# Patient Record
Sex: MALE | Race: WHITE | Employment: OTHER | URBAN - METROPOLITAN AREA
[De-identification: names, ages, dates, MRNs, and addresses within clinical notes are randomized per-mention and may not be internally consistent; named-entity substitution may affect disease eponyms.]

---

## 2018-12-09 ENCOUNTER — APPOINTMENT (OUTPATIENT)
Dept: GENERAL RADIOLOGY | Age: 83
DRG: 872 | End: 2018-12-09
Payer: COMMERCIAL

## 2018-12-09 ENCOUNTER — HOSPITAL ENCOUNTER (INPATIENT)
Age: 83
LOS: 2 days | Discharge: HOME HEALTH | DRG: 872 | End: 2018-12-11
Attending: EMERGENCY MEDICINE | Admitting: INTERNAL MEDICINE
Payer: COMMERCIAL

## 2018-12-09 DIAGNOSIS — N39.0 URINARY TRACT INFECTION WITHOUT HEMATURIA, SITE UNSPECIFIED: ICD-10-CM

## 2018-12-09 DIAGNOSIS — K92.2 GASTROINTESTINAL HEMORRHAGE, UNSPECIFIED GASTROINTESTINAL HEMORRHAGE TYPE: Primary | ICD-10-CM

## 2018-12-09 PROBLEM — Z79.01 ANTICOAGULATED ON COUMADIN: Status: ACTIVE | Noted: 2018-12-09

## 2018-12-09 PROBLEM — I48.20 ATRIAL FIBRILLATION, CHRONIC (HCC): Status: ACTIVE | Noted: 2018-12-09

## 2018-12-09 PROBLEM — I10 ESSENTIAL HYPERTENSION: Status: ACTIVE | Noted: 2018-12-09

## 2018-12-09 LAB
-: ABNORMAL
ABSOLUTE EOS #: 0 K/UL (ref 0–0.4)
ABSOLUTE IMMATURE GRANULOCYTE: ABNORMAL K/UL (ref 0–0.3)
ABSOLUTE LYMPH #: 0.2 K/UL (ref 1–4.8)
ABSOLUTE MONO #: 0.26 K/UL (ref 0.2–0.8)
AMORPHOUS: ABNORMAL
ANION GAP SERPL CALCULATED.3IONS-SCNC: 14 MMOL/L (ref 9–17)
BACTERIA: ABNORMAL
BASOPHILS # BLD: 0 %
BASOPHILS ABSOLUTE: 0 K/UL (ref 0–0.2)
BILIRUBIN URINE: NEGATIVE
BUN BLDV-MCNC: 39 MG/DL (ref 8–23)
BUN/CREAT BLD: 19 (ref 9–20)
CALCIUM SERPL-MCNC: 8.5 MG/DL (ref 8.6–10.4)
CASTS UA: ABNORMAL /LPF
CHLORIDE BLD-SCNC: 106 MMOL/L (ref 98–107)
CO2: 22 MMOL/L (ref 20–31)
COLOR: YELLOW
COMMENT UA: ABNORMAL
CREAT SERPL-MCNC: 2.01 MG/DL (ref 0.7–1.2)
CRYSTALS, UA: ABNORMAL /HPF
DATE, STOOL #1: ABNORMAL
DATE, STOOL #2: ABNORMAL
DATE, STOOL #3: ABNORMAL
DIFFERENTIAL TYPE: ABNORMAL
DIRECT EXAM: NORMAL
EKG ATRIAL RATE: 66 BPM
EKG Q-T INTERVAL: 364 MS
EKG QRS DURATION: 100 MS
EKG QTC CALCULATION (BAZETT): 445 MS
EKG R AXIS: -5 DEGREES
EKG T AXIS: 148 DEGREES
EKG VENTRICULAR RATE: 90 BPM
EOSINOPHILS RELATIVE PERCENT: 0 % (ref 1–4)
EPITHELIAL CELLS UA: ABNORMAL /HPF (ref 0–5)
GFR AFRICAN AMERICAN: 38 ML/MIN
GFR NON-AFRICAN AMERICAN: 32 ML/MIN
GFR SERPL CREATININE-BSD FRML MDRD: ABNORMAL ML/MIN/{1.73_M2}
GFR SERPL CREATININE-BSD FRML MDRD: ABNORMAL ML/MIN/{1.73_M2}
GLUCOSE BLD-MCNC: 169 MG/DL (ref 70–99)
GLUCOSE URINE: NEGATIVE
HCT VFR BLD CALC: 26.5 % (ref 41–53)
HEMOCCULT SP1 STL QL: POSITIVE
HEMOCCULT SP2 STL QL: ABNORMAL
HEMOCCULT SP3 STL QL: ABNORMAL
HEMOGLOBIN: 9.1 G/DL (ref 13.5–17.5)
IMMATURE GRANULOCYTES: ABNORMAL %
KETONES, URINE: NEGATIVE
LACTIC ACID: 1.9 MMOL/L (ref 0.5–2.2)
LEUKOCYTE ESTERASE, URINE: ABNORMAL
LYMPHOCYTES # BLD: 3 % (ref 24–44)
Lab: NORMAL
MCH RBC QN AUTO: 31.6 PG (ref 26–34)
MCHC RBC AUTO-ENTMCNC: 34.5 G/DL (ref 31–37)
MCV RBC AUTO: 91.6 FL (ref 80–100)
MONOCYTES # BLD: 4 % (ref 1–7)
MUCUS: ABNORMAL
MYOGLOBIN: 1377 NG/ML (ref 28–72)
MYOGLOBIN: 1545 NG/ML (ref 28–72)
NITRITE, URINE: NEGATIVE
NRBC AUTOMATED: ABNORMAL PER 100 WBC
OTHER OBSERVATIONS UA: ABNORMAL
PDW BLD-RTO: 14.7 % (ref 11.5–14.5)
PH UA: 5.5 (ref 5–8)
PLATELET # BLD: 104 K/UL (ref 130–400)
PLATELET ESTIMATE: ABNORMAL
PMV BLD AUTO: 9.4 FL (ref 6–12)
POTASSIUM SERPL-SCNC: 3.1 MMOL/L (ref 3.7–5.3)
PROCALCITONIN: 4.03 NG/ML
PROTEIN UA: ABNORMAL
RBC # BLD: 2.9 M/UL (ref 4.5–5.9)
RBC # BLD: ABNORMAL 10*6/UL
RBC UA: ABNORMAL /HPF (ref 0–2)
RENAL EPITHELIAL, UA: ABNORMAL /HPF
SEG NEUTROPHILS: 93 % (ref 36–66)
SEGMENTED NEUTROPHILS ABSOLUTE COUNT: 6.14 K/UL (ref 1.8–7.7)
SODIUM BLD-SCNC: 142 MMOL/L (ref 135–144)
SPECIFIC GRAVITY UA: 1.01 (ref 1–1.03)
SPECIMEN DESCRIPTION: NORMAL
STATUS: NORMAL
TIME, STOOL #1: 1820
TIME, STOOL #2: ABNORMAL
TIME, STOOL #3: ABNORMAL
TOTAL CK: 238 U/L (ref 39–308)
TRICHOMONAS: ABNORMAL
TROPONIN INTERP: ABNORMAL
TROPONIN INTERP: ABNORMAL
TROPONIN T: 0.05 NG/ML
TROPONIN T: 0.06 NG/ML
TURBIDITY: ABNORMAL
URINE HGB: ABNORMAL
UROBILINOGEN, URINE: NORMAL
WBC # BLD: 6.6 K/UL (ref 3.5–11)
WBC # BLD: ABNORMAL 10*3/UL
WBC UA: ABNORMAL /HPF (ref 0–5)
YEAST: ABNORMAL

## 2018-12-09 PROCEDURE — 6370000000 HC RX 637 (ALT 250 FOR IP): Performed by: NURSE PRACTITIONER

## 2018-12-09 PROCEDURE — 2580000003 HC RX 258: Performed by: NURSE PRACTITIONER

## 2018-12-09 PROCEDURE — 6360000002 HC RX W HCPCS: Performed by: NURSE PRACTITIONER

## 2018-12-09 PROCEDURE — 2580000003 HC RX 258: Performed by: INTERNAL MEDICINE

## 2018-12-09 PROCEDURE — G0328 FECAL BLOOD SCRN IMMUNOASSAY: HCPCS

## 2018-12-09 PROCEDURE — 2700000000 HC OXYGEN THERAPY PER DAY

## 2018-12-09 PROCEDURE — 36415 COLL VENOUS BLD VENIPUNCTURE: CPT

## 2018-12-09 PROCEDURE — 84484 ASSAY OF TROPONIN QUANT: CPT

## 2018-12-09 PROCEDURE — 87086 URINE CULTURE/COLONY COUNT: CPT

## 2018-12-09 PROCEDURE — 87186 SC STD MICRODIL/AGAR DIL: CPT

## 2018-12-09 PROCEDURE — 99285 EMERGENCY DEPT VISIT HI MDM: CPT

## 2018-12-09 PROCEDURE — 6360000002 HC RX W HCPCS: Performed by: INTERNAL MEDICINE

## 2018-12-09 PROCEDURE — 96365 THER/PROPH/DIAG IV INF INIT: CPT

## 2018-12-09 PROCEDURE — 84145 PROCALCITONIN (PCT): CPT

## 2018-12-09 PROCEDURE — 87040 BLOOD CULTURE FOR BACTERIA: CPT

## 2018-12-09 PROCEDURE — C9113 INJ PANTOPRAZOLE SODIUM, VIA: HCPCS | Performed by: INTERNAL MEDICINE

## 2018-12-09 PROCEDURE — 93005 ELECTROCARDIOGRAM TRACING: CPT

## 2018-12-09 PROCEDURE — 87149 DNA/RNA DIRECT PROBE: CPT

## 2018-12-09 PROCEDURE — 2060000000 HC ICU INTERMEDIATE R&B

## 2018-12-09 PROCEDURE — 81001 URINALYSIS AUTO W/SCOPE: CPT

## 2018-12-09 PROCEDURE — 71045 X-RAY EXAM CHEST 1 VIEW: CPT

## 2018-12-09 PROCEDURE — 83874 ASSAY OF MYOGLOBIN: CPT

## 2018-12-09 PROCEDURE — 80048 BASIC METABOLIC PNL TOTAL CA: CPT

## 2018-12-09 PROCEDURE — 99222 1ST HOSP IP/OBS MODERATE 55: CPT | Performed by: INTERNAL MEDICINE

## 2018-12-09 PROCEDURE — 85025 COMPLETE CBC W/AUTO DIFF WBC: CPT

## 2018-12-09 PROCEDURE — 87205 SMEAR GRAM STAIN: CPT

## 2018-12-09 PROCEDURE — 6370000000 HC RX 637 (ALT 250 FOR IP): Performed by: INTERNAL MEDICINE

## 2018-12-09 PROCEDURE — 83605 ASSAY OF LACTIC ACID: CPT

## 2018-12-09 PROCEDURE — 82550 ASSAY OF CK (CPK): CPT

## 2018-12-09 PROCEDURE — 87804 INFLUENZA ASSAY W/OPTIC: CPT

## 2018-12-09 PROCEDURE — 87088 URINE BACTERIA CULTURE: CPT

## 2018-12-09 RX ORDER — HYDROCODONE BITARTRATE AND ACETAMINOPHEN 5; 325 MG/1; MG/1
2 TABLET ORAL EVERY 4 HOURS PRN
Status: DISCONTINUED | OUTPATIENT
Start: 2018-12-09 | End: 2018-12-11 | Stop reason: HOSPADM

## 2018-12-09 RX ORDER — POTASSIUM CHLORIDE 20 MEQ/1
40 TABLET, EXTENDED RELEASE ORAL ONCE
Status: COMPLETED | OUTPATIENT
Start: 2018-12-09 | End: 2018-12-09

## 2018-12-09 RX ORDER — SODIUM CHLORIDE 9 MG/ML
INJECTION, SOLUTION INTRAVENOUS CONTINUOUS
Status: DISCONTINUED | OUTPATIENT
Start: 2018-12-09 | End: 2018-12-10

## 2018-12-09 RX ORDER — MORPHINE SULFATE 2 MG/ML
2 INJECTION, SOLUTION INTRAMUSCULAR; INTRAVENOUS
Status: DISCONTINUED | OUTPATIENT
Start: 2018-12-09 | End: 2018-12-11 | Stop reason: HOSPADM

## 2018-12-09 RX ORDER — MORPHINE SULFATE 4 MG/ML
4 INJECTION, SOLUTION INTRAMUSCULAR; INTRAVENOUS
Status: DISCONTINUED | OUTPATIENT
Start: 2018-12-09 | End: 2018-12-11 | Stop reason: HOSPADM

## 2018-12-09 RX ORDER — SODIUM CHLORIDE 0.9 % (FLUSH) 0.9 %
10 SYRINGE (ML) INJECTION EVERY 12 HOURS SCHEDULED
Status: DISCONTINUED | OUTPATIENT
Start: 2018-12-09 | End: 2018-12-11 | Stop reason: HOSPADM

## 2018-12-09 RX ORDER — PRAVASTATIN SODIUM 40 MG
40 TABLET ORAL DAILY
COMMUNITY

## 2018-12-09 RX ORDER — ONDANSETRON 2 MG/ML
4 INJECTION INTRAMUSCULAR; INTRAVENOUS EVERY 6 HOURS PRN
Status: DISCONTINUED | OUTPATIENT
Start: 2018-12-09 | End: 2018-12-11 | Stop reason: HOSPADM

## 2018-12-09 RX ORDER — POTASSIUM CHLORIDE 20 MEQ/1
40 TABLET, EXTENDED RELEASE ORAL PRN
Status: DISCONTINUED | OUTPATIENT
Start: 2018-12-09 | End: 2018-12-09

## 2018-12-09 RX ORDER — 0.9 % SODIUM CHLORIDE 0.9 %
1000 INTRAVENOUS SOLUTION INTRAVENOUS ONCE
Status: COMPLETED | OUTPATIENT
Start: 2018-12-09 | End: 2018-12-09

## 2018-12-09 RX ORDER — POTASSIUM CHLORIDE 7.45 MG/ML
10 INJECTION INTRAVENOUS PRN
Status: DISCONTINUED | OUTPATIENT
Start: 2018-12-09 | End: 2018-12-09

## 2018-12-09 RX ORDER — FUROSEMIDE 40 MG/1
40 TABLET ORAL DAILY
COMMUNITY

## 2018-12-09 RX ORDER — HYDROCODONE BITARTRATE AND ACETAMINOPHEN 5; 325 MG/1; MG/1
1 TABLET ORAL EVERY 4 HOURS PRN
Status: DISCONTINUED | OUTPATIENT
Start: 2018-12-09 | End: 2018-12-11 | Stop reason: HOSPADM

## 2018-12-09 RX ORDER — ACETAMINOPHEN 500 MG
1000 TABLET ORAL ONCE
Status: COMPLETED | OUTPATIENT
Start: 2018-12-09 | End: 2018-12-09

## 2018-12-09 RX ORDER — ACETAMINOPHEN 325 MG/1
650 TABLET ORAL EVERY 4 HOURS PRN
Status: DISCONTINUED | OUTPATIENT
Start: 2018-12-09 | End: 2018-12-11 | Stop reason: HOSPADM

## 2018-12-09 RX ORDER — ONDANSETRON 2 MG/ML
4 INJECTION INTRAMUSCULAR; INTRAVENOUS EVERY 6 HOURS PRN
Status: DISCONTINUED | OUTPATIENT
Start: 2018-12-09 | End: 2018-12-09 | Stop reason: SDUPTHER

## 2018-12-09 RX ORDER — CILOSTAZOL 100 MG/1
100 TABLET ORAL DAILY
COMMUNITY

## 2018-12-09 RX ORDER — CARVEDILOL 6.25 MG/1
6.25 TABLET ORAL 2 TIMES DAILY
Status: DISCONTINUED | OUTPATIENT
Start: 2018-12-09 | End: 2018-12-11 | Stop reason: HOSPADM

## 2018-12-09 RX ORDER — SODIUM CHLORIDE 0.9 % (FLUSH) 0.9 %
10 SYRINGE (ML) INJECTION PRN
Status: DISCONTINUED | OUTPATIENT
Start: 2018-12-09 | End: 2018-12-11 | Stop reason: HOSPADM

## 2018-12-09 RX ORDER — ONDANSETRON 4 MG/1
4 TABLET, ORALLY DISINTEGRATING ORAL EVERY 6 HOURS PRN
Status: DISCONTINUED | OUTPATIENT
Start: 2018-12-09 | End: 2018-12-11 | Stop reason: HOSPADM

## 2018-12-09 RX ORDER — POTASSIUM CHLORIDE 20MEQ/15ML
40 LIQUID (ML) ORAL PRN
Status: DISCONTINUED | OUTPATIENT
Start: 2018-12-09 | End: 2018-12-09

## 2018-12-09 RX ADMIN — SODIUM CHLORIDE: 9 INJECTION, SOLUTION INTRAVENOUS at 22:30

## 2018-12-09 RX ADMIN — POTASSIUM CHLORIDE 40 MEQ: 20 TABLET, EXTENDED RELEASE ORAL at 22:30

## 2018-12-09 RX ADMIN — SODIUM CHLORIDE 8 MG/HR: 9 INJECTION, SOLUTION INTRAVENOUS at 22:30

## 2018-12-09 RX ADMIN — SODIUM CHLORIDE 1000 ML: 9 INJECTION, SOLUTION INTRAVENOUS at 15:58

## 2018-12-09 RX ADMIN — CEFTRIAXONE SODIUM 1 G: 1 INJECTION, POWDER, FOR SOLUTION INTRAMUSCULAR; INTRAVENOUS at 18:28

## 2018-12-09 RX ADMIN — Medication 10 ML: at 22:30

## 2018-12-09 RX ADMIN — CARVEDILOL 6.25 MG: 6.25 TABLET, FILM COATED ORAL at 22:30

## 2018-12-09 RX ADMIN — ACETAMINOPHEN 1000 MG: 500 TABLET ORAL at 16:41

## 2018-12-09 ASSESSMENT — ENCOUNTER SYMPTOMS
SORE THROAT: 0
SHORTNESS OF BREATH: 0
CONSTIPATION: 0
COUGH: 0
COLOR CHANGE: 0
WHEEZING: 0
NAUSEA: 0
SINUS PRESSURE: 0
VOMITING: 0
DIARRHEA: 0
ABDOMINAL PAIN: 0
RHINORRHEA: 0

## 2018-12-09 ASSESSMENT — PAIN SCALES - GENERAL
PAINLEVEL_OUTOF10: 10
PAINLEVEL_OUTOF10: 5

## 2018-12-09 NOTE — ED NOTES
Into do rectal exam with [facility] MICKY.         Anupam Rosa, RN  12/09/18 1482

## 2018-12-10 PROBLEM — B96.20 E COLI BACTEREMIA: Status: ACTIVE | Noted: 2018-12-10

## 2018-12-10 PROBLEM — R78.81 E COLI BACTEREMIA: Status: ACTIVE | Noted: 2018-12-10

## 2018-12-10 LAB
ANION GAP SERPL CALCULATED.3IONS-SCNC: 11 MMOL/L (ref 9–17)
BUN BLDV-MCNC: 42 MG/DL (ref 8–23)
BUN/CREAT BLD: 18 (ref 9–20)
CALCIUM SERPL-MCNC: 8 MG/DL (ref 8.6–10.4)
CHLORIDE BLD-SCNC: 107 MMOL/L (ref 98–107)
CO2: 23 MMOL/L (ref 20–31)
CREAT SERPL-MCNC: 2.33 MG/DL (ref 0.7–1.2)
FERRITIN: 482 UG/L (ref 30–400)
FOLATE: 18.5 NG/ML
GFR AFRICAN AMERICAN: 32 ML/MIN
GFR NON-AFRICAN AMERICAN: 27 ML/MIN
GFR SERPL CREATININE-BSD FRML MDRD: ABNORMAL ML/MIN/{1.73_M2}
GFR SERPL CREATININE-BSD FRML MDRD: ABNORMAL ML/MIN/{1.73_M2}
GLUCOSE BLD-MCNC: 128 MG/DL (ref 75–110)
GLUCOSE BLD-MCNC: 130 MG/DL (ref 75–110)
GLUCOSE BLD-MCNC: 190 MG/DL (ref 70–99)
GLUCOSE BLD-MCNC: 256 MG/DL (ref 75–110)
HCT VFR BLD CALC: 27.7 % (ref 41–53)
HEMOGLOBIN: 10.4 G/DL (ref 13.5–17.5)
HEMOGLOBIN: 9.5 G/DL (ref 13.5–17.5)
HEMOGLOBIN: 9.6 G/DL (ref 13.5–17.5)
HEMOGLOBIN: 9.8 G/DL (ref 13.5–17.5)
INR BLD: 5.6
IRON SATURATION: 8 % (ref 20–55)
IRON: 18 UG/DL (ref 59–158)
MCH RBC QN AUTO: 32.1 PG (ref 26–34)
MCHC RBC AUTO-ENTMCNC: 34.6 G/DL (ref 31–37)
MCV RBC AUTO: 92.6 FL (ref 80–100)
MYOGLOBIN: 1028 NG/ML (ref 28–72)
MYOGLOBIN: 715 NG/ML (ref 28–72)
NRBC AUTOMATED: ABNORMAL PER 100 WBC
PDW BLD-RTO: 14.5 % (ref 11.5–14.5)
PLATELET # BLD: 55 K/UL (ref 130–400)
PMV BLD AUTO: 8.6 FL (ref 6–12)
POTASSIUM SERPL-SCNC: 3.9 MMOL/L (ref 3.7–5.3)
PROTHROMBIN TIME: 53.1 SEC (ref 9.7–11.6)
RBC # BLD: 2.99 M/UL (ref 4.5–5.9)
SODIUM BLD-SCNC: 141 MMOL/L (ref 135–144)
TOTAL IRON BINDING CAPACITY: 215 UG/DL (ref 250–450)
TROPONIN INTERP: ABNORMAL
TROPONIN INTERP: ABNORMAL
TROPONIN T: 0.03 NG/ML
TROPONIN T: <0.03 NG/ML
UNSATURATED IRON BINDING CAPACITY: 197 UG/DL (ref 112–347)
WBC # BLD: 6.4 K/UL (ref 3.5–11)

## 2018-12-10 PROCEDURE — 99232 SBSQ HOSP IP/OBS MODERATE 35: CPT | Performed by: INTERNAL MEDICINE

## 2018-12-10 PROCEDURE — 6370000000 HC RX 637 (ALT 250 FOR IP): Performed by: NURSE PRACTITIONER

## 2018-12-10 PROCEDURE — 83540 ASSAY OF IRON: CPT

## 2018-12-10 PROCEDURE — 2580000003 HC RX 258: Performed by: NURSE PRACTITIONER

## 2018-12-10 PROCEDURE — 84425 ASSAY OF VITAMIN B-1: CPT

## 2018-12-10 PROCEDURE — 99222 1ST HOSP IP/OBS MODERATE 55: CPT | Performed by: INTERNAL MEDICINE

## 2018-12-10 PROCEDURE — 6360000002 HC RX W HCPCS: Performed by: INTERNAL MEDICINE

## 2018-12-10 PROCEDURE — 2060000000 HC ICU INTERMEDIATE R&B

## 2018-12-10 PROCEDURE — 85027 COMPLETE CBC AUTOMATED: CPT

## 2018-12-10 PROCEDURE — 84484 ASSAY OF TROPONIN QUANT: CPT

## 2018-12-10 PROCEDURE — 82947 ASSAY GLUCOSE BLOOD QUANT: CPT

## 2018-12-10 PROCEDURE — 82746 ASSAY OF FOLIC ACID SERUM: CPT

## 2018-12-10 PROCEDURE — 97162 PT EVAL MOD COMPLEX 30 MIN: CPT

## 2018-12-10 PROCEDURE — 80048 BASIC METABOLIC PNL TOTAL CA: CPT

## 2018-12-10 PROCEDURE — 82728 ASSAY OF FERRITIN: CPT

## 2018-12-10 PROCEDURE — 83550 IRON BINDING TEST: CPT

## 2018-12-10 PROCEDURE — 36415 COLL VENOUS BLD VENIPUNCTURE: CPT

## 2018-12-10 PROCEDURE — 83874 ASSAY OF MYOGLOBIN: CPT

## 2018-12-10 PROCEDURE — 85610 PROTHROMBIN TIME: CPT

## 2018-12-10 PROCEDURE — 2580000003 HC RX 258: Performed by: INTERNAL MEDICINE

## 2018-12-10 PROCEDURE — G8979 MOBILITY GOAL STATUS: HCPCS

## 2018-12-10 PROCEDURE — 6370000000 HC RX 637 (ALT 250 FOR IP): Performed by: INTERNAL MEDICINE

## 2018-12-10 PROCEDURE — C9113 INJ PANTOPRAZOLE SODIUM, VIA: HCPCS | Performed by: INTERNAL MEDICINE

## 2018-12-10 PROCEDURE — 97116 GAIT TRAINING THERAPY: CPT

## 2018-12-10 PROCEDURE — G8978 MOBILITY CURRENT STATUS: HCPCS

## 2018-12-10 PROCEDURE — 97530 THERAPEUTIC ACTIVITIES: CPT

## 2018-12-10 PROCEDURE — 85018 HEMOGLOBIN: CPT

## 2018-12-10 RX ORDER — NICOTINE POLACRILEX 4 MG
15 LOZENGE BUCCAL PRN
Status: DISCONTINUED | OUTPATIENT
Start: 2018-12-10 | End: 2018-12-11 | Stop reason: HOSPADM

## 2018-12-10 RX ORDER — PHYTONADIONE 5 MG/1
5 TABLET ORAL ONCE
Status: COMPLETED | OUTPATIENT
Start: 2018-12-10 | End: 2018-12-10

## 2018-12-10 RX ORDER — PANTOPRAZOLE SODIUM 40 MG/1
40 TABLET, DELAYED RELEASE ORAL
Status: DISCONTINUED | OUTPATIENT
Start: 2018-12-10 | End: 2018-12-11 | Stop reason: HOSPADM

## 2018-12-10 RX ORDER — DEXTROSE MONOHYDRATE 50 MG/ML
100 INJECTION, SOLUTION INTRAVENOUS PRN
Status: DISCONTINUED | OUTPATIENT
Start: 2018-12-10 | End: 2018-12-11 | Stop reason: HOSPADM

## 2018-12-10 RX ORDER — DEXTROSE MONOHYDRATE 25 G/50ML
12.5 INJECTION, SOLUTION INTRAVENOUS PRN
Status: DISCONTINUED | OUTPATIENT
Start: 2018-12-10 | End: 2018-12-11 | Stop reason: HOSPADM

## 2018-12-10 RX ADMIN — SODIUM CHLORIDE 8 MG/HR: 9 INJECTION, SOLUTION INTRAVENOUS at 07:44

## 2018-12-10 RX ADMIN — CARVEDILOL 6.25 MG: 6.25 TABLET, FILM COATED ORAL at 09:58

## 2018-12-10 RX ADMIN — INSULIN LISPRO 3 UNITS: 100 INJECTION, SOLUTION INTRAVENOUS; SUBCUTANEOUS at 22:07

## 2018-12-10 RX ADMIN — SODIUM CHLORIDE: 9 INJECTION, SOLUTION INTRAVENOUS at 07:44

## 2018-12-10 RX ADMIN — PHYTONADIONE 5 MG: 5 TABLET ORAL at 04:29

## 2018-12-10 RX ADMIN — CARVEDILOL 6.25 MG: 6.25 TABLET, FILM COATED ORAL at 20:41

## 2018-12-10 RX ADMIN — Medication 10 ML: at 20:41

## 2018-12-10 RX ADMIN — CEFTRIAXONE SODIUM 1 G: 1 INJECTION, POWDER, FOR SOLUTION INTRAMUSCULAR; INTRAVENOUS at 17:46

## 2018-12-10 RX ADMIN — PANTOPRAZOLE SODIUM 40 MG: 40 TABLET, DELAYED RELEASE ORAL at 19:14

## 2018-12-10 ASSESSMENT — PAIN SCALES - GENERAL: PAINLEVEL_OUTOF10: 0

## 2018-12-10 NOTE — ED PROVIDER NOTES
more for level 5)     ED Triage Vitals [12/09/18 1539]   BP Temp Temp Source Pulse Resp SpO2 Height Weight   (!) 113/39 98.6 °F (37 °C) Oral 84 24 (!) 89 % 5' 11\" (1.803 m) 199 lb (90.3 kg)       Physical Exam   Constitutional: He is oriented to person, place, and time. He appears well-developed and well-nourished. HENT:   Head: Normocephalic and atraumatic. Mouth/Throat: Oropharynx is clear and moist.   Eyes: Pupils are equal, round, and reactive to light. Conjunctivae are normal.   Neck: Normal range of motion. Neck supple. Cardiovascular: Normal rate and regular rhythm. Pulmonary/Chest: Effort normal and breath sounds normal. No stridor. No respiratory distress. Abdominal: Soft. Bowel sounds are normal.   Musculoskeletal: Normal range of motion. Lymphadenopathy:     He has no cervical adenopathy. Neurological: He is alert and oriented to person, place, and time. Skin: Skin is warm and dry. No rash noted. Psychiatric: He has a normal mood and affect. Vitals reviewed. DIAGNOSTIC RESULTS         RADIOLOGY:   Non-plain film images such as CT, Ultrasound and MRI are read by the radiologist. Alcario Risk radiographic images are visualized and preliminarily interpreted by the emergency physician with the below findings:    Xr Chest Portable    Result Date: 12/9/2018  EXAMINATION: SINGLE XRAY VIEW OF THE CHEST 12/9/2018 3:44 pm COMPARISON: None. HISTORY: ORDERING SYSTEM PROVIDED HISTORY: Chest Pain TECHNOLOGIST PROVIDED HISTORY: Chest Pain Ordering Physician Provided Reason for Exam: chest pain  cough Acuity: Unknown Type of Exam: Unknown FINDINGS: There is moderate cardiomegaly. No infiltrate or consolidation or effusion is identified. Vascular calcifications are present and there are degenerative changes of the spine. Cardiomegaly. Interpretation per the Radiologist below, if available at the time of this note:    XR CHEST PORTABLE   Final Result   Cardiomegaly. LABS:  Labs Reviewed   CBC WITH AUTO DIFFERENTIAL - Abnormal; Notable for the following:        Result Value    RBC 2.90 (*)     Hemoglobin 9.1 (*)     Hematocrit 26.5 (*)     RDW 14.7 (*)     Platelets 738 (*)     Seg Neutrophils 93 (*)     Lymphocytes 3 (*)     Eosinophils % 0 (*)     Absolute Lymph # 0.20 (*)     All other components within normal limits   BASIC METABOLIC PANEL - Abnormal; Notable for the following:     Glucose 169 (*)     BUN 39 (*)     CREATININE 2.01 (*)     Calcium 8.5 (*)     Potassium 3.1 (*)     GFR Non- 32 (*)     GFR  38 (*)     All other components within normal limits   TROP/MYOGLOBIN - Abnormal; Notable for the following:     Troponin T 0.06 (*)     Myoglobin 1,377 (*)     All other components within normal limits   URINE RT REFLEX TO CULTURE - Abnormal; Notable for the following:     Turbidity UA CLOUDY (*)     Urine Hgb 3+ (*)     Protein, UA TRACE (*)     Leukocyte Esterase, Urine SMALL (*)     All other components within normal limits   PROCALCITONIN - Abnormal; Notable for the following:     Procalcitonin 4.03 (*)     All other components within normal limits   MICROSCOPIC URINALYSIS - Abnormal; Notable for the following:     Bacteria, UA MANY (*)     Mucus, UA 1+ (*)     All other components within normal limits   OCCULT BLOOD SCREEN - Abnormal; Notable for the following:     Occult Blood, Stool #1 POSITIVE (*)     All other components within normal limits   RAPID INFLUENZA A/B ANTIGENS   CULTURE BLOOD #1   CULTURE BLOOD #1   URINE CULTURE CLEAN CATCH   LACTIC ACID   CK       All other labs were within normal range or not returned as of this dictation.     EMERGENCY DEPARTMENT COURSE and DIFFERENTIAL DIAGNOSIS/MDM:   Vitals:    Vitals:    12/09/18 1804 12/09/18 1815 12/09/18 1830 12/09/18 1836   BP:  (!) 100/42 (!) 101/38    Pulse:  80 86 78   Resp:  25 25 26   Temp: 102.7 °F (39.3 °C)      TempSrc: Oral      SpO2:    95%   Weight: Height:           Medical Decision Making: Cultures ×2 were drawn. His lactic acid is normal.  Found to have a urinary tract infection. He'll be admitted for IV antibiotics. CONSULTS:  IP CONSULT TO INTERNAL MEDICINE  IP CONSULT TO GI  IP CONSULT TO SOCIAL WORK        FINAL IMPRESSION      1. Gastrointestinal hemorrhage, unspecified gastrointestinal hemorrhage type    2.  Urinary tract infection without hematuria, site unspecified          DISPOSITION/PLAN   DISPOSITION Admitted 12/09/2018 06:45:36 PM      PATIENT REFERRED TO:   Toshia Dixon [phone]            DISCHARGE MEDICATIONS:     New Prescriptions    No medications on file           (Please note that portions of this note were completed with a voice recognition program.  Efforts were made to edit the dictations but occasionally words are mis-transcribed.)    [address] [facility] NP, APRN - [id number]  Certified Nurse Practitioner            DANA Thomas CNP  12/09/18 [facility], APRN - CNP  12/09/18 2020

## 2018-12-10 NOTE — CONSULTS
morphine **OR** morphine, ondansetron **OR** ondansetron    SOCIAL HISTORY:     Tobacco:   reports that he has never smoked. He has never used smokeless tobacco.  Alcohol:   reports that he drinks about 4.2 oz of alcohol per week . Illicit drugs:  reports that he does not use drugs. FAMILY HISTORY:     History reviewed. No pertinent family history. REVIEW OF SYSTEMS:    Constitutional: No fever, no chills, no lethargy, no weakness. HEENT:  No headache, otalgia, itchy eyes, nasal discharge or sore throat. Cardiac:  No chest pain, dyspnea, orthopnea or PND. Chest:   No cough, phlegm or wheezing. Abdomen:  See gi hpi  Neuro:  No focal weakness, abnormal movements or seizure like activity. Skin:   No rashes, no itching. :   No hematuria, no pyuria, no dysuria, no flank pain. Extremities:  No swelling or joint pains. ROS was otherwise negative except as mentioned in the [address] PHYSICAL EXAM:    /74   Pulse 82   Temp 97.3 °F (36.3 °C) (Oral)   Resp 16   Ht 5' 11\" (1.803 m)   Wt 199 lb (90.3 kg)   SpO2 98%   BMI 27.75 kg/m²     GENERAL:   Well developed, Well nourished, No apparent distress  HEAD:   Normocephalic, Atraumatic  EENT:   EOMI, Sclera not icteric, Oropharynx moist  NECK:   Supple, Trachea midline  LUNGS:  CTA Bilaterally  HEART:  RRR, No murmur  ABDOMEN:   Soft, Nontender, Nondistended, BS WNL  EXT:   No clubbing. No cyanosis. No edema. SKIN:   No rashes. No jaundice. No stigmata of liver disease.     MUSC/SKEL:   Adequate muscle bulk for patient's age, No significant synovitis, No deformities  NEURO:  A&O x Three, CN II- XII grossly intact    LABS AND IMAGING:    CBC  Recent Labs      12/09/18   1605  12/10/18   0027  12/10/18   0311   WBC  6.6   --   6.4   HGB  9.1*  9.5*  9.6*   HCT  26.5*   --   27.7*   MCV  91.6   --   92.6   PLT  104*   --   55*       BMP  Recent Labs      12/09/18   1605  12/10/18   0311   NA  142  141   K  3.1*  3.9   CL  106  107   CO2  22  23   BUN Adelaida Turner MD

## 2018-12-10 NOTE — H&P
[facility]    HISTORY AND PHYSICAL EXAMINATION            Date:   12/9/2018  Patient name:  Horacio Chapman  Date of admission:  12/9/2018  3:39 PM  MRN:   [id number]  Account:  [de-identified]  YOB: 1930  PCP:    Joaquín Ahumada MD  Room:   [place] [address]  Code Status:    No Order    Chief Complaint:     Chief Complaint   Patient presents with    Fatigue     X 3 days       History Obtained From:     patient, electronic medical record    History of Present Illness: The patient is a 80 y.o. male with PMH significant for a fib on coumadin, HTN, CHF, CKD who presented with fatigue. Patient states symptoms were going on for the past 3 days. State he was also having intermittent diarrhea and nausea/vomiting but states symptoms resolved. Denies any fever/chills, chest/abdominal pain. States he was feeling weak and dehydrated. In ER labs demonstrating Hgb of 9.1, FOBT positive. Creatinine elevated at 2.01 similar to previous consistent with history CKD. UA demonstrating UTI. Currently on nasal canula with no home oxygen use at home per patient.        Past Medical History:     Past Medical History:   Diagnosis Date    Achilles tendon pain 1980's    Repair, Had DVT post surgery    Anemia     Atrial fibrillation ([address]     Cardiomyopathy ([address]     Cardiomyopathy ([address]     Cataract     [facility]est[facility]     Chronic kidney disease     Stage 3    COPD (chronic obstructive pulmonary disease) ([facility])     Dental disease     Diabetes mellitus ([address]     Type 2     Diabetic neuropathy ([facility])     Bilateral Lower legs distal to knees    DVT (deep venous thrombosis) ([facility]) 1980's    With PE     Edema     Bilateral Legs    Entropion 06/2016    Right Lower Lid    Gout     Hearing loss     Hyperlipidemia     Hypertension     Ribs, multiple fractures     Per [address] records        Past Surgical History:     Past Surgical History: palpitations. GASTROINTESTINAL:  negative for nausea, vomiting, change in bowel habits, abdominal pain   INTEGUMENT:  negative for rash, skin lesions, easy bruising   HEMATOLOGIC/LYMPHATIC:  negative for swelling/edema  ALLERGIC/IMMUNOLOGIC:  negative for urticaria , itching  ENDOCRINE:  negative increase in drinking, increase in urination, hot or cold intolerance  MUSCULOSKELETAL:  negative joint pains, muscle aches, swelling of joints  NEUROLOGICAL:  negative for headaches, dizziness, lightheadedness, numbness, pain, tingling extremities  BEHAVIOR/PSYCH:  negative for depression, anxiety    Physical Exam:   BP (!) 101/38   Pulse 78   Temp 102.7 °F (39.3 °C) (Oral)   Resp 26   Ht 5' 11\" (1.803 m)   Wt 199 lb (90.3 kg)   SpO2 95%   BMI 27.75 kg/m²   Temp (24hrs), Av.7 °F (38.2 °C), Min:98.6 °F (37 °C), Max:102.7 °F (39.3 °C)    No results for input(s): POCGLU in the last 72 hours. No intake or output data in the 24 hours ending 18    General Appearance:  alert, well appearing, and in no acute distress, on NC  Mental status: oriented to person, place, and time with normal affect  Head:  normocephalic, atraumatic.   Eye: no icterus, redness, pupils equal and reactive, extraocular eye movements intact, conjunctiva clear  Ear: normal external ear, no discharge, hearing intact  Nose:  no drainage noted  Mouth: mucous membranes moist  Neck: supple, no carotid bruits, thyroid not palpable  Lungs: Bilateral equal air entry, clear to auscultation, no wheezing  Cardiovascular: normal rate, regular rhythm, no murmur  Abdomen: Soft, nontender, nondistended, normal bowel sounds  Neurologic: There are no new focal motor or sensory deficits, normal muscle tone and bulk, no abnormal sensation, normal speech, cranial nerves II through XII grossly intact  Skin: No gross lesions, rashes, bruising or bleeding on exposed skin area  Extremities:  peripheral pulses palpable, no pedal edema or calf pain with REPORTED NEG    Date, Stool #2 NOT REPORTED     Time, Stool #2 NOT REPORTED     Occult Blood, Stool #3 NOT REPORTED NEG    Date, Stool #3 NOT REPORTED     Time, Stool #3 NOT REPORTED        Imaging/Diagnostics:    Xr Chest Portable    Result Date: 12/9/2018  EXAMINATION: SINGLE XRAY VIEW OF THE CHEST 12/9/2018 3:44 pm COMPARISON: None. HISTORY: ORDERING SYSTEM PROVIDED HISTORY: Chest Pain TECHNOLOGIST PROVIDED HISTORY: Chest Pain Ordering Physician Provided Reason for Exam: chest pain  cough Acuity: Unknown Type of Exam: Unknown FINDINGS: There is moderate cardiomegaly. No infiltrate or consolidation or effusion is identified. Vascular calcifications are present and there are degenerative changes of the spine. Cardiomegaly. Assessment :      Primary Problem  GI bleed    Active Hospital Problems    Diagnosis Date Noted    GI bleed [K92.2] 12/09/2018    UTI (urinary tract infection) [N39.0] 12/09/2018    Atrial fibrillation, chronic (HCC) [I48.2] 12/09/2018    Essential hypertension [I10] 12/09/2018    Anticoagulated on Coumadin [Z51.81, Z79.01] 12/09/2018       Plan:     Patient status Admit as inpatient in the  Progressive Unit/Step down    - GI consult  - Monitor H/H  - PPI  - IV abx for UTI  - Hold coumadin for a fib  - Resume home medications  - PT/OT  - SW for dc planning    Consultations:   IP CONSULT TO INTERNAL MEDICINE  IP CONSULT TO GI  IP CONSULT TO SOCIAL WORK     Patient is admitted as inpatient status because of co-morbidities listed above, severity of signs and symptoms as outlined, requirement for current medical therapies and most importantly because of direct risk to patient if care not provided in a hospital setting.     Connie Vizcarra MD  12/9/2018  7:14 PM    Copy sent to Dr. Dedrick Yoo MD

## 2018-12-10 NOTE — PROGRESS NOTES
Restriction  Other position/activity restrictions: NPO except ice chips, telemetry, BR with BRP, O2, NC/2L/min  Vision/Hearing  Vision: Impaired  Vision Exceptions: Wears glasses at all times  Hearing: Within functional limits     Subjective  General  Chart Reviewed: Yes  Patient assessed for rehabilitation services?: Yes  Additional Pertinent Hx: cardiomyopathy, atrial fibrillation, CHF, CKD3, DVT with PE  Response To Previous Treatment: Not applicable  Follows Commands: Within Functional Limits  General Comment  Comments: RNPatricia PT  Subjective  Subjective: Pt agreeable to PT  Pain Screening  Patient Currently in Pain: No          Orientation  Orientation  Overall Orientation Status: Within Functional Limits  Social/Functional History  Social/Functional History  Lives With: Spouse  Type of Home: House (condo)  Home Layout: One level  Home Access: Stairs to enter with rails  Entrance Stairs - Number of Steps: 2  Entrance Stairs - Rails: Right  Bathroom Shower/Tub: Walk-in shower  Bathroom Toilet: Handicap height  Bathroom Equipment: Grab bars in shower, Built-in shower seat, Grab bars around toilet  Home Equipment: 4 wheeled walker, Cane  ADL Assistance: Independent  Homemaking Assistance: Needs assistance (shares cooking & cleaning lady for heavy clean)  Homemaking Responsibilities: Yes  Ambulation Assistance: Independent (using cane)  Transfer Assistance: Independent  Active : Yes  Mode of Transportation: Car  Cognition   Cognition  Overall Cognitive Status: Exceptions  Arousal/Alertness: Appropriate responses to stimuli  Attention Span: Appears intact  Memory: Appears intact  Safety Judgement: Decreased awareness of need for assistance;Decreased awareness of need for safety  Problem Solving: Able to problem solve independently;Good awareness of errors made  Insights: Fully aware of deficits  Initiation: Does not require cues  Sequencing: Requires cues for some    Objective          AROM RLE (degrees)  RLE AROM: WFL  AROM LLE (degrees)  LLE AROM : WFL  AROM RUE (degrees)  RUE AROM : WFL  AROM LUE (degrees)  LUE AROM : WFL  Strength RLE  Comment: 4/5 hip  Strength LLE  Comment: 4/5 hip  Strength RUE  Strength RUE: WFL  Strength LUE  Strength LUE: WFL  Tone RLE  RLE Tone: Normotonic  Tone LLE  LLE Tone: Normotonic  Coordination  Movements Are Fluid And Coordinated: Yes  Motor Control  Gross Motor?: WFL  Sensation  Overall Sensation Status: WFL  Bed mobility  Rolling to Left: Supervision  Supine to Sit: Contact guard assistance  Sit to Supine: Contact guard assistance  Comment: cues/assist for technique & hand placement to use UB to assist   Pt sat at EOB for 5 minutes to rest after bed mobility & prepare lines for standing & ambulation. Transfers  Sit to Stand: Minimal Assistance  Stand to sit: Contact guard assistance  Bed to Chair: Contact guard assistance  Comment: Ed for correct hand placement for safe sit/stand  Ambulation  Ambulation?: Yes  Ambulation 1  Surface: level tile  Device: Rolling Walker  Assistance: Contact guard assistance  Quality of Gait: step to pattern, cues for safety & awareness for lines  Distance: 20ft     Balance  Sitting - Static: Good  Sitting - Dynamic: Good  Standing - Static: Good;-  Standing - Dynamic: Fair;+    Pt amb to BR for toileting, Minimal Assistance to sit to toilet. Pt stood with Contact Guard Assistance, amb 2ft to sink for hand hygiene x 2 minutes then ambulated 20 ft with R/walker & sat to EOB, rested 3 minutes then completed sit to supine with [address]  Pt educated on prevention of sedentary complications     All lines intact, call light within reach, and patient positioned comfortably at end of treatment. All patient needs addressed prior to ending therapy session. Assessment   Body structures, Functions, Activity limitations: Decreased functional mobility ; Decreased strength;Decreased safe awareness;Decreased endurance;Decreased balance  Assessment: Pt requires continued skilled PT & is appropriate to D/C Home with HH PT to increase independence with functional mobility, balance, safety awareness & activity tolerance. Prognosis: Good  Decision Making: Medium Complexity  Exam: ROM, MMT, functional mobility, activity tolerance, Balance, & [employer] AM-[facility] 6 Clicks Basic Mobility   Clinical Presentation: evolving  Patient Education: PT POC, functional mobility, safety awareness, prevention of sedentary complications  REQUIRES PT FOLLOW UP: Yes  Activity Tolerance  Activity Tolerance: Patient limited by fatigue;Patient limited by endurance         Plan   Plan  Times per week: 1-2x/D,5-6D/week  Current Treatment Recommendations: Strengthening, Balance Training, Functional Mobility Training, Transfer Training, Gait Training, Home Exercise Program, Safety Education & Training, Patient/Caregiver Education & Training  Safety Devices  Type of devices: Bed alarm in place, Call light within reach, Chair alarm in place, Gait belt, Patient at risk for falls, Left in chair, Nurse notified    G-Code  PT G-Codes  Functional Assessment Tool Used: [place] AM-PAC  Score: 18  Functional Limitation: Mobility: Walking and moving around  Mobility: Walking and Moving Around Current Status (): At least 40 percent but less than 60 percent impaired, limited or restricted  Mobility: Walking and Moving Around Goal Status (): At least 1 percent but less than 20 percent impaired, limited or restricted  OutComes Score                                           AM-PAC Score  AM-PAC Inpatient Mobility Raw Score : 18  AM-PAC Inpatient T-Scale Score : 43.63  Mobility Inpatient CMS 0-100% Score: 46.58  Mobility Inpatient CMS G-Code Modifier : CK          Goals  Short term goals  Time Frame for Short term goals: 12 visits  Short term goal 1:  Inc bed-mobility & transfers to independent to enable pt to safely get in/OOB & return to [facility] & decrease risk for falls; Short term goal 2: Inc gait to amb 350ft or > indep w/ RW to enable pt to return to previous level of independence; Short term goal 3: Inc strength to [address] standing balance to good with device to facilitate pt independence for performance of ADL's & functional mobility, & reduce fall risk; Short term goal 4: Pt able to tolerate 30-40 min of activity to include 15-20 reps of ex & functional mobility including 5 minutes of standing to facilitate activity tolerance to [facility]; Short term goal 5: Pt to improve [place] AM-PAC score to 21/24 to demonstrate independence & safety with functional mobility for pt to D/C home safely;   Patient Goals   Patient goals : get my strength back       Therapy Time   Individual Concurrent Group Co-treatment   Time In  1245         Time Out  1328         Minutes  8901  [facility],

## 2018-12-11 VITALS
OXYGEN SATURATION: 95 % | DIASTOLIC BLOOD PRESSURE: 70 MMHG | TEMPERATURE: 98.6 F | BODY MASS INDEX: 27.86 KG/M2 | SYSTOLIC BLOOD PRESSURE: 130 MMHG | HEIGHT: 71 IN | RESPIRATION RATE: 20 BRPM | HEART RATE: 90 BPM | WEIGHT: 199 LBS

## 2018-12-11 PROBLEM — A41.51 E. COLI SEPSIS (HCC): Status: ACTIVE | Noted: 2018-12-10

## 2018-12-11 PROBLEM — B96.20 E. COLI UTI: Status: ACTIVE | Noted: 2018-12-09

## 2018-12-11 LAB
ANION GAP SERPL CALCULATED.3IONS-SCNC: 18 MMOL/L (ref 9–17)
BUN BLDV-MCNC: 40 MG/DL (ref 8–23)
BUN/CREAT BLD: 20 (ref 9–20)
CALCIUM SERPL-MCNC: 8.3 MG/DL (ref 8.6–10.4)
CHLORIDE BLD-SCNC: 107 MMOL/L (ref 98–107)
CO2: 20 MMOL/L (ref 20–31)
CREAT SERPL-MCNC: 1.96 MG/DL (ref 0.7–1.2)
CULTURE: ABNORMAL
GFR AFRICAN AMERICAN: 39 ML/MIN
GFR NON-AFRICAN AMERICAN: 32 ML/MIN
GFR SERPL CREATININE-BSD FRML MDRD: ABNORMAL ML/MIN/{1.73_M2}
GFR SERPL CREATININE-BSD FRML MDRD: ABNORMAL ML/MIN/{1.73_M2}
GLUCOSE BLD-MCNC: 156 MG/DL (ref 75–110)
GLUCOSE BLD-MCNC: 158 MG/DL (ref 70–99)
GLUCOSE BLD-MCNC: 160 MG/DL (ref 75–110)
GLUCOSE BLD-MCNC: 186 MG/DL (ref 75–110)
HCT VFR BLD CALC: 29.9 % (ref 41–53)
HEMOGLOBIN: 10 G/DL (ref 13.5–17.5)
HEMOGLOBIN: 10.3 G/DL (ref 13.5–17.5)
HEMOGLOBIN: 10.6 G/DL (ref 13.5–17.5)
INR BLD: 3.9
Lab: ABNORMAL
MCH RBC QN AUTO: 32.4 PG (ref 26–34)
MCHC RBC AUTO-ENTMCNC: 35.3 G/DL (ref 31–37)
MCV RBC AUTO: 91.6 FL (ref 80–100)
NRBC AUTOMATED: ABNORMAL PER 100 WBC
ORGANISM: ABNORMAL
ORGANISM: ABNORMAL
PDW BLD-RTO: 14.9 % (ref 11.5–14.5)
PLATELET # BLD: 58 K/UL (ref 130–400)
PMV BLD AUTO: 9 FL (ref 6–12)
POTASSIUM SERPL-SCNC: 3.6 MMOL/L (ref 3.7–5.3)
PROTHROMBIN TIME: 37.9 SEC (ref 9.7–11.6)
RBC # BLD: 3.27 M/UL (ref 4.5–5.9)
SODIUM BLD-SCNC: 145 MMOL/L (ref 135–144)
SPECIMEN DESCRIPTION: ABNORMAL
STATUS: ABNORMAL
WBC # BLD: 5.4 K/UL (ref 3.5–11)

## 2018-12-11 PROCEDURE — 2580000003 HC RX 258: Performed by: INTERNAL MEDICINE

## 2018-12-11 PROCEDURE — 6370000000 HC RX 637 (ALT 250 FOR IP): Performed by: NURSE PRACTITIONER

## 2018-12-11 PROCEDURE — 82947 ASSAY GLUCOSE BLOOD QUANT: CPT

## 2018-12-11 PROCEDURE — 6370000000 HC RX 637 (ALT 250 FOR IP): Performed by: INTERNAL MEDICINE

## 2018-12-11 PROCEDURE — 85027 COMPLETE CBC AUTOMATED: CPT

## 2018-12-11 PROCEDURE — 99232 SBSQ HOSP IP/OBS MODERATE 35: CPT | Performed by: INTERNAL MEDICINE

## 2018-12-11 PROCEDURE — 80048 BASIC METABOLIC PNL TOTAL CA: CPT

## 2018-12-11 PROCEDURE — 85610 PROTHROMBIN TIME: CPT

## 2018-12-11 PROCEDURE — 36415 COLL VENOUS BLD VENIPUNCTURE: CPT

## 2018-12-11 PROCEDURE — 97110 THERAPEUTIC EXERCISES: CPT

## 2018-12-11 PROCEDURE — APPSS15 APP SPLIT SHARED TIME 0-15 MINUTES: Performed by: NURSE PRACTITIONER

## 2018-12-11 PROCEDURE — 85018 HEMOGLOBIN: CPT

## 2018-12-11 RX ORDER — PANTOPRAZOLE SODIUM 40 MG/1
40 TABLET, DELAYED RELEASE ORAL
Qty: 30 TABLET | Refills: 3 | Status: SHIPPED | OUTPATIENT
Start: 2018-12-11 | End: 2018-12-11

## 2018-12-11 RX ORDER — CEPHALEXIN 250 MG/1
250 CAPSULE ORAL EVERY 8 HOURS SCHEDULED
Qty: 24 CAPSULE | Refills: 0 | Status: SHIPPED | OUTPATIENT
Start: 2018-12-11 | End: 2018-12-19

## 2018-12-11 RX ORDER — PANTOPRAZOLE SODIUM 40 MG/1
80 TABLET, DELAYED RELEASE ORAL DAILY
Qty: 60 TABLET | Refills: 3 | Status: SHIPPED | OUTPATIENT
Start: 2018-12-11

## 2018-12-11 RX ORDER — CEPHALEXIN 250 MG/1
250 CAPSULE ORAL EVERY 8 HOURS SCHEDULED
Status: DISCONTINUED | OUTPATIENT
Start: 2018-12-11 | End: 2018-12-11 | Stop reason: HOSPADM

## 2018-12-11 RX ADMIN — Medication 10 ML: at 09:28

## 2018-12-11 RX ADMIN — INSULIN LISPRO 2 UNITS: 100 INJECTION, SOLUTION INTRAVENOUS; SUBCUTANEOUS at 09:28

## 2018-12-11 RX ADMIN — CARVEDILOL 6.25 MG: 6.25 TABLET, FILM COATED ORAL at 09:27

## 2018-12-11 RX ADMIN — PANTOPRAZOLE SODIUM 40 MG: 40 TABLET, DELAYED RELEASE ORAL at 06:32

## 2018-12-11 RX ADMIN — PANTOPRAZOLE SODIUM 40 MG: 40 TABLET, DELAYED RELEASE ORAL at 17:13

## 2018-12-11 ASSESSMENT — PAIN SCALES - GENERAL: PAINLEVEL_OUTOF10: 0

## 2018-12-11 NOTE — DISCHARGE INSTR - COC
0  Last Weight:   Wt Readings from Last 1 Encounters:   12/09/18 199 lb (90.3 kg)     Mental Status:  oriented and alert    IV Access:  - None    Nursing Mobility/ADLs:  Walking   Assisted with walker or cane   Transfer  Independent  Bathing  Independent  [address] [place] [address]    Wound Care Documentation and Therapy:        Elimination:  Continence:   · Bowel: Yes  · Bladder: Yes  Urinary Catheter: None   Colostomy/Ileostomy/Ileal Conduit: No       Date of Last BM: 12/11/2018  No intake or output data in the 24 hours ending 12/11/18 0856  No intake/output data recorded. Safety Concerns: At Risk for Falls    Impairments/Disabilities:      None    Nutrition Therapy:  Current Nutrition Therapy:   - Oral Diet:  Carb Control 4 carbs/meal (1800kcals/day)    Routes of Feeding: Oral  Liquids: No Restrictions  Daily Fluid Restriction: no  Last Modified Barium Swallow with Video (Video Swallowing Test): not done    Treatments at the Time of Hospital Discharge:   Respiratory Treatments: na   Oxygen Therapy:  is not on home oxygen therapy. Ventilator:    - No ventilator support    Rehab Therapies: Physical Therapy  Weight Bearing Status/Restrictions: No weight bearing restirctions  Other Medical Equipment (for information only, NOT a DME order):  cane and walker  Other Treatments:   1. Skilled RN assessment   2.  Medication reconciliation     Patient's personal belongings (please select all that are sent with patient):  Pb    RN SIGNATURE:  Tammi Delacruz RN     CASE MANAGEMENT/SOCIAL WORK SECTION    Inpatient Status Date: 12/9    Readmission Risk Assessment Score:  Readmission Risk              Risk of Unplanned Readmission:        14           Discharging to Facility/ Agency   · Name: [facility]   · Phone: [phone]  · Fax: [phone]    Dialysis Facility (if applicable)   · Name:  · Address:  · Dialysis

## 2018-12-11 NOTE — PROGRESS NOTES
Physical Therapy  Facility/Department: [facility] PROGRESSIVE CARE  Daily Treatment Note  NAME: Layla Verdugo  : 1930  MRN: [id number]    Date of Service: 2018    Discharge Recommendations:  Home with Home health PT, Home with assist PRN        Patient Diagnosis(es): The primary encounter diagnosis was Gastrointestinal hemorrhage, unspecified gastrointestinal hemorrhage type. A diagnosis of Urinary tract infection without hematuria, site unspecified was also pertinent to this visit. has a past medical history of Achilles tendon pain; Anemia; Atrial fibrillation ([address] Cardiomyopathy ([address] Cardiomyopathy ([address] Cataract; CHF (congestive heart failure) ([address] Chronic kidney disease; COPD (chronic obstructive pulmonary disease) ([address] Dental disease; Diabetes mellitus ([address] Diabetic neuropathy ([address] DVT (deep venous thrombosis) ([address] Edema; Entropion; Gout; Hearing loss; Hyperlipidemia; Hypertension; and Ribs, multiple fractures. has a past surgical history that includes hernia repair (); lipoma resection (); Colonoscopy (); Achilles tendon surgery (Left, ); Tonsillectomy (); and Eye surgery (Right, 2016). Restrictions  Restrictions/Precautions  Restrictions/Precautions: General Precautions, Fall Risk, Bedrest with Bathroom Privileges  Required Braces or Orthoses?: No  Position Activity Restriction  Other position/activity restrictions: NPO except ice chips, telemetry, BR with BRP, O2, NC/2L/min  Subjective   General  Chart Reviewed: Yes  Additional Pertinent Hx: cardiomyopathy, atrial fibrillation, CHF, CKD3, DVT with PE  Response To Previous Treatment: Patient with no complaints from previous session.   Subjective  Subjective: Pt agreeable to PT  General Comment  Comments: RNVicki PT  Pain Screening  Patient Currently in Pain: Denies  Vital Signs  Patient Currently in Pain: Denies       Orientation  Orientation  Overall Orientation Status: Within Functional demonstrate independence & safety with functional mobility for pt to D/C home safely; Patient Goals   Patient goals : get my strength back    Plan    Plan  Times per week: 1-2x/D,5-6D/week  Current Treatment Recommendations: Strengthening, Balance Training, Functional Mobility Training, Transfer Training, Gait Training, Home Exercise Program, Safety Education & Training, Patient/Caregiver Education & Training  Safety Devices  Type of devices:  All fall risk precautions in place, Bed alarm in place, Call light within reach, Left in bed, Nurse notified (wife in room)     Therapy Time   Individual Concurrent Group Co-treatment   Time In 1152         Time Out 1215         Minutes [address]

## 2018-12-11 NOTE — PROGRESS NOTES
[facility]    Gastroenterology Daily Progress Note      Patient:   Darci Guzmán   :    1930   Facility:   [facility]  Date:     2018  Consultant:   Alyssa Beaver CNP      SUBJECTIVE  80 y.o. male admitted 2018 with GI bleed [K92.2] and seen for positive FOB. The pt was seen and examined. He continue to deny any overt bleeding. His INR today is 3.5. His hgb is 10.6. He continues to want to defer any endoscopy. OBJECTIVE  Scheduled Meds:   insulin lispro  0-12 Units Subcutaneous TID WC    insulin lispro  0-6 Units Subcutaneous Nightly    pantoprazole  40 mg Oral BID AC    sodium chloride flush  10 mL Intravenous 2 times per day    cefTRIAXone (ROCEPHIN) IV  1 g Intravenous Q24H    carvedilol  6.25 mg Oral BID       Vital Signs:  /69   Pulse 83   Temp 99.3 °F (37.4 °C) (Oral)   Resp 20   Ht 5' 11\" (1.803 m)   Wt 199 lb (90.3 kg)   SpO2 92%   BMI 27.75 kg/m²      Physical Exam:   General appearance: Alert & oriented, NAD  Lungs: CTA bilaterally    Heart: S1S2 RRR  Abdomen: Soft, Nontender, Not distended, BS WNL  Skin: No jaundice, No clubbing, No cyanosis    Lab and Imaging Review     CBC  Recent Labs      18   1605   12/10/18   0311   12/10/18   1912  18   0021  18   0623   WBC  6.6   --   6.4   --    --    --   5.4   HGB  9.1*   < >  9.6*   < >  9.8*  10.0*  10.6*   HCT  26.5*   --   27.7*   --    --    --   29.9*   MCV  91.6   --   92.6   --    --    --   91.6   PLT  104*   --   55*   --    --    --   58*    < > = values in this interval not displayed.        BMP  Recent Labs      18   1605  12/10/18   0311  18   0623   NA  142  141  145*   K  3.1*  3.9  3.6*   CL  106  107  107   CO2  22  23  20   BUN  39*  42*  40*   CREATININE  2.01*  2.33*  1.96*   GLUCOSE  169*  190*  158*   CALCIUM  8.5*  8.0*  8.3*       LFTS  No results for input(s): ALKPHOS, ALT, AST, PROT, BILITOT, BILIDIR, LABALBU in the last 72

## 2018-12-15 LAB — VITAMIN B1 WHOLE BLOOD: 44 NMOL/L (ref 70–180)

## 2022-11-22 ENCOUNTER — APPOINTMENT (OUTPATIENT)
Dept: GENERAL RADIOLOGY | Age: 87
DRG: 640 | End: 2022-11-22
Payer: COMMERCIAL

## 2022-11-22 ENCOUNTER — APPOINTMENT (OUTPATIENT)
Dept: CT IMAGING | Age: 87
DRG: 640 | End: 2022-11-22
Payer: COMMERCIAL

## 2022-11-22 ENCOUNTER — HOSPITAL ENCOUNTER (INPATIENT)
Age: 87
LOS: 7 days | Discharge: NURSING, REHAB OR LONG-TERM CARE | DRG: 640 | End: 2022-11-29
Attending: EMERGENCY MEDICINE | Admitting: FAMILY MEDICINE
Payer: COMMERCIAL

## 2022-11-22 DIAGNOSIS — N17.9 ACUTE KIDNEY INJURY (HCC): Primary | ICD-10-CM

## 2022-11-22 PROBLEM — E86.0 DEHYDRATION: Status: ACTIVE | Noted: 2022-11-22

## 2022-11-22 LAB
ABSOLUTE EOS #: 0.05 K/UL (ref 0–0.44)
ABSOLUTE IMMATURE GRANULOCYTE: 0.03 K/UL (ref 0–0.3)
ABSOLUTE LYMPH #: 0.84 K/UL (ref 1.1–3.7)
ABSOLUTE MONO #: 0.71 K/UL (ref 0.1–1.2)
ANION GAP SERPL CALCULATED.3IONS-SCNC: 19 MMOL/L (ref 9–17)
BASOPHILS # BLD: 1 % (ref 0–2)
BASOPHILS ABSOLUTE: 0.03 K/UL (ref 0–0.2)
BILIRUBIN URINE: NEGATIVE
BUN BLDV-MCNC: 94 MG/DL (ref 8–23)
BUN/CREAT BLD: 41 (ref 9–20)
CALCIUM SERPL-MCNC: 9.9 MG/DL (ref 8.6–10.4)
CHLORIDE BLD-SCNC: 88 MMOL/L (ref 98–107)
CO2: 27 MMOL/L (ref 20–31)
COLOR: YELLOW
CREAT SERPL-MCNC: 2.3 MG/DL (ref 0.7–1.2)
EKG ATRIAL RATE: 58 BPM
EKG Q-T INTERVAL: 432 MS
EKG QRS DURATION: 112 MS
EKG QTC CALCULATION (BAZETT): 486 MS
EKG R AXIS: -25 DEGREES
EKG T AXIS: -40 DEGREES
EKG VENTRICULAR RATE: 76 BPM
EOSINOPHILS RELATIVE PERCENT: 1 % (ref 1–4)
EPITHELIAL CELLS UA: NORMAL /HPF (ref 0–5)
FLU A ANTIGEN: NEGATIVE
FLU B ANTIGEN: NEGATIVE
GFR SERPL CREATININE-BSD FRML MDRD: 26 ML/MIN/1.73M2
GLUCOSE BLD-MCNC: 255 MG/DL (ref 75–110)
GLUCOSE BLD-MCNC: 293 MG/DL (ref 70–99)
GLUCOSE URINE: NEGATIVE
HCT VFR BLD CALC: 32.5 % (ref 40.7–50.3)
HEMOGLOBIN: 11.2 G/DL (ref 13–17)
IMMATURE GRANULOCYTES: 1 %
INR BLD: 2.6
KETONES, URINE: NEGATIVE
LEUKOCYTE ESTERASE, URINE: NEGATIVE
LYMPHOCYTES # BLD: 13 % (ref 24–43)
MCH RBC QN AUTO: 31 PG (ref 25.2–33.5)
MCHC RBC AUTO-ENTMCNC: 34.5 G/DL (ref 28.4–34.8)
MCV RBC AUTO: 90 FL (ref 82.6–102.9)
MONOCYTES # BLD: 11 % (ref 3–12)
NITRITE, URINE: NEGATIVE
NRBC AUTOMATED: 0 PER 100 WBC
PDW BLD-RTO: 13.9 % (ref 11.8–14.4)
PH UA: 7 (ref 5–8)
PLATELET # BLD: 111 K/UL (ref 138–453)
PMV BLD AUTO: 9.4 FL (ref 8.1–13.5)
POTASSIUM SERPL-SCNC: 3.1 MMOL/L (ref 3.7–5.3)
PROTEIN UA: NEGATIVE
PROTHROMBIN TIME: 27.4 SEC (ref 11.5–14.2)
RBC # BLD: 3.61 M/UL (ref 4.21–5.77)
RBC UA: NORMAL /HPF (ref 0–2)
SARS-COV-2, RAPID: NOT DETECTED
SEG NEUTROPHILS: 73 % (ref 36–65)
SEGMENTED NEUTROPHILS ABSOLUTE COUNT: 4.63 K/UL (ref 1.5–8.1)
SODIUM BLD-SCNC: 134 MMOL/L (ref 135–144)
SPECIFIC GRAVITY UA: 1.01 (ref 1–1.03)
SPECIMEN DESCRIPTION: NORMAL
TURBIDITY: CLEAR
URINE HGB: ABNORMAL
UROBILINOGEN, URINE: NORMAL
WBC # BLD: 6.3 K/UL (ref 3.5–11.3)
WBC UA: NORMAL /HPF (ref 0–5)

## 2022-11-22 PROCEDURE — 93010 ELECTROCARDIOGRAM REPORT: CPT | Performed by: INTERNAL MEDICINE

## 2022-11-22 PROCEDURE — 1200000000 HC SEMI PRIVATE

## 2022-11-22 PROCEDURE — 82947 ASSAY GLUCOSE BLOOD QUANT: CPT

## 2022-11-22 PROCEDURE — 84300 ASSAY OF URINE SODIUM: CPT

## 2022-11-22 PROCEDURE — 36415 COLL VENOUS BLD VENIPUNCTURE: CPT

## 2022-11-22 PROCEDURE — 87077 CULTURE AEROBIC IDENTIFY: CPT

## 2022-11-22 PROCEDURE — 86403 PARTICLE AGGLUT ANTBDY SCRN: CPT

## 2022-11-22 PROCEDURE — 70450 CT HEAD/BRAIN W/O DYE: CPT

## 2022-11-22 PROCEDURE — 2580000003 HC RX 258: Performed by: EMERGENCY MEDICINE

## 2022-11-22 PROCEDURE — 87181 SC STD AGAR DILUTION PER AGT: CPT

## 2022-11-22 PROCEDURE — 99285 EMERGENCY DEPT VISIT HI MDM: CPT

## 2022-11-22 PROCEDURE — 87804 INFLUENZA ASSAY W/OPTIC: CPT

## 2022-11-22 PROCEDURE — 96361 HYDRATE IV INFUSION ADD-ON: CPT

## 2022-11-22 PROCEDURE — 87186 SC STD MICRODIL/AGAR DIL: CPT

## 2022-11-22 PROCEDURE — 87205 SMEAR GRAM STAIN: CPT

## 2022-11-22 PROCEDURE — 71045 X-RAY EXAM CHEST 1 VIEW: CPT

## 2022-11-22 PROCEDURE — 93005 ELECTROCARDIOGRAM TRACING: CPT | Performed by: EMERGENCY MEDICINE

## 2022-11-22 PROCEDURE — 96360 HYDRATION IV INFUSION INIT: CPT

## 2022-11-22 PROCEDURE — 6370000000 HC RX 637 (ALT 250 FOR IP): Performed by: FAMILY MEDICINE

## 2022-11-22 PROCEDURE — 87040 BLOOD CULTURE FOR BACTERIA: CPT

## 2022-11-22 PROCEDURE — 85025 COMPLETE CBC W/AUTO DIFF WBC: CPT

## 2022-11-22 PROCEDURE — 6360000002 HC RX W HCPCS: Performed by: FAMILY MEDICINE

## 2022-11-22 PROCEDURE — 85610 PROTHROMBIN TIME: CPT

## 2022-11-22 PROCEDURE — 2580000003 HC RX 258: Performed by: FAMILY MEDICINE

## 2022-11-22 PROCEDURE — 87154 CUL TYP ID BLD PTHGN 6+ TRGT: CPT

## 2022-11-22 PROCEDURE — 80048 BASIC METABOLIC PNL TOTAL CA: CPT

## 2022-11-22 PROCEDURE — 87635 SARS-COV-2 COVID-19 AMP PRB: CPT

## 2022-11-22 PROCEDURE — 81001 URINALYSIS AUTO W/SCOPE: CPT

## 2022-11-22 RX ORDER — WARFARIN SODIUM 2 MG/1
2 TABLET ORAL
COMMUNITY

## 2022-11-22 RX ORDER — ALLOPURINOL 100 MG/1
100 TABLET ORAL DAILY
Status: DISCONTINUED | OUTPATIENT
Start: 2022-11-22 | End: 2022-11-29 | Stop reason: HOSPADM

## 2022-11-22 RX ORDER — SODIUM POLYSTYRENE SULFONATE 15 G/60ML
15 SUSPENSION ORAL; RECTAL
Status: ACTIVE | OUTPATIENT
Start: 2022-11-22 | End: 2022-11-23

## 2022-11-22 RX ORDER — PRAVASTATIN SODIUM 20 MG
20 TABLET ORAL NIGHTLY
Status: DISCONTINUED | OUTPATIENT
Start: 2022-11-22 | End: 2022-11-29 | Stop reason: HOSPADM

## 2022-11-22 RX ORDER — WARFARIN SODIUM 2 MG/1
4 TABLET ORAL
Status: COMPLETED | OUTPATIENT
Start: 2022-11-22 | End: 2022-11-22

## 2022-11-22 RX ORDER — ACETAMINOPHEN 650 MG/1
650 SUPPOSITORY RECTAL EVERY 6 HOURS PRN
Status: DISCONTINUED | OUTPATIENT
Start: 2022-11-22 | End: 2022-11-29 | Stop reason: HOSPADM

## 2022-11-22 RX ORDER — ACETAMINOPHEN 325 MG/1
650 TABLET ORAL EVERY 6 HOURS PRN
Status: DISCONTINUED | OUTPATIENT
Start: 2022-11-22 | End: 2022-11-29 | Stop reason: HOSPADM

## 2022-11-22 RX ORDER — CARVEDILOL 6.25 MG/1
6.25 TABLET ORAL 2 TIMES DAILY WITH MEALS
Status: DISCONTINUED | OUTPATIENT
Start: 2022-11-22 | End: 2022-11-29 | Stop reason: HOSPADM

## 2022-11-22 RX ORDER — DOXAZOSIN MESYLATE 4 MG/1
4 TABLET ORAL NIGHTLY
Status: DISCONTINUED | OUTPATIENT
Start: 2022-11-22 | End: 2022-11-29 | Stop reason: HOSPADM

## 2022-11-22 RX ORDER — SODIUM CHLORIDE 9 MG/ML
INJECTION, SOLUTION INTRAVENOUS PRN
Status: DISCONTINUED | OUTPATIENT
Start: 2022-11-22 | End: 2022-11-29 | Stop reason: HOSPADM

## 2022-11-22 RX ORDER — SODIUM CHLORIDE 0.9 % (FLUSH) 0.9 %
5-40 SYRINGE (ML) INJECTION EVERY 12 HOURS SCHEDULED
Status: DISCONTINUED | OUTPATIENT
Start: 2022-11-22 | End: 2022-11-29 | Stop reason: HOSPADM

## 2022-11-22 RX ORDER — SODIUM CHLORIDE 0.9 % (FLUSH) 0.9 %
5-40 SYRINGE (ML) INJECTION PRN
Status: DISCONTINUED | OUTPATIENT
Start: 2022-11-22 | End: 2022-11-29 | Stop reason: HOSPADM

## 2022-11-22 RX ORDER — POTASSIUM CHLORIDE 20 MEQ/1
40 TABLET, EXTENDED RELEASE ORAL ONCE
Status: COMPLETED | OUTPATIENT
Start: 2022-11-22 | End: 2022-11-22

## 2022-11-22 RX ORDER — CILOSTAZOL 100 MG/1
100 TABLET ORAL DAILY
Status: DISCONTINUED | OUTPATIENT
Start: 2022-11-22 | End: 2022-11-29 | Stop reason: HOSPADM

## 2022-11-22 RX ORDER — SODIUM CHLORIDE 9 MG/ML
INJECTION, SOLUTION INTRAVENOUS CONTINUOUS
Status: DISCONTINUED | OUTPATIENT
Start: 2022-11-22 | End: 2022-11-22

## 2022-11-22 RX ORDER — SODIUM CHLORIDE 9 MG/ML
INJECTION, SOLUTION INTRAVENOUS CONTINUOUS
Status: DISCONTINUED | OUTPATIENT
Start: 2022-11-22 | End: 2022-11-25

## 2022-11-22 RX ORDER — INSULIN LISPRO 100 [IU]/ML
0-8 INJECTION, SOLUTION INTRAVENOUS; SUBCUTANEOUS
Status: DISCONTINUED | OUTPATIENT
Start: 2022-11-23 | End: 2022-11-29 | Stop reason: HOSPADM

## 2022-11-22 RX ORDER — ONDANSETRON 2 MG/ML
4 INJECTION INTRAMUSCULAR; INTRAVENOUS EVERY 6 HOURS PRN
Status: DISCONTINUED | OUTPATIENT
Start: 2022-11-22 | End: 2022-11-29 | Stop reason: HOSPADM

## 2022-11-22 RX ORDER — DOXAZOSIN MESYLATE 4 MG/1
4 TABLET ORAL DAILY
Status: DISCONTINUED | OUTPATIENT
Start: 2022-11-22 | End: 2022-11-22

## 2022-11-22 RX ORDER — DEXTROSE MONOHYDRATE 100 MG/ML
INJECTION, SOLUTION INTRAVENOUS CONTINUOUS PRN
Status: DISCONTINUED | OUTPATIENT
Start: 2022-11-22 | End: 2022-11-29 | Stop reason: HOSPADM

## 2022-11-22 RX ORDER — PANTOPRAZOLE SODIUM 20 MG/1
20 TABLET, DELAYED RELEASE ORAL DAILY
Status: DISCONTINUED | OUTPATIENT
Start: 2022-11-22 | End: 2022-11-22 | Stop reason: ALTCHOICE

## 2022-11-22 RX ORDER — ISOSORBIDE MONONITRATE 30 MG/1
30 TABLET, EXTENDED RELEASE ORAL DAILY
Status: DISCONTINUED | OUTPATIENT
Start: 2022-11-22 | End: 2022-11-29 | Stop reason: HOSPADM

## 2022-11-22 RX ORDER — INSULIN LISPRO 100 [IU]/ML
0-4 INJECTION, SOLUTION INTRAVENOUS; SUBCUTANEOUS NIGHTLY
Status: DISCONTINUED | OUTPATIENT
Start: 2022-11-22 | End: 2022-11-29 | Stop reason: HOSPADM

## 2022-11-22 RX ORDER — ONDANSETRON 4 MG/1
4 TABLET, ORALLY DISINTEGRATING ORAL EVERY 8 HOURS PRN
Status: DISCONTINUED | OUTPATIENT
Start: 2022-11-22 | End: 2022-11-29 | Stop reason: HOSPADM

## 2022-11-22 RX ADMIN — SODIUM CHLORIDE: 9 INJECTION, SOLUTION INTRAVENOUS at 15:12

## 2022-11-22 RX ADMIN — POTASSIUM CHLORIDE 40 MEQ: 1500 TABLET, EXTENDED RELEASE ORAL at 23:09

## 2022-11-22 RX ADMIN — ONDANSETRON 4 MG: 2 INJECTION INTRAMUSCULAR; INTRAVENOUS at 17:54

## 2022-11-22 RX ADMIN — DOXAZOSIN 4 MG: 4 TABLET ORAL at 23:09

## 2022-11-22 RX ADMIN — PRAVASTATIN SODIUM 20 MG: 20 TABLET ORAL at 23:10

## 2022-11-22 RX ADMIN — SODIUM CHLORIDE: 9 INJECTION, SOLUTION INTRAVENOUS at 20:31

## 2022-11-22 RX ADMIN — WARFARIN SODIUM 4 MG: 2 TABLET ORAL at 23:09

## 2022-11-22 RX ADMIN — CARVEDILOL 6.25 MG: 6.25 TABLET, FILM COATED ORAL at 23:10

## 2022-11-22 ASSESSMENT — ENCOUNTER SYMPTOMS
SHORTNESS OF BREATH: 0
ABDOMINAL PAIN: 0
CONSTIPATION: 0
COUGH: 0
EYE REDNESS: 0
FACIAL SWELLING: 0
COLOR CHANGE: 0
VOMITING: 0
DIARRHEA: 0
EYE DISCHARGE: 0

## 2022-11-22 ASSESSMENT — PAIN - FUNCTIONAL ASSESSMENT: PAIN_FUNCTIONAL_ASSESSMENT: 0-10

## 2022-11-22 ASSESSMENT — PAIN SCALES - GENERAL: PAINLEVEL_OUTOF10: 0

## 2022-11-22 NOTE — H&P
History & Physical  [facility]    Adult Hospitalist      Name: General Magallon  MRN: [id number]     Acct: [de-identified]  Room: [facility]/24    Admit Date: 11/22/2022  2:40 PM  PCP: Canelo Vega MD    Primary Problem  Principal Problem:    Dehydration  Resolved Problems:    * No resolved hospital problems. *        Assesment:     Acute kidney injury  Acute dehydration/weakness  Coronary artery disease, native vessel  Atrial fibrillation with controlled rate  History of deep vein thrombosis with pulmonary embolism  Cardiomyopathy  Chronic kidney disease stage III  Diabetes mellitus type 2  Diabetic neuropathy  Essential hypertension  Peripheral artery disease  Gastroesophageal reflux disease without esophagitis  Mixed hyperlipidemia  Gout        Plan:     Admit to progressive  Monitor vitals closely  Keep SPO2 above 90%  I's and O's  IV fluids  Pain control  Antiemetics as needed  Urinary sodium  Recheck renal function  Resume essential home medication  Avoid nephrotoxic agents  CBC, BMP  Consult nephrology  Accu-Cheks before meals and at bedtime  Insulin sliding scale medium  Hypoglycemia protocol  DVT and GI prophylaxis. Chief Complaint:     Chief Complaint   Patient presents with    Fatigue         History of Present Illness:      General Magallon is a 80 y.o.  male who presents with Fatigue    Patient admitted through the emergency room where he presented with progressive weakness for last 2 days. And says he had been nauseous and vomited once today. Patient says he was not able to eat or drink much weakness continued to worsen after which she was brought to the emergency room patient says he does not understand why he was nauseous. He also says he had poor appetite for several days before that but he does not know why. Is having any fever or chills. Denies any respiratory infection, diarrhea or dysuria.   Patient states he has not had a bowel movement for 3 days    Patient says his wife passed away in February of this year. He denies any depression at this time    Patient denies any chest pain, dyspnea or orthopnea. Denies headache, photophobia or diplopia. Denies neck pain or back pain. Denies dysuria    I have personally reviewed the past medical history, past surgical history, medications, social history, and family history, and summarized in the note. Review of Systems:     All 10 point system is reviewed and negative otherwise mentioned in HPI. Past Medical History:     Past Medical History:   Diagnosis Date    Achilles tendon pain 1980's    Repair, Had DVT post surgery    Anemia     Atrial fibrillation (HCC)     Cardiomyopathy ([address]     Cardiomyopathy ([address]     Cataract     CHF (congestive heart failure) ([facility])     Chronic kidney disease     Stage 3    COPD (chronic obstructive pulmonary disease) ([address]     Dental disease     Diabetes mellitus ([address]     Type 2     Diabetic neuropathy (HCC)     Bilateral Lower legs distal to knees    DVT (deep venous thrombosis) ([address] 1980's    With PE     Edema     Bilateral Legs    Entropion 06/2016    Right Lower Lid    Gout     Hearing loss     Hyperlipidemia     Hypertension     Ribs, multiple fractures     Per [facility]        Past Surgical History:     Past Surgical History:   Procedure Laterality Date    ACHILLES TENDON SURGERY Left 1980's    Had DVT post surgery    COLONOSCOPY  1990's    EYE SURGERY Right 07/12/2016    Repair Entropion eye, Rt lower lid. Surgeon Don Watson at [facility]    [place]     2 Lt inguinal    LIPOMA RESECTION  1980's    TONSILLECTOMY  1940's        Medications Prior to Admission:       Prior to Admission medications    Medication Sig Start Date End Date Taking?  Authorizing Provider   pantoprazole (PROTONIX) 40 MG tablet Take 2 tablets by mouth daily 12/11/18   Moises Matthews,    ALLOPURINOL PO Take 100 mg by mouth daily     Historical Provider, MD   Warfarin Sodium (COUMADIN PO) Take 4 mg by mouth every evening    Historical Provider, MD   ISOSORBIDE PO Take 60 mg by mouth daily Imdur 60 mg 24 hr tablet    Historical Provider, MD   GABAPENTIN PO Take 300 mg by mouth 2 times daily     Historical Provider, MD   DOXAZOSIN MESYLATE PO Take by mouth daily    Historical Provider, MD   CARVEDILOL PO Take 6.25 mg by mouth 2 times daily     Historical Provider, MD   furosemide (LASIX) 40 MG tablet Take 40 mg by mouth daily    Historical Provider, MD   cilostazol (PLETAL) 100 MG tablet Take 100 mg by mouth daily    Historical Provider, MD   pravastatin (PRAVACHOL) 40 MG tablet Take 40 mg by mouth daily    Historical Provider, MD        Allergies:       Amoxicillin    Social History:     Tobacco:    reports that he has never smoked. He has never used smokeless tobacco.  Alcohol:      reports current alcohol use of about 7.0 standard drinks per week. Drug Use:  reports no history of drug use. Family History:     History reviewed. No pertinent family history.       Physical Exam:     Vitals:  BP (!) 144/60   Pulse 81   Temp 98.9 °F (37.2 °C) (Oral)   Resp 18   Ht 5' 11\" (1.803 m)   Wt 200 lb (90.7 kg)   SpO2 97%   BMI 27.89 kg/m²   Temp (24hrs), Av.9 °F (37.2 °C), Min:98.9 °F (37.2 °C), Max:98.9 °F (37.2 °C)      General appearance - alert, well appearing, and in no acute distress  Mental status - oriented to person, place, and time with normal affect  Head - normocephalic and atraumatic  Eyes - pupils equal and reactive, extraocular eye movements intact, conjunctiva clear  Ears - hearing appears to be intact  Nose - no drainage noted  Mouth - mucous membranes dry  Neck - supple, no carotid bruits, thyroid not palpable  Chest - clear to auscultation, normal effort  Heart - normal rate, regular rhythm, no murmur  Abdomen - soft, nontender, nondistended, bowel sounds present all four quadrants, no masses, hepatomegaly or splenomegaly  Neurological - normal speech, no focal findings or movement disorder noted, cranial nerves II through XII grossly intact  Extremities - peripheral pulses palpable, no pedal edema or calf pain with palpation. There is significant pigmentation. Bilateral lower extremity sensory neuropathy  Skin -bilateral lower extremity pigmentation. Data:     Labs:    Hematology:  Recent Labs     11/22/22  1454   WBC 6.3   RBC 3.61*   HGB 11.2*   HCT 32.5*   MCV 90.0   MCH 31.0   MCHC 34.5   RDW 13.9   *   MPV 9.4     Chemistry:  Recent Labs     11/22/22  1454   *   K 3.1*   CL 88*   CO2 27   GLUCOSE 293*   BUN 94*   CREATININE 2.30*   ANIONGAP 19*   LABGLOM 26*   CALCIUM 9.9     No results for input(s): PROT, LABALBU, LABA1C, Q1HYVCI, H3AYCFJ, FT4, TSH, AST, ALT, LDH, GGT, ALKPHOS, LABGGT, BILITOT, BILIDIR, AMMONIA, AMYLASE, LIPASE, LACTATE, CHOL, HDL, LDLCHOLESTEROL, CHOLHDLRATIO, TRIG, VLDL, BGQ06WC, PHENYTOIN, PHENYF, URICACID, POCGLU in the last 72 hours. Lab Results   Component Value Date    INR 3.9 12/11/2018    INR 5.6 (HH) 12/10/2018    PROTIME 37.9 (H) 12/11/2018    PROTIME 53.1 (H) 12/10/2018       Lab Results   Component Value Date/Time    SPECIAL 10 ML  RIGHT ARM 12/09/2018 04:05 PM    SPECIAL 17 ML LEFT ARM 12/09/2018 04:05 PM     Lab Results   Component Value Date/Time    CULTURE (A) 12/09/2018 04:05 PM     POSITIVE BLOOD CULTURE, RN NOTIFIED: Lennox VILCHIS ON 12/10/18 AT 0755    CULTURE DIRECT GRAM STAIN FROM BOTTLE: GRAM NEGATIVE RODS 12/09/2018 04:05 PM    CULTURE ID by PNAFISH: ESCHERICHIA COLI (A) 12/09/2018 04:05 PM    CULTURE ESCHERICHIA COLI (A) 12/09/2018 04:05 PM    CULTURE (A) 12/09/2018 04:05 PM     POSITIVE BLOOD CULTURE, RN NOTIFIED: Lennox VILCHIS ON 12/10/18 AT 0755    CULTURE DIRECT GRAM STAIN FROM BOTTLE: GRAM NEGATIVE RODS 12/09/2018 04:05 PM    CULTURE (A) 12/09/2018 04:05 PM     ESCHERICHIA COLI For susceptibility, refer to previous culture.        No results found for: POCPH, PHART, PH, POCPCO2, HTW5DAL, PCO2, POCPO2, PO2ART, PO2, POCHCO3, JWX7PTQ, HCO3, NBEA, PBEA, BEART, BE, THGBART, THB, MEI8TOC, DDLJ3UGE, K8MLAXIC, O2SAT, FIO2    Radiology:    CT HEAD WO CONTRAST    Result Date: 11/22/2022  No evidence for acute intracranial hemorrhage, territorial infarction or intracranial mass lesion. Mild chronic microangiopathic ischemic disease. Mild generalized volume loss. Complete opacification of right maxillary sinus, mild mucosal thickening of the left maxillary sinus. Moderate mucosal thickening of the ethmoidal air cells. Polypoid mucosal thickening within the right side of the nasopharynx. XR CHEST PORTABLE    Result Date: 11/22/2022  No acute cardiopulmonary process suspected         All radiological studies reviewed                Code Status:  Prior    Electronically signed by Diya Roach MD on 11/22/2022 at 5:43 PM     Copy sent to Dr. Loc Campos MD    This note was created with the assistance of a speech-recognition program.  Although the intention is to generate a document that actually reflects the content of the visit, no guarantees can be provided that every mistake has been identified and corrected by editing. Note was updated later by me after  physical examination and  completion of the assessment.

## 2022-11-22 NOTE — ED NOTES
Pt presents to ed c/o fatigue. He states it started Sunday and he felt a little better yesterday but fell today. He denies pain, shortness of breath, or fever. MM pink and dry. He is in a-fib with a history of a-fib. He is alert and oriented.       Jm Dietz RN  11/22/22 [id number]

## 2022-11-22 NOTE — ED PROVIDER NOTES
[facility] ED  EMERGENCY DEPARTMENT ENCOUNTER      Pt Name: Judy Davis  MRN: [id number]  [place] 6/21/1930  Date of evaluation: 11/22/2022  Provider: Mitch Denson MD    [address] [facility]       Chief Complaint   Patient presents with    Fatigue         HISTORY OF PRESENT ILLNESS  (Location/Symptom, Timing/Onset, Context/Setting, Quality, Duration, Modifying Factors, Severity.)   Judy Davis is a 80 y.o. male who presents to the emergency department for chief complaint of not feeling well. For the past 2 days he has been weak. He has not had a fever and does not complain of a cough or chest pain or shortness of breath. No abdominal pain. He vomited once yesterday. No diarrhea or dysuria. He has no pain. He has been vaccinated for COVID but not influenza. Nursing Notes were reviewed.     ALLERGIES     Amoxicillin    CURRENT MEDICATIONS       Previous Medications    ALLOPURINOL PO    Take 100 mg by mouth daily     CARVEDILOL PO    Take 6.25 mg by mouth 2 times daily     CILOSTAZOL (PLETAL) 100 MG TABLET    Take 100 mg by mouth daily    DOXAZOSIN MESYLATE PO    Take by mouth daily    FUROSEMIDE (LASIX) 40 MG TABLET    Take 40 mg by mouth daily    GABAPENTIN PO    Take 300 mg by mouth 2 times daily     ISOSORBIDE PO    Take 60 mg by mouth daily Imdur 60 mg 24 hr tablet    PANTOPRAZOLE (PROTONIX) 40 MG TABLET    Take 2 tablets by mouth daily    PRAVASTATIN (PRAVACHOL) 40 MG TABLET    Take 40 mg by mouth daily    WARFARIN SODIUM (COUMADIN PO)    Take 4 mg by mouth every evening       PAST MEDICAL HISTORY         Diagnosis Date    Achilles tendon pain 1980's    Repair, Had DVT post surgery    Anemia     Atrial fibrillation (HCC)     Cardiomyopathy (HCC)     Cardiomyopathy (HCC)     Cataract     CHF (congestive heart failure) (HCC)     Chronic kidney disease     Stage 3    COPD (chronic obstructive pulmonary disease) (HCC)     Dental disease     Diabetes mellitus (HCC)     Type 2     Diabetic neuropathy ([facility] [address]     Bilateral Lower legs distal to knees    DVT (deep venous thrombosis) ([facility] [address] 1980's    With PE     Edema     Bilateral Legs    Entropion 06/2016    Right Lower Lid    Gout     Hearing loss     Hyperlipidemia     Hypertension     Ribs, multiple fractures     Per [facility] records       SURGICAL HISTORY           Procedure Laterality Date    ACHILLES TENDON SURGERY Left 1980's    Had DVT post surgery    COLONOSCOPY  1990's    EYE SURGERY Right 07/12/2016    Repair Entropion eye, Rt lower lid. Surgeon Krishan Jamison at [facility]    [place]     2 Lt inguinal    LIPOMA RESECTION  1980's    TONSILLECTOMY  1940's         FAMILY HISTORY     History reviewed. No pertinent family history. No family status information on file. SOCIAL HISTORY      reports that he has never smoked. He has never used smokeless tobacco. He reports current alcohol use of about 7.0 standard drinks per week. He reports that he does not use drugs. REVIEW OF SYSTEMS    (2-9 systems for level 4, 10 or more for level 5)     Review of Systems   Constitutional:  Positive for fatigue. Negative for chills and fever. HENT:  Negative for congestion, ear discharge and facial swelling. Eyes:  Negative for discharge and redness. Respiratory:  Negative for cough and shortness of breath. Cardiovascular:  Negative for chest pain. Gastrointestinal:  Negative for abdominal pain, constipation, diarrhea and vomiting. Genitourinary:  Negative for dysuria and hematuria. Musculoskeletal:  Negative for arthralgias. Skin:  Negative for color change and rash. Neurological:  Negative for syncope, numbness and headaches. Hematological:  Negative for adenopathy. Psychiatric/Behavioral:  Negative for confusion. The patient is not nervous/anxious. Except as noted above the remainder of the review of systems was reviewed and negative.      PHYSICAL EXAM    (up to 7 for level 4, 8 or more for level 5) Vitals:    11/22/22 1502 11/22/22 1503 11/22/22 1504 11/22/22 1540   BP:    (!) 144/60   Pulse: 86  81    Resp:  18     Temp:   98.9 °F (37.2 °C)    TempSrc:   Oral    SpO2: 98% 97%     Weight: 200 lb (90.7 kg)      Height: 5' 11\" (1.803 m)          Physical Exam  Constitutional:       General: He is not in acute distress. Appearance: He is well-developed. He is not diaphoretic. HENT:      Head: Normocephalic and atraumatic. Eyes:      General: No scleral icterus. Right eye: No discharge. Left eye: No discharge. Cardiovascular:      Rate and Rhythm: Normal rate. Rhythm irregular. Pulmonary:      Effort: Pulmonary effort is normal. No respiratory distress. Breath sounds: Normal breath sounds. No stridor. No wheezing or rales. Abdominal:      General: There is no distension. Palpations: Abdomen is soft. Tenderness: There is no abdominal tenderness. Musculoskeletal:         General: Normal range of motion. Cervical back: Neck supple. Lymphadenopathy:      Cervical: No cervical adenopathy. Skin:     General: Skin is warm and dry. Findings: No erythema or rash. Neurological:      Mental Status: He is alert and oriented to person, place, and time.    Psychiatric:         Behavior: Behavior normal.           DIAGNOSTIC RESULTS     EKG: All EKG's are interpreted by the Emergency Department Physician who either signs or Co-signs this chart in the absence of a cardiologist.    EKG on my interpretation shows atrial fibrillation    RADIOLOGY:   Non-plain film images such as CT, Ultrasound and MRI are read by the radiologist. Toyin Castellanos radiographic images are visualized and preliminarily interpreted by the emergency physician with the below findings:    Interpretation per the Radiologist below, if available at the time of this note:    CT HEAD WO CONTRAST    Result Date: 11/22/2022  EXAMINATION: CT OF THE HEAD WITHOUT CONTRAST  11/22/2022 3:28 pm TECHNIQUE: CT of the head was performed without the administration of intravenous contrast. Automated exposure control, iterative reconstruction, and/or weight based adjustment of the mA/kV was utilized to reduce the radiation dose to as low as reasonably achievable. COMPARISON: None. HISTORY: ORDERING SYSTEM PROVIDED HISTORY: fall TECHNOLOGIST PROVIDED HISTORY: fall Decision Support Exception - unselect if not a suspected or confirmed emergency medical condition->Emergency Medical Condition (MA) Reason for Exam: Pt states felt weak today, so laid down on floor and unable to get up. No reported head injury. Pt on anticoagulants. FINDINGS: There is no acute infarction, intracranial hemorrhage or intracranial mass lesion. No mass effect, midline shift or extra-axial collection is noted. There are mild nonspecific foci of periventricular and subcortical cerebral white matter hypodensity, most likely representing chronic microangiopathic disease in this age group. The brain parenchyma is otherwise normal. The cerebellar tonsils are in normal position. The ventricles, sulci, and cisterns are mildly prominent suggestive of mild generalized volume loss. The globes and orbits are within normal limits. Complete opacification of right maxillary sinus, mild mucosal thickening of the left maxillary sinus. Moderate mucosal thickening of the ethmoidal air cells. Polypoid mucosal thickening within the right side of the nasopharynx. The visualized extracranial structures including paranasal sinuses and mastoid air cells are otherwise unremarkable. No fracture is identified. No evidence for acute intracranial hemorrhage, territorial infarction or intracranial mass lesion. Mild chronic microangiopathic ischemic disease. Mild generalized volume loss. Complete opacification of right maxillary sinus, mild mucosal thickening of the left maxillary sinus. Moderate mucosal thickening of the ethmoidal air cells.  Polypoid mucosal thickening within the right side of the nasopharynx. XR CHEST PORTABLE    Result Date: 11/22/2022  EXAMINATION: ONE XRAY VIEW OF THE CHEST 11/22/2022 3:21 pm COMPARISON: 12/09/2018 HISTORY: ORDERING SYSTEM PROVIDED HISTORY: weak TECHNOLOGIST PROVIDED HISTORY: weak Reason for Exam: fatigue Additional signs and symptoms: fatigue and weak FINDINGS: Stable cardiomegaly with calcific plaque of the thoracic aorta. No signs of pulmonary vascular congestion. Aside from mild chronic appearing changes lungs appear grossly clear. Minimal subsegmental atelectasis/scarring left lung base. No new focal lung consolidation. No large pleural effusions. Bones are osteopenic with degenerative changes. Old left rib fractures. No acute cardiopulmonary process suspected       LABS:  Labs Reviewed   CBC WITH AUTO DIFFERENTIAL - Abnormal; Notable for the following components:       Result Value    RBC 3.61 (*)     Hemoglobin 11.2 (*)     Hematocrit 32.5 (*)     Platelets 917 (*)     Seg Neutrophils 73 (*)     Lymphocytes 13 (*)     Immature Granulocytes 1 (*)     Absolute Lymph # 0.84 (*)     All other components within normal limits   BASIC METABOLIC PANEL - Abnormal; Notable for the following components:    Glucose 293 (*)     BUN 94 (*)     Creatinine 2.30 (*)     Est, Glom Filt Rate 26 (*)     Bun/Cre Ratio 41 (*)     Sodium 134 (*)     Potassium 3.1 (*)     Chloride 88 (*)     Anion Gap 19 (*)     All other components within normal limits   URINALYSIS - Abnormal; Notable for the following components:    Urine Hgb TRACE (*)     All other components within normal limits   RAPID INFLUENZA A/B ANTIGENS   COVID-19, RAPID   MICROSCOPIC URINALYSIS       All other labs were within normal range or not returned as of this dictation.     EMERGENCY DEPARTMENT COURSE and DIFFERENTIAL DIAGNOSIS/MDM:   Vitals:    Vitals:    11/22/22 1502 11/22/22 1503 11/22/22 1504 11/22/22 1540   BP:    (!) 144/60   Pulse: 86  81    Resp:  18     Temp:   98.9 °F (37.2 °C) TempSrc:   Oral    SpO2: 98% 97%     Weight: 200 lb (90.7 kg)      Height: 5' 11\" (1.803 m)          Orders Placed This Encounter   Medications    0.9 % sodium chloride infusion       Medical Decision Making: His work-up shows that he is dehydrated and he is being admitted. Findings are discussed with the patient and his son. CONSULTS:  IP CONSULT TO HOSPITALIST    PROCEDURES:  None    FINAL IMPRESSION      1. Acute kidney injury [facility])          DISPOSITION/PLAN   DISPOSITION Decision To Admit 11/22/2022 05:04:14 PM      PATIENT REFERRED TO:   No follow-up provider specified. DISCHARGE MEDICATIONS:     New Prescriptions    No medications on file       The care is provided during an unprecedented national emergency due to the novel coronavirus, COVID-19.     (Please note that portions of this note were completed with a voice recognition program.  Efforts were made to edit the dictations but occasionally words are mis-transcribed.)    Saba Moncada MD  Attending Emergency Physician            Saba Moncada MD  11/22/22 5875

## 2022-11-22 NOTE — ED NOTES
ED to inpatient nurses report     Chief Complaint   Patient presents with    Fatigue      Present to ED from home with fatigue since Sunday. He fell today. LOC: alert and orientated to name, place, date  Vital signs   Vitals:    11/22/22 1502 11/22/22 1503 11/22/22 1504 11/22/22 1540   BP:    (!) 144/60   Pulse: 86  81    Resp:  18     Temp:   98.9 °F (37.2 °C)    TempSrc:   Oral    SpO2: 98% 97%     Weight: 200 lb (90.7 kg)      Height: 5' 11\" (1.803 m)         Oxygen Baseline room air    Current needs required none     LDAs:   Peripheral IV 11/22/22 Right Antecubital (Active)   Line Status Blood return noted 11/22/22 1502     Mobility: Independent  Fall Risk:  increased due to his weakness and THOMAS  Pending ED orders: none  Present condition: good  Code Status: full  Consults: IP CONSULT TO HOSPITALIST  IP CONSULT TO PHARMACY  [x]  Hospitalist  Completed  [x] yes [] no Who:   []  Medicine  Completed  [] yes [] No Who:   []  Cardiology  Completed  [] yes [] No Who:   []  GI   Completed  [] yes [] No Who:   []  Neurology  Completed  [] yes [] No Who:   []  Nephrology Completed  [] yes [] No Who:    []  Vascular  Completed  [] yes [] No Who:   []  Ortho  Completed  [] yes [] No Who:     []  Surgery  Completed  [] yes [] No Who:    []  Urology  Completed  [] yes [] No Who:    []  CT Surgery Completed  [] yes [] No Who:   []  Podiatry  Completed  [] yes [] No Who:    []  Other    Completed  [] yes [] No Who:  Interventions: labs, covid and flu swab, CT head, cxr, ekg  Important Events: Pt has been weak and not feeling good since Sunday and he has not been eating and drinking good. He is getting admitted for Thomas.           Electronically signed by Isaiah Hendricks RN on 11/22/2022 at 5:48 PM       Jm Dietz RN  11/22/22 5116

## 2022-11-23 PROBLEM — E43 SEVERE MALNUTRITION (HCC): Status: ACTIVE | Noted: 2022-11-23

## 2022-11-23 LAB
ALBUMIN SERPL-MCNC: 3.7 G/DL (ref 3.5–5.2)
ALP BLD-CCNC: 90 U/L (ref 40–129)
ALT SERPL-CCNC: <5 U/L (ref 5–41)
ANION GAP SERPL CALCULATED.3IONS-SCNC: 12 MMOL/L (ref 9–17)
AST SERPL-CCNC: 11 U/L
BILIRUB SERPL-MCNC: 0.9 MG/DL (ref 0.3–1.2)
BUN BLDV-MCNC: 86 MG/DL (ref 8–23)
BUN/CREAT BLD: 40 (ref 9–20)
CALCIUM SERPL-MCNC: 9.4 MG/DL (ref 8.6–10.4)
CHLORIDE BLD-SCNC: 98 MMOL/L (ref 98–107)
CO2: 30 MMOL/L (ref 20–31)
CREAT SERPL-MCNC: 2.14 MG/DL (ref 0.7–1.2)
GFR SERPL CREATININE-BSD FRML MDRD: 28 ML/MIN/1.73M2
GLUCOSE BLD-MCNC: 181 MG/DL (ref 75–110)
GLUCOSE BLD-MCNC: 201 MG/DL (ref 70–99)
GLUCOSE BLD-MCNC: 249 MG/DL (ref 75–110)
GLUCOSE BLD-MCNC: 260 MG/DL (ref 75–110)
GLUCOSE BLD-MCNC: 294 MG/DL (ref 75–110)
INR BLD: 2.6
MAGNESIUM: 2.4 MG/DL (ref 1.6–2.6)
POTASSIUM SERPL-SCNC: 3 MMOL/L (ref 3.7–5.3)
PROTHROMBIN TIME: 27.7 SEC (ref 11.5–14.2)
SODIUM BLD-SCNC: 140 MMOL/L (ref 135–144)
SODIUM,UR: 87 MMOL/L
TOTAL PROTEIN: 6.5 G/DL (ref 6.4–8.3)

## 2022-11-23 PROCEDURE — 1200000000 HC SEMI PRIVATE

## 2022-11-23 PROCEDURE — 6360000002 HC RX W HCPCS: Performed by: FAMILY MEDICINE

## 2022-11-23 PROCEDURE — 97535 SELF CARE MNGMENT TRAINING: CPT

## 2022-11-23 PROCEDURE — 97112 NEUROMUSCULAR REEDUCATION: CPT

## 2022-11-23 PROCEDURE — 2580000003 HC RX 258: Performed by: FAMILY MEDICINE

## 2022-11-23 PROCEDURE — 97162 PT EVAL MOD COMPLEX 30 MIN: CPT

## 2022-11-23 PROCEDURE — 97530 THERAPEUTIC ACTIVITIES: CPT

## 2022-11-23 PROCEDURE — 83735 ASSAY OF MAGNESIUM: CPT

## 2022-11-23 PROCEDURE — 97166 OT EVAL MOD COMPLEX 45 MIN: CPT

## 2022-11-23 PROCEDURE — 6370000000 HC RX 637 (ALT 250 FOR IP): Performed by: FAMILY MEDICINE

## 2022-11-23 PROCEDURE — 82947 ASSAY GLUCOSE BLOOD QUANT: CPT

## 2022-11-23 PROCEDURE — 85610 PROTHROMBIN TIME: CPT

## 2022-11-23 PROCEDURE — 36415 COLL VENOUS BLD VENIPUNCTURE: CPT

## 2022-11-23 PROCEDURE — 97116 GAIT TRAINING THERAPY: CPT

## 2022-11-23 PROCEDURE — 80053 COMPREHEN METABOLIC PANEL: CPT

## 2022-11-23 RX ORDER — ISOSORBIDE MONONITRATE 60 MG/1
30 TABLET, EXTENDED RELEASE ORAL DAILY
COMMUNITY

## 2022-11-23 RX ORDER — POTASSIUM CHLORIDE 20 MEQ/1
40 TABLET, EXTENDED RELEASE ORAL ONCE
Status: COMPLETED | OUTPATIENT
Start: 2022-11-23 | End: 2022-11-23

## 2022-11-23 RX ORDER — MIRTAZAPINE 7.5 MG/1
7.5 TABLET, FILM COATED ORAL NIGHTLY
Status: DISCONTINUED | OUTPATIENT
Start: 2022-11-23 | End: 2022-11-29 | Stop reason: HOSPADM

## 2022-11-23 RX ORDER — WARFARIN SODIUM 2 MG/1
4 TABLET ORAL
Status: COMPLETED | OUTPATIENT
Start: 2022-11-23 | End: 2022-11-23

## 2022-11-23 RX ORDER — POTASSIUM CHLORIDE 20 MEQ/1
40 TABLET, EXTENDED RELEASE ORAL PRN
Status: DISCONTINUED | OUTPATIENT
Start: 2022-11-23 | End: 2022-11-29 | Stop reason: HOSPADM

## 2022-11-23 RX ORDER — POTASSIUM CHLORIDE 7.45 MG/ML
10 INJECTION INTRAVENOUS PRN
Status: DISCONTINUED | OUTPATIENT
Start: 2022-11-23 | End: 2022-11-29 | Stop reason: HOSPADM

## 2022-11-23 RX ORDER — MAGNESIUM SULFATE IN WATER 40 MG/ML
2000 INJECTION, SOLUTION INTRAVENOUS PRN
Status: DISCONTINUED | OUTPATIENT
Start: 2022-11-23 | End: 2022-11-29 | Stop reason: HOSPADM

## 2022-11-23 RX ORDER — SPIRONOLACTONE 25 MG/1
12.5 TABLET ORAL DAILY
Status: ON HOLD | COMMUNITY
End: 2022-11-29 | Stop reason: HOSPADM

## 2022-11-23 RX ADMIN — CARVEDILOL 6.25 MG: 6.25 TABLET, FILM COATED ORAL at 18:41

## 2022-11-23 RX ADMIN — INSULIN LISPRO 4 UNITS: 100 INJECTION, SOLUTION INTRAVENOUS; SUBCUTANEOUS at 12:48

## 2022-11-23 RX ADMIN — ALLOPURINOL 100 MG: 100 TABLET ORAL at 09:29

## 2022-11-23 RX ADMIN — ISOSORBIDE MONONITRATE 30 MG: 30 TABLET, EXTENDED RELEASE ORAL at 09:29

## 2022-11-23 RX ADMIN — MIRTAZAPINE 7.5 MG: 7.5 TABLET ORAL at 21:06

## 2022-11-23 RX ADMIN — CEFTRIAXONE SODIUM 1000 MG: 1 INJECTION, POWDER, FOR SOLUTION INTRAMUSCULAR; INTRAVENOUS at 12:15

## 2022-11-23 RX ADMIN — POTASSIUM CHLORIDE 40 MEQ: 1500 TABLET, EXTENDED RELEASE ORAL at 09:28

## 2022-11-23 RX ADMIN — INSULIN LISPRO 2 UNITS: 100 INJECTION, SOLUTION INTRAVENOUS; SUBCUTANEOUS at 18:41

## 2022-11-23 RX ADMIN — WARFARIN SODIUM 4 MG: 2 TABLET ORAL at 18:41

## 2022-11-23 RX ADMIN — CARVEDILOL 6.25 MG: 6.25 TABLET, FILM COATED ORAL at 09:32

## 2022-11-23 RX ADMIN — DOXAZOSIN 4 MG: 4 TABLET ORAL at 21:06

## 2022-11-23 RX ADMIN — CILOSTAZOL 100 MG: 100 TABLET ORAL at 09:28

## 2022-11-23 RX ADMIN — PRAVASTATIN SODIUM 20 MG: 20 TABLET ORAL at 21:06

## 2022-11-23 ASSESSMENT — PAIN SCALES - GENERAL: PAINLEVEL_OUTOF10: 0

## 2022-11-23 NOTE — CARE COORDINATION
11/23/22 1610   Service Assessment   Patient Orientation Alert and Oriented;Person;Place;Situation;Self   Cognition Alert   History Provided By Patient   Primary Caregiver Other (Comment)  (guardian trudy 12 hrs a week.)   [address] is: Legal Next of Yair Waggoner   PCP Verified by CM Yes   Last Visit to PCP Within last 6 months   Prior Functional Level Assistance with the following:;Housework; Shopping;Cooking   Current Functional Level Assistance with the following:;Cooking;Housework; Shopping   Can patient return to prior living arrangement Unknown at present   Ability to make needs known: Good   Family able to assist with home care needs: Other (comment)  (lives at [facility])   Would you like for me to discuss the discharge plan with any other family members/significant others, and if so, who? No   Community Resources Assisted Living   Social/Functional History   Type of Home Senior housing apartment   Home Layout One level   Home Access Elevator   Bathroom Shower/Tub Tub/Shower unit   [address] [facility] [address], rolling;Cane   Receives Help From Family;Personal care attendant   ADL C/ [address] Needs assistance   Meal Prep Minimal   Laundry Moderate   Vacuuming Moderate   Cleaning Moderate   Shopping Maximal   Ambulation Assistance Independent   Transfer Assistance Independent   Active  No   Occupation Retired   Discharge Planning   Type of [address] Prior To Admission Other (Comment)  (guardigabriel yates for shopping and laundry etc... 12 hrs a week)   [address]   DME Ordered?  No   Type of Home Care Services None   Patient expects to be discharged to: Assisted living   Follow Up Appointment: Best Day/Time  Monday AM   One/Two Story Residence One story   Lift Chair Available No   History of falls? [address] Provided? No   Mode of Transport at Discharge Other (see comment)  (family of transportation)   Confirm Follow Up Transport Family   Condition of Participation: Discharge Planning   The Plan for Transition of Care is related to the following treatment goals: Patient refusing IP rehab and HC. Lives at [facility] independent living. Has 2 sons. The Patient and/or Patient Representative was provided with a Choice of Provider? Patient   The Patient and/Or Patient Representative agree with the Discharge Plan? Yes   Freedom of Choice list was provided with basic dialogue that supports the patient's individualized plan of care/goals, treatment preferences, and shares the quality data associated with the providers? Yes   Pharmacy is [facility] on [address]. Refusing inpatient rehab and [facility] Has guardian angels 12 hrs a week. Uses a walker and cane. Cont' to follow.

## 2022-11-23 NOTE — PROGRESS NOTES
Transitions of Care Pharmacy Service   Medication Review    The patient's list of current home medications has been reviewed. Source(s) of information: spoke to patient and [facility] active medication list     Based on information provided by the above source(s), I have updated the patient's home med list as described below. I changed or updated the following medications on the patient's home medication list:  Discontinued pantoprazole (PROTONIX) 40 MG tablet     Added spironolactone (ALDACTONE) 25 MG tablet  darbepoetin calos-polysorbate (ARANESP) 40 MCG/0.4ML SOSY injection  Cholecalciferol (VITAMIN D3) 1.25 MG (88331 UT) TABS     Adjusted   isosorbide mononitrate (IMDUR) 60 MG extended release tablet  allopurinol (ZYLOPRIM) 100 MG tablet  gabapentin (NEURONTIN) 300 MG capsule  pravastatin (PRAVACHOL) 40 MG tablet  carvedilol (COREG) 6.25 MG tablet  doxazosin (CARDURA) 4 MG tablet  warfarin (COUMADIN) 2 MG tablet     Other Notes None            Please feel free to call me with any questions about this encounter. Thank you. This note will be reviewed and co-signed by the Trans[facility] Pharmacist. The pharmacist will review inpatient orders and contact the physician about any discrepancies. Alva Bowman, pharmacy technician  Transitions [facility] Pharmacy Service  Phone:  [phone]  Fax: [phone]      Electronically signed by Alva Bowman on 11/23/2022 at 11:48 AM         Prior to Admission medications    Medication Sig   isosorbide mononitrate (IMDUR) 60 MG extended release tablet Take 30 mg by mouth daily   spironolactone (ALDACTONE) 25 MG tablet Take 12.5 mg by mouth daily   darbepoetin calos-polysorbate (ARANESP) 40 MCG/0.4ML SOSY injection Inject 40 mcg as directed every 30 days   warfarin (COUMADIN) 2 MG tablet Take 2 mg by mouth Twice a Week Monday AND FRIDAY   Cholecalciferol (VITAMIN D3) 1.25 MG (03921 UT) TABS Take 1 tablet by mouth every 30 days Last Friday of the month. allopurinol (ZYLOPRIM) 100 MG tablet Take 100 mg by mouth daily    warfarin (COUMADIN) 2 MG tablet Take 4 mg by mouth Five times weekly Tuesday, Wednesday, Thursday,Saturday and Sunday.    gabapentin (NEURONTIN) 300 MG capsule Take 300 mg by mouth 2 times daily    doxazosin (CARDURA) 4 MG tablet Take 4 mg by mouth at bedtime   carvedilol (COREG) 6.25 MG tablet Take 6.25 mg by mouth 2 times daily    furosemide (LASIX) 40 MG tablet Take 40 mg by mouth 2 times daily (before meals)   cilostazol (PLETAL) 100 MG tablet Take 100 mg by mouth daily   pravastatin (PRAVACHOL) 40 MG tablet Take 20 mg by mouth at bedtime

## 2022-11-23 NOTE — PROGRESS NOTES
Warfarin Dosing - Pharmacy Consult Note  Consulting Provider: Dr Zuly Oquendo  Indication:  History of  VTE/PE and Atrial Fibrillation  Warfarin Dose prior to admission: 2 mg Mon/Fri and 4 mg all other days of the week   Concurrent anticoagulants/antiplatelets: None  Significant Drug Interactions:  Allopurinol (home med)  Recent Labs     11/22/22  1454 11/22/22  2130 11/23/22  0645   INR  --  2.6 2.6   HGB 11.2*  --   --    *  --   --    LABALBU  --   --  3.7     Recent warfarin administrations                     warfarin (COUMADIN) tablet 4 mg (mg) 4 mg Given 11/22/22 2309                   Date   INR    Dose  11/22     2.6        4 mg   11/23     2.6      Assessment/Plan  (Goal INR: 2 - 3)  Give warfarin 4 mg today. Re-check INR in am.    Active problem list reviewed. INR orders are placed. Chart reviewed for pertinent labs, drug/diet interactions, and past doses. Documentation of patient's clinical condition was reviewed. Pharmacy Dosing:  Pharmacy will continue to follow.

## 2022-11-23 NOTE — CONSULTS
Warfarin Dosing - Pharmacy Consult Note  Consulting Provider: Dr Kira Albarran  Indication:  History of  VTE/PE and Atrial Fibrillation  Warfarin Dose prior to admission: 2 mg Mon/Fri and 4 mg all other days of the week   Concurrent anticoagulants/antiplatelets: None  Significant Drug Interactions:  Allopurinol (home med)  Recent Labs     11/22/22  1454 11/22/22  2130   INR  --  2.6   HGB 11.2*  --    *  --      Recent warfarin administrations        No warfarin orders with administrations found. Orders not given:            warfarin placeholder: dosing by pharmacy                   Date   INR    Dose  11/22     2.6             Assessment/Plan  (Goal INR: 2 - 3)  Current INR is within goal range. Patient did not receive dose yet today. Will give home dose of 4 mg tonight and re-check INR in am.    Active problem list reviewed. INR orders are placed. Chart reviewed for pertinent labs, drug/diet interactions, and past doses. Documentation of patient's clinical condition was reviewed. Pharmacy Dosing:  Pharmacy will continue to follow.

## 2022-11-23 NOTE — PROGRESS NOTES
Occupational Therapy  Facility/Department: [facility] MED SURG  Occupational Therapy Initial Assessment    Name: Lolita Solitario  : 1930  MRN: [id number]  Date of Service: 2022    ROSA Sanchez reports patient is medically stable for therapy treatment this date. Chart reviewed prior to treatment and patient is agreeable for therapy. All lines intact and patient positioned comfortably at end of treatment. All patient needs addressed prior to ending therapy session. Pt currently functioning below baseline. Recommend daily inpatient skilled therapy at time of discharge to maximize long term outcomes and prevent re-admission. Please refer to AM-PAC score for current level of function. Discharge Recommendations:  Patient would benefit from continued therapy after discharge  OT Equipment Recommendations  Equipment Needed: Yes  Mobility Devices: ADL Assistive Devices  ADL Assistive Devices: Toileting - 3-in-1 Commode;Reacher;Long-handled Shoe Horn;Long-handled Sponge       Patient Diagnosis(es): The encounter diagnosis was Acute kidney injury ([address] Past Medical History:  has a past medical history of Achilles tendon pain, Anemia, Atrial fibrillation ([address] Cardiomyopathy ([address] Cardiomyopathy ([address] Cataract, CHF (congestive heart failure) ([address] Chronic kidney disease, COPD (chronic obstructive pulmonary disease) ([address] Dental disease, Diabetes mellitus ([address] Diabetic neuropathy ([address] DVT (deep venous thrombosis) ([address] Edema, Entropion, Gout, Hearing loss, Hyperlipidemia, Hypertension, and Ribs, multiple fractures. Past Surgical History:  has a past surgical history that includes hernia repair (); lipoma resection (); Colonoscopy (); Achilles tendon surgery (Left, 's); Tonsillectomy (s); and Eye surgery (Right, 2016).       PER H&P:    Lolita Solitario is a 80 y.o.  male who presents with Fatigue     Patient admitted through the emergency room where he presented with progressive weakness for last 2 days. And says he had been nauseous and vomited once today. Patient says he was not able to eat or drink much weakness continued to worsen after which she was brought to the emergency room patient says he does not understand why he was nauseous. He also says he had poor appetite for several days before that but he does not know why. Is having any fever or chills. Denies any respiratory infection, diarrhea or dysuria. Patient states he has not had a bowel movement for 3 days      Assessment   Performance deficits / Impairments: Decreased functional mobility ; Decreased safe awareness;Decreased balance;Decreased coordination;Decreased ADL status; Decreased cognition;Decreased posture;Decreased endurance;Decreased high-level IADLs;Decreased strength  Assessment: Skilled OT services are indicated for maximizing functional I and safety as able as well as improve overall balance/act megan aand strength to reduce fall risk.   Prognosis: Fair  Decision Making: Medium Complexity  REQUIRES OT FOLLOW-UP: Yes  Activity Tolerance  Activity Tolerance: Patient limited by fatigue;Treatment limited secondary to decreased cognition  Activity Tolerance Comments: poor plus and pt fatigues easily; stand megan 1-1.5 mins with RW        Plan   Occupational Therapy Plan  Times Per Week: 4-5x/week 1x/dayas megan  Current Treatment Recommendations: Strengthening, Balance training, Functional mobility training, Endurance training, Equipment evaluation, education, & procurement, Neuromuscular re-education, Safety education & training, Patient/Caregiver education & training, Self-Care / ADL, Cognitive/Perceptual training, Coordination training     Restrictions  Restrictions/Precautions  Restrictions/Precautions: Fall Risk  Position Activity Restriction  Other position/activity restrictions: easy to chew diet, ALEXANDER, alarms, up with assist, RUE IV    Subjective   General  Chart Reviewed: Yes  Patient assessed for rehabilitation services?: Yes  Family / Caregiver Present: No  Subjective  Subjective: Pt denies pain     Social/Functional History  Social/Functional History  Lives With: Alone  Type of Home: Apartment  Home Layout: Multi-level (pt states he is on the 6th floor at [facility])  Home Access: Elevator, Level entry  Bathroom Shower/Tub: Walk-in shower  Bathroom Toilet: Handicap height (grab bar by the toilet)  Bathroom Equipment: Grab bars in [facility]: [address], [address], [place], [place], [place], [employer] Help From: Family (pt states he has 8 children and 5 are local and supportive)  ADL Assistance: Independent  Homemaking Assistance: Needs assistance (pt states he eats meals in dining [employer]; has asist with all laundry and cleaning)  Homemaking Responsibilities: No  Ambulation Assistance: Independent (with cane)  Transfer Assistance: Independent  Active : Yes (pt states he still drives and son will drive him at times)  Occupation: Retired  Type of Occupation:   Leisure & Hobbies: reading  Additional Comments: Pt states he was dehydrated and legs gave out and he went down in his apt; denies any other falls       Objective           Observation/Palpation  Posture: Fair (with RW)  Observation: fragile skin BUE's/BLE's discolored, RUE IV  Edema: slight in BLE's    Safety Devices  Type of Devices: Call light within reach; Chair alarm in place; Patient at risk for falls; Left in chair;Nurse notified (pt requested BLE's on floor vs stool when seated in chair; pt was edu to weight shift in chair/change positions frequently to avoid skin issues with fair understanding)    Bed Mobility Training  Bed Mobility Training: Yes  Overall Level of Assistance: Moderate assistance;Assist X1;Additional time  Interventions: Verbal cues; Safety awareness training; Tactile cues (MOD cues/tactile assist for initiating movements/proper log roling tech, pursed lip breathing, hand placement on bed rail, pacing, use of BUE's as able to assist with full scoot to EOB and place BLE's on floor and awareness/assist with lines to increase)  Rolling: Moderate assistance;Assist X1  Supine to Sit: Moderate assistance;Assist X1  Sit to Supine:  (N/T and pt agreed to sit up in chair after edu on the benfits of being up OOB as able)  Scooting: Minimum assistance;Assist X1 with increased time     Balance  Sitting:  (CG to SBA)  Standing:  (MOD to MIN with RW)  Transfer Training  Transfer Training: Yes  Overall Level of Assistance: Moderate assistance;Assist X1;Additional time (with RW)  Interventions: Verbal cues;Demonstration;Weight shifting training; Safety awareness training; Tactile cues (MAX-MOD cues/tactile assist and demo for B hand placement pushing from surface seated on/reaching back, pacing, scannig, widening ANGELIC/weight shifting and upright posture, squaring self/AD up to surface prior to sitting/controlled stand to sits and awareness/assist with lines to increase safey/reduce falls)  Sit to Stand: Moderate assistance;Assist X1;Additional time  Stand to Sit: Moderate assistance;Assist X1;Additional time  Bed to Chair: Moderate assistance;Assist X1;Additional time  Toilet Transfer:  (N/T and pt with no needs during session)      Functional mob   Overall Level of Assistance: Minimum assistance; Moderate assistance;Assist X1 (with RW; bed and forward steps and then backing up to chair)  Interventions: Verbal cues; Safety awareness training;Weight shifting training; Tactile cues;Demonstration (MOD cues/tactile assist for upright posture/weight shifting, pacing, looking up and scanning room, RW safety/mgt and staying inside AD/keeping close to body and awareness/assist with lines to increase safety/reduce falls)     AROM: Within functional limits  Strength: Generally decreased, functional (BUE strength grossly 4/5)  Coordination: Generally decreased, functional (slow and uncoordinated B hand opposition)  Tone: Normal  Sensation: Intact (denies any paresthesias)    ADL  Feeding: Setup (of meal tray items)  Grooming: Setup;Minimal assistance (in seated position)  UE Bathing: Setup;Stand by assistance  LE Bathing: Setup;Minimal assistance  UE Dressing: Setup; Moderate assistance (with hosp gown tying/adjustment and proper wear to increase modesty)  LE Dressing: Setup; Moderate assistance (Pt was able to cross BLE's seated EOB to jose B  socks with increased time however needed min assist for adjstment/proper wear; assist with brief)  Toileting: Maximum assistance (with brief)              Vision  Vision: Impaired  Vision Exceptions: Wears glasses at all times (pt denies changes or previous eye sx)  Hearing  Hearing: Exceptions to [facility]  Hearing Exceptions: Hard of hearing/hearing concerns;Bilateral hearing aid  Cognition  Overall Cognitive Status: Exceptions  Arousal/Alertness: Delayed responses to stimuli  Following Commands: Follows multistep commands with increased time; Follows multistep commands consistently  Attention Span: Appears intact  Memory: Decreased short term memory  Safety Judgement: Decreased awareness of need for assistance;Decreased awareness of need for safety  Problem Solving: Assistance required to generate solutions;Assistance required to identify errors made;Assistance required to implement solutions;Assistance required to correct errors made;Decreased awareness of errors  Insights: Decreased awareness of deficits  Initiation: Requires cues for some  Sequencing: Requires cues for some  Orientation  Overall Orientation Status: Within Functional Limits (pt with flat affect)  Orientation Level: Oriented X4  Perception  Overall Perceptual Status: WFL               Education Given To: Patient  Education Provided: Role of Therapy;Plan of Care;Transfer Training;Energy Conservation; Fall Prevention Strategies  Education Provided Comments: pressure relief, safety in function, call light use, pursed lip breathing, recommendations for continued therapy/benefits of being up OOB as able, postural control and weight shifting, proper bed mob tech  Education Method: Demonstration;Verbal  Barriers to Learning: Cognition; Hearing  Education Outcome: Continued education needed                                                                          AM-PAC Score: 16                  Goals  Short Term Goals  Time Frame for Short Term Goals: by discharge, pt to demo  Short Term Goal 1: bed mob tasks with use of rail as needed to CG. Short Term Goal 2: increase in BUE strength by a 1/2 grade to assist with ADL tasks. Short Term Goal 3: UB ADL to set up and LB ADL to CG with use of AD/AE as needed. Short Term Goal 4: ADL transfers and functional mob with AD to CG. Short Term Goal 5: toileting tasks with use of grab bar/AD to CG. Long Term Goals  Long Term Goal 1: Pt to stand with SBA and AD megan > 4 mins as able to reduce falls with functional tasks. Long Term Goal 2: Caregivers/pt to be I with pressure relief, BUE HEP, EC/WS and fall prevention tech as well as DME/AE recommendations with use of handouts. Patient Goals   Patient goals : Pt states he wants to be able to walk better!        Therapy Time   Individual Concurrent Group Co-treatment   Time In 1961         Time Out 0840         Minutes 51=61 mins total          Timed Code Treatment Minutes: [address] OT

## 2022-11-23 NOTE — CONSULTS
Nephrology Consult Note    Reason for Consult: Acute kidney injury and hyperkalemia  Requesting Physician: Dr. Sy Dickens    Chief Complaint: Weakness  History Obtained From:  patient    History of Present Illness: This is a 80 y.o. male who presents with lower extremity weakness and falling down. The patient denies chest pain or shortness of breath, no loss of consciousness, the patient has been having diarrhea twice a day for the past 2 days, he states he was trying to keep up with fluids. Currently the patient is on room air with no shortness of breath, his blood pressure is a stable since admission, he is afebrile. The patient is known to have chronic kidney disease stage III under the care of Dr. Maral More with a baseline creatinine of 1.9 mg/dL, his creatinine on presentation was 2.3 better today with IV fluids, his potassium is still 3 with normal magnesium. The patient is known to have A. fib, anemia, history of GI bleeds, history of DVT PE    Past Medical History:        Diagnosis Date    Achilles tendon pain 1980's    Repair, Had DVT post surgery    Anemia     Atrial fibrillation (HCC)     Cardiomyopathy ([address]     Cardiomyopathy ([address]     Cataract     CHF (congestive heart failure) ([address]     Chronic kidney disease     Stage 3    COPD (chronic obstructive pulmonary disease) ([address]     Dental disease     Diabetes mellitus ([address]     Type 2     Diabetic neuropathy ([address]     Bilateral Lower legs distal to knees    DVT (deep venous thrombosis) ([address] 1980's    With PE     Edema     Bilateral Legs    Entropion 06/2016    Right Lower Lid    Gout     Hearing loss     Hyperlipidemia     Hypertension     Ribs, multiple fractures     Per [facility] records       Past Surgical History:        Procedure Laterality Date    ACHILLES TENDON SURGERY Left 1980's    Had DVT post surgery    COLONOSCOPY  1990's    EYE SURGERY Right 07/12/2016    Repair Entropion eye, Rt lower lid.  Surgeon Salvador Lopez at [facility]    HERNIA REPAIR  1980     2 Lt inguinal    LIPOMA RESECTION  1980's    TONSILLECTOMY  1940's       Current Medications:    warfarin (COUMADIN) tablet 4 mg, Once  potassium chloride (KLOR-CON M) extended release tablet 40 mEq, Once  allopurinol (ZYLOPRIM) tablet 100 mg, Daily  carvedilol (COREG) tablet 6.25 mg, BID WC  cilostazol (PLETAL) tablet 100 mg, Daily  pravastatin (PRAVACHOL) tablet 20 mg, Nightly  isosorbide mononitrate (IMDUR) extended release tablet 30 mg, Daily  0.9 % sodium chloride infusion, Continuous  sodium chloride flush 0.9 % injection 5-40 mL, 2 times per day  sodium chloride flush 0.9 % injection 5-40 mL, PRN  0.9 % sodium chloride infusion, PRN  ondansetron (ZOFRAN-ODT) disintegrating tablet 4 mg, Q8H PRN   Or  ondansetron (ZOFRAN) injection 4 mg, Q6H PRN  acetaminophen (TYLENOL) tablet 650 mg, Q6H PRN   Or  acetaminophen (TYLENOL) suppository 650 mg, Q6H PRN  sodium polystyrene (KAYEXALATE) 15 GM/60ML suspension 15 g, Once PRN  doxazosin (CARDURA) tablet 4 mg, Nightly  glucose chewable tablet 16 g, PRN  dextrose bolus 10% 125 mL, PRN   Or  dextrose bolus 10% 250 mL, PRN  glucagon (rDNA) injection 1 mg, PRN  dextrose 10 % infusion, Continuous PRN  insulin lispro (HUMALOG) injection vial 0-8 Units, TID WC  insulin lispro (HUMALOG) injection vial 0-4 Units, Nightly  warfarin placeholder: dosing by pharmacy, RX Placeholder        Allergies:  Bumetanide, Cephalexin, Omeprazole, and Amoxicillin    Social History:   Social History     Socioeconomic History    Marital status:      Spouse name: Not on file    Number of children: Not on file    Years of education: Not on file    Highest education level: Not on file   Occupational History    Not on file   Tobacco Use    Smoking status: Never    Smokeless tobacco: Never   Vaping Use    Vaping Use: Never used   Substance and Sexual Activity    Alcohol use:  Yes     Alcohol/week: 7.0 standard drinks     Types: 1 Cans of beer, 6 Shots of liquor per week    Drug use: No    Sexual activity: Never   Other Topics Concern    Not on file   Social History Narrative    Not on file     Social Determinants of Health     Financial Resource Strain: Not on file   Food Insecurity: Not on file   Transportation Needs: Not on file   Physical Activity: Not on file   Stress: Not on file   Social Connections: Not on file   Intimate Partner Violence: Not on file   Housing Stability: Not on file       Family History:   History reviewed. No pertinent family history. Review of Systems:    Pertinent positives stated above in HPI. All other systems were reviewed and were negative.   No fever, no chills  No shortness of breath, no cough no hemoptysis  No chest pain, no palpitation, no syncope, no lower extremity edema  No abdominal pain, no nausea or vomiting, positive for diarrhea, no melena or hematochezia  No focal motor deficit, no headache, no blurred vision, no tremors, no seizures  No bleeding, no skin rash, no petechia  No joint pain or swelling  Objective:  CURRENT TEMPERATURE:  Temp: 97.8 °F (36.6 °C)  MAXIMUM TEMPERATURE OVER 24HRS:  Temp (24hrs), Av.9 °F (36.6 °C), Min:97.4 °F (36.3 °C), Max:98.9 °F (37.2 °C)    CURRENT RESPIRATORY RATE:  Resp: 16  CURRENT PULSE:  Heart Rate: 63  CURRENT BLOOD PRESSURE:  BP: (!) 129/47  24HR BLOOD PRESSURE RANGE:  Systolic (60AQO), KYR:984 , Min:103 , IWP:971   ; Diastolic (51BMX), FJI:43, Min:47, Max:65    24HR INTAKE/OUTPUT:    Intake/Output Summary (Last 24 hours) at 2022 0905  Last data filed at 2022 0448  Gross per 24 hour   Intake 300 ml   Output 550 ml   Net -250 ml       Physical Exam:  Awake, alert, in no acute distress  Skin: warm and dry, no rash or erythema  Eyes: conjunctivae normal and sclera anicteric  ENT: no thrush no pharyngeal congestion  Neck: {GENERAL NECK [id number]:[id number][de-identified] carotids without bruits bilaterally JVD: None Lymphadenopathty:none   Pulmonary: clear to auscultation bilaterally- is noted. There are mild nonspecific foci of periventricular and subcortical cerebral white matter hypodensity, most likely representing chronic microangiopathic disease in this age group. The brain parenchyma is otherwise normal. The cerebellar tonsils are in normal position. The ventricles, sulci, and cisterns are mildly prominent suggestive of mild generalized volume loss. The globes and orbits are within normal limits. Complete opacification of right maxillary sinus, mild mucosal thickening of the left maxillary sinus. Moderate mucosal thickening of the ethmoidal air cells. Polypoid mucosal thickening within the right side of the nasopharynx. The visualized extracranial structures including paranasal sinuses and mastoid air cells are otherwise unremarkable. No fracture is identified. No evidence for acute intracranial hemorrhage, territorial infarction or intracranial mass lesion. Mild chronic microangiopathic ischemic disease. Mild generalized volume loss. Complete opacification of right maxillary sinus, mild mucosal thickening of the left maxillary sinus. Moderate mucosal thickening of the ethmoidal air cells. Polypoid mucosal thickening within the right side of the nasopharynx. XR CHEST PORTABLE    Result Date: 11/22/2022  EXAMINATION: ONE XRAY VIEW OF THE CHEST 11/22/2022 3:21 pm COMPARISON: 12/09/2018 HISTORY: ORDERING SYSTEM PROVIDED HISTORY: weak TECHNOLOGIST PROVIDED HISTORY: weak Reason for Exam: fatigue Additional signs and symptoms: fatigue and weak FINDINGS: Stable cardiomegaly with calcific plaque of the thoracic aorta. No signs of pulmonary vascular congestion. Aside from mild chronic appearing changes lungs appear grossly clear. Minimal subsegmental atelectasis/scarring left lung base. No new focal lung consolidation. No large pleural effusions. Bones are osteopenic with degenerative changes. Old left rib fractures.      No acute cardiopulmonary process suspected Assessment:  1. DELROY on CKD stage III with a baseline creatinine of 1.9 mg/dL, under the care of Dr. Garcia Dense secondary to prerenal azotemia, nonoliguric, improving  2. Hypokalemia, GI loss  3. Diarrhea  4. Presented for fall  5. A. fib  6. History of DVT/PE  7. Anemia  8. History of GI bleed       Plan:  1. Continue IV fluids   2. Replace potassium  3. Avoid nephrotoxins  4. Strict I's and O's    Thank you for the consultation. Please do not hesitate to call with questions.     Electronically signed by Claudia Moreno MD on 11/23/2022 at 9:05 AM

## 2022-11-23 NOTE — PROGRESS NOTES
Pt admitted to room 2012 from ER. Oriented to room and call light/tv controls. Bed in lowest position, wheels locked, 2/4 side rails up  Call light in reach, room free of clutter, adequate lighting provided. Admission database, vital signs and assessment as charted. Patient has home medication list with him.  RN updated epic per list.

## 2022-11-23 NOTE — PROGRESS NOTES
Physical Therapy  Facility/Department: [facility]  Physical Therapy Initial Assessment    Name: Samantha Turk  : 1930  MRN: [id number]  Date of Service: 2022    Discharge Recommendations:  Patient would benefit from continued therapy after discharge     Pt presented to ED on 22 with progressive weakness for last 2 days. And says he had been nauseous and vomited once today. Pt said he was not able to eat or drink much weakness continued to worsen after which she was brought to the emergency room patient says he does not understand why he was nauseous. He also says he had poor appetite for several days before that but he does not know why. Is having any fever or chills. Denies any respiratory infection, diarrhea or dysuria. Patient states he has not had a bowel movement for 3 days    Pt admitted for further medical management of dehydration    RN reports patient is medically stable for therapy treatment this date. Chart reviewed prior to treatment and patient is agreeable for therapy. Patient Diagnosis(es): The encounter diagnosis was Acute kidney injury ([address] Past Medical History:  has a past medical history of Achilles tendon pain, Anemia, Atrial fibrillation ([address] Cardiomyopathy ([address] Cardiomyopathy ([address] Cataract, CHF (congestive heart failure) ([address] Chronic kidney disease, COPD (chronic obstructive pulmonary disease) ([address] Dental disease, Diabetes mellitus ([address] Diabetic neuropathy ([address] DVT (deep venous thrombosis) ([address] Edema, Entropion, Gout, Hearing loss, Hyperlipidemia, Hypertension, and Ribs, multiple fractures. Past Surgical History:  has a past surgical history that includes hernia repair (); lipoma resection (s); Colonoscopy (s); Achilles tendon surgery (Left, 's); Tonsillectomy ('s); and Eye surgery (Right, 2016).     Assessment   Body Structures, Functions, Activity Limitations Requiring Skilled Therapeutic Intervention: Decreased functional mobility ; Decreased ADL status; Decreased strength;Decreased safe awareness;Decreased endurance;Decreased balance;Decreased posture  Assessment: Pt with deficits of bed mobility, transfers, ambulation, balance, cognition, posture, safety awareness and endurance this session. With current deficits, Pt at risk for more falls & requires continued PT to maximize independence with functional mobility, balance, safety awareness & activity tolerance to improve overall tolerance of ADL's & reduce fall risk. Pt currently functioning below baseline with AM-PAC mobility score of 12/24. Recommend daily inpatient skilled therapy at time of discharge to maximize long term outcomes and prevent re-admission. Therapy Prognosis: Good  Decision Making: Medium Complexity  Exam: ROM, MMT, functional mobility, activity tolerance, Balance, & [address] [facility] [address] [address] AM-[facility] 6 Clicks Basic Mobility  Clinical Presentation: evolving  Requires PT Follow-Up: Yes  Activity Tolerance  Activity Tolerance: Patient limited by fatigue;Patient limited by endurance     Plan   Physcial Therapy Plan  General Plan: 5-7 times per week  Current Treatment Recommendations: Strengthening, Balance training, Functional mobility training, Transfer training, ADL/Self-care training, Endurance training, Gait training, Home exercise program, Safety education & training, Patient/Caregiver education & training, Neuromuscular re-education  Safety Devices  Type of Devices: Call light within reach, Chair alarm in place, Patient at risk for falls, Left in chair, Nurse notified (pt requested BLE's on floor when seated in chair; pt was edu to weight shift in chair/change positions frequently to avoid skin issues with fair understanding)     Restrictions  Restrictions/Precautions  Restrictions/Precautions: General Precautions, Fall Risk  Position Activity Restriction  Other position/activity restrictions:  Up with assist, easy to chew diet, LAEXANDER diet, ALARMS, DARIA LITTLE     Subjective   General  Chart Reviewed: Yes  Patient assessed for rehabilitation services?: Yes  Additional Pertinent Hx: CMP, CHF, DM, atrial fib, COPD  Response To Previous Treatment: Not applicable  General Comment  Comments: RN okays PT  Subjective  Subjective: Pt agreeable to PT, denies pain         Social/Functional History  Social/Functional History  Lives With: Alone  Type of Home: Apartment  Home Layout: Multi-level (pt is on the 6th floor at [facility])  Home Access: Elevator, Level entry  Bathroom Shower/Tub: Walk-in shower  Bathroom Toilet: Handicap height (grab bar by the toilet)  Bathroom Equipment: Grab bars in shower  Home Equipment: Alert Button, Odin Ponto, Walker, rolling, Wheelchair-manual  Has the patient had two or more falls in the past year or any fall with injury in the past year?: Yes  Receives Help From: Family (pt states he has 8 children and 5 are local and supportive)  ADL Assistance: Independent  Homemaking Assistance: Needs assistance (pt states he eats meals in dining [place]right; has asist with all laundry and cleaning)  Homemaking Responsibilities: No (Pt states he was dehydrated and legs gave out and he went down in his apt; denies any other falls but after writer asked about scrape at left knee, Pt admitted he had another fall a few months ago as well)  Ambulation Assistance: Independent (with cane)  Transfer Assistance: Independent  Active : Yes (pt states he still drives and son will drive him at times)  Occupation: Retired  Type of Occupation:   Leisure & Hobbies: reading  Additional Comments: Pt states he was dehydrated and legs gave out and he went down in his apt; denies any other falls  Vision/Hearing  Vision  Vision: Impaired  Vision Exceptions: Wears glasses at all times (pt denies changes or previous eye sx)  Hearing  Hearing: Exceptions to [facility]  Hearing Exceptions: Hard of hearing/hearing concerns;Bilateral hearing aid    Cognition   Orientation  Overall Orientation Status: Within Functional Limits (pt with flat affect)  Orientation Level: Oriented X4  Cognition  Overall Cognitive Status: Exceptions  Arousal/Alertness: Delayed responses to stimuli  Following Commands: Follows multistep commands with increased time; Follows multistep commands consistently  Attention Span: Appears intact  Memory: Decreased short term memory  Safety Judgement: Decreased awareness of need for assistance;Decreased awareness of need for safety  Problem Solving: Assistance required to generate solutions;Assistance required to identify errors made;Assistance required to implement solutions;Assistance required to correct errors made;Decreased awareness of errors  Insights: Decreased awareness of deficits  Initiation: Requires cues for some  Sequencing: Requires cues for some     Objective   Heart Rate: 63  Heart Rate Source: Monitor  BP: (!) 129/47  BP Location: Right upper arm  Patient Position: Semi fowlers  MAP (Calculated): 74  Resp: 16  SpO2: 94 %  O2 Device: None (Room air)     Observation/Palpation  Posture: Fair (with RW)  Observation: RUE IV. Pt has very fragile skin with multiple scattered bruising of BUE's & fresh skin tear covered by dressing at  R forearm, BLE's are darkended with vascular changes of lower legs  Edema: slight in BLE's  Gross Assessment  Sensation: Intact (pt denies paresthesias)     AROM RLE (degrees)  RLE AROM: WFL  AROM LLE (degrees)  LLE AROM : WFL  AROM RUE (degrees)  RUE General AROM: See OT assessment  AROM LUE (degrees)  LUE General AROM: See OT assessment  Strength RLE  Comment: 4/5 hip  Strength LLE  Comment: 4/5 hip  Strength RUE  Comment: See OT assessment  Strength LUE  Comment: See OT assessment          Bed mobility  Rolling to Right: Minimal assistance  Supine to Sit: Moderate assistance  Scooting:  Moderate assistance  Bed Mobility Comments: MOD cues/tactile assist for initiating movements/proper log roling tech, pursed lip breathing, hand placement on bed rail & assist with core control to come to seated position, & proper use of UB to scoot completely out to EOB with B foot placement to establish safe sitting balance, pursed lip breathing,  pacing, encouragement, awareness/assist with line mgt,  with increased time needed all to increase safety & reduce fall risk  Transfers  Sit to Stand: Moderate Assistance  Bed to Chair: Moderate assistance  Stand Pivot Transfers: Minimal Assistance  Squat Pivot Transfers: Minimal Assistance  Lateral Transfers: Minimal Assistance  Comment: MOD VC + tactile assist on correct use of upper body for safe sit/stand, nose over toes tech, upright posture, pacing + to back all way back to surface with walker CLOSE until he feels touch behind legs, & to ensure he reaches with UB support to arms of chair  Ambulation  Surface: Level tile  Device: Rolling Walker  Assistance: Minimal assistance  Quality of Gait: step to pattern, MOD cues to keep walker close at all times & to amb inside base of walker & upright posture for safety/scanning + Max A to manage lines  Gait Deviations: Decreased step length;Decreased step height;Slow Dasha;Decreased head and trunk rotation  Distance: 20ftx2  Comments: Pt amb to BR for toileting, MOD Assistance to sit to toilet. Pt stood with MOD Assistance, stood 2 minutes for pericare & to pull up brief with MAX Assist, amb 3ft to sink for hand hygiene x 2 minutes then ambulated 20 more ft with R/walker & sat to EOB with MOD Assistance.  Pt assisted donning of clean gown & to refit brief & then stood with MOD Assist & amb 10 ft to chair, sat to chair with MOD Assist     Balance  Posture: Fair  Sitting - Static: Good  Sitting - Dynamic: Fair;+  Standing - Static: Fair (R/W)  Standing - Dynamic: Fair;- (R/W)  Single Leg Stance R Le  Single Leg Stance L Le  Comments: pt demonstrated impaired core trunk control in iunsupported sitting with posterior LOB, needed MOD cues & ModA to maintain static seated position w/o LOB. Pt demonstrated posterior LOB upon standing, needed MIN cues & Bert to bring wt forward to establish safe COG  Exercise Treatment:   Pt completed lateral WS seated & completed sit to stands x 5 to promote mobility and functional pre gait activities & completed static standing weight shifts & picking feet up off floor with UB support at R/walker to improve core strength & stability  Pressure Relief Exercises: WS seated & glut sets, 10 reps  Static Sitting Balance Exercises: seated EOB with Zia foot placement x 5 minutes with Bert  Postural Correction Exercises: upright posture  Breathing Techniques: pursed lip breathing            All lines intact, call light within reach, and patient positioned comfortably at end of treatment. All patient needs addressed prior to ending therapy session. AM-PAC Score  AM-PAC Inpatient Mobility Raw Score : 12 (11/23/22 1446)  AM-PAC Inpatient T-Scale Score : 35.33 (11/23/22 1446)  Mobility Inpatient CMS 0-100% Score: 68.66 (11/23/22 1446)  Mobility Inpatient CMS G-Code Modifier : CL (11/23/22 1446)          Goals  Short Term Goals  Time Frame for Short Term Goals: 12 visits  Short Term Goal 1: Inc bed-mobility & transfers to independent to enable pt to safely get in/OOB & chair to return to PLOF & decrease risk for falls  Short Term Goal 2: Inc gait to amb 200ft or > indep w/ RW to enable pt to return to previous level of independence & able to demonstrate indep/ safe use of RW in functional activities including approaching surfaces and turning to sit  Short Term Goal 3:  Inc strength to [facility] & seated & standing balance(with device) to good to facilitate pt independence for performance of ADL's & functional mobility, & reduce fall risk  Short Term Goal 4: Pt able to tolerate 30-40 min of activity to include 15-20 reps of ex, NMR & functional mobility with device to facilitate activity tolerance to [facility]  Short Term Goal 5: Ed pt on home ex's, safety,

## 2022-11-23 NOTE — PROGRESS NOTES
Comprehensive Nutrition Assessment    Type and Reason for Visit:  Positive Nutrition Screen (Unplanned weight loss and decreased appetite)    Nutrition Recommendations/Plan:   Continue ADULT DIET; Easy to Chew; Low Fat/Low Chol/High Fiber/ALEXANDER  Start [employer] 2x/day  Monitor p.o intakes and labs     Malnutrition Assessment:  Malnutrition Status:  Severe malnutrition (11/23/22 5178)    Context:  Acute Illness     Findings of the 6 clinical characteristics of malnutrition:  Energy Intake:  50% or less of estimated energy requirements for 5 or more days  Weight Loss:  Greater than 7.5% over 3 months     Body Fat Loss:  Unable to assess     Muscle Mass Loss:  Unable to assess    Fluid Accumulation:  No significant fluid accumulation Extremities   Strength:  Not Performed    Nutrition Assessment:    Patient admission is related to dehydration for diarrhea for two days prior to admission and acute kidney injury. Patient reports losing appetite for a couple of weeks. Patient reports usual body weight is 190 lbs. Patient has lost 12.6% in 3 months. Patient reports eating small meals. Patient ate half a pancake and a few sausages for breakfast and soup and jello for lunch. Patient did not want to try Ensure Enlive. Will order Magic Cup 2x/day. Monitor p.o intakes, diet tolerance and labs. Nutrition Related Findings:    No edema. Nausea Wound Type: None       Current Nutrition Intake & Therapies:    Average Meal Intake: 26-50%, 51-75%     ADULT DIET; Easy to Chew; Low Fat/Low Chol/High Fiber/ALEXANDER    Anthropometric Measures:  Height: 5' 11\" (180.3 cm)  Ideal Body Weight (IBW): 172 lbs (78 kg)       Current Body Weight: 166 lb (75.3 kg), 96.5 % IBW. Weight Source:  Other (Comment) (actual)  Current BMI (kg/m2): 23.2  Usual Body Weight: 190 lb (86.2 kg)  % Weight Change (Calculated): -12.6                    BMI Categories: Normal Weight (BMI 22.0 to 24.9) age over 72    Estimated Daily Nutrient Needs:  Energy Requirements Based On: Kcal/kg  Weight Used for Energy Requirements: Current  Energy (kcal/day): 1885 kcal (25 kcal/kg)  Weight Used for Protein Requirements: Ideal  Protein (g/day):  gm of protein (1.2-1.3 gm/kg)         Nutrition Diagnosis:   Severe malnutrition related to inadequate protein-energy intake as evidenced by weight loss, poor intake prior to admission, intake 26-50%, intake 0-25%, weight loss 7.5% in 3 months    Nutrition Interventions:   Food and/or Nutrient Delivery: Continue Current Diet, Start Oral Nutrition Supplement  Nutrition Education/Counseling: Education not indicated  Coordination of Nutrition Care: Continue to monitor while inpatient       Goals:     Goals: PO intake 75% or greater       Nutrition Monitoring and Evaluation:      Food/Nutrient Intake Outcomes: Food and Nutrient Intake, Supplement Intake  Physical Signs/Symptoms Outcomes: Biochemical Data, Fluid Status or Edema, Skin, Weight    Discharge Planning:    Continue current diet, Continue Oral Nutrition Supplement         Eriberto Kimberly  MFN, RDN, LDN  Lead Clinical Dietitian  RD Office Phone [phone]

## 2022-11-24 LAB
ALBUMIN SERPL-MCNC: 3.6 G/DL (ref 3.5–5.2)
ANION GAP SERPL CALCULATED.3IONS-SCNC: 11 MMOL/L (ref 9–17)
BUN BLDV-MCNC: 74 MG/DL (ref 8–23)
BUN/CREAT BLD: 35 (ref 9–20)
CALCIUM SERPL-MCNC: 9.4 MG/DL (ref 8.6–10.4)
CHLORIDE BLD-SCNC: 101 MMOL/L (ref 98–107)
CO2: 31 MMOL/L (ref 20–31)
CREAT SERPL-MCNC: 2.12 MG/DL (ref 0.7–1.2)
GFR SERPL CREATININE-BSD FRML MDRD: 29 ML/MIN/1.73M2
GLUCOSE BLD-MCNC: 173 MG/DL (ref 70–99)
GLUCOSE BLD-MCNC: 177 MG/DL (ref 75–110)
GLUCOSE BLD-MCNC: 224 MG/DL (ref 75–110)
GLUCOSE BLD-MCNC: 305 MG/DL (ref 75–110)
GLUCOSE BLD-MCNC: 331 MG/DL (ref 75–110)
INR BLD: 3.3
PHOSPHORUS: 3 MG/DL (ref 2.5–4.5)
POTASSIUM SERPL-SCNC: 3.1 MMOL/L (ref 3.7–5.3)
PROTHROMBIN TIME: 33.6 SEC (ref 11.5–14.2)
SODIUM BLD-SCNC: 143 MMOL/L (ref 135–144)

## 2022-11-24 PROCEDURE — 6360000002 HC RX W HCPCS: Performed by: FAMILY MEDICINE

## 2022-11-24 PROCEDURE — 82947 ASSAY GLUCOSE BLOOD QUANT: CPT

## 2022-11-24 PROCEDURE — 85610 PROTHROMBIN TIME: CPT

## 2022-11-24 PROCEDURE — 36415 COLL VENOUS BLD VENIPUNCTURE: CPT

## 2022-11-24 PROCEDURE — 1200000000 HC SEMI PRIVATE

## 2022-11-24 PROCEDURE — 97530 THERAPEUTIC ACTIVITIES: CPT

## 2022-11-24 PROCEDURE — 6370000000 HC RX 637 (ALT 250 FOR IP): Performed by: INTERNAL MEDICINE

## 2022-11-24 PROCEDURE — 6370000000 HC RX 637 (ALT 250 FOR IP): Performed by: FAMILY MEDICINE

## 2022-11-24 PROCEDURE — 80069 RENAL FUNCTION PANEL: CPT

## 2022-11-24 PROCEDURE — 2580000003 HC RX 258: Performed by: FAMILY MEDICINE

## 2022-11-24 RX ORDER — POTASSIUM CHLORIDE 20 MEQ/1
40 TABLET, EXTENDED RELEASE ORAL ONCE
Status: DISCONTINUED | OUTPATIENT
Start: 2022-11-24 | End: 2022-11-26 | Stop reason: SDUPTHER

## 2022-11-24 RX ADMIN — ISOSORBIDE MONONITRATE 30 MG: 30 TABLET, EXTENDED RELEASE ORAL at 09:21

## 2022-11-24 RX ADMIN — INSULIN LISPRO 2 UNITS: 100 INJECTION, SOLUTION INTRAVENOUS; SUBCUTANEOUS at 17:33

## 2022-11-24 RX ADMIN — MIRTAZAPINE 7.5 MG: 7.5 TABLET ORAL at 21:28

## 2022-11-24 RX ADMIN — CILOSTAZOL 100 MG: 100 TABLET ORAL at 09:21

## 2022-11-24 RX ADMIN — ALLOPURINOL 100 MG: 100 TABLET ORAL at 09:21

## 2022-11-24 RX ADMIN — POTASSIUM BICARBONATE 40 MEQ: 782 TABLET, EFFERVESCENT ORAL at 09:25

## 2022-11-24 RX ADMIN — CEFTRIAXONE SODIUM 1000 MG: 1 INJECTION, POWDER, FOR SOLUTION INTRAMUSCULAR; INTRAVENOUS at 12:06

## 2022-11-24 RX ADMIN — CARVEDILOL 6.25 MG: 6.25 TABLET, FILM COATED ORAL at 09:21

## 2022-11-24 RX ADMIN — DOXAZOSIN 4 MG: 4 TABLET ORAL at 21:28

## 2022-11-24 RX ADMIN — INSULIN LISPRO 4 UNITS: 100 INJECTION, SOLUTION INTRAVENOUS; SUBCUTANEOUS at 21:28

## 2022-11-24 RX ADMIN — PRAVASTATIN SODIUM 20 MG: 20 TABLET ORAL at 21:28

## 2022-11-24 RX ADMIN — INSULIN LISPRO 6 UNITS: 100 INJECTION, SOLUTION INTRAVENOUS; SUBCUTANEOUS at 12:10

## 2022-11-24 NOTE — PLAN OF CARE
with multidisciplinary team to address chronic and comorbid conditions and prevent exacerbation or deterioration     Problem: Nutrition Deficit:  Goal: Optimize nutritional status  11/23/2022 2305 by Tj Turner RN  Outcome: Progressing  Flowsheets (Taken 11/23/2022 2305)  Nutrient intake appropriate for improving, restoring, or maintaining nutritional needs: Monitor oral intake, labs, and treatment plans     Problem: Neurosensory - Adult  Goal: Achieves maximal functionality and self care  Outcome: Progressing  Flowsheets (Taken 11/23/2022 2305)  Achieves maximal functionality and self care: Encourage and assist patient to increase activity and self care with guidance from physical therapy/occupational therapy     Problem: Skin/Tissue Integrity - Adult  Goal: Skin integrity remains intact  Outcome: Progressing  Flowsheets (Taken 11/23/2022 2305)  Skin Integrity Remains Intact:   Monitor for areas of redness and/or skin breakdown   Assess vascular access sites hourly     Problem: Skin/Tissue Integrity - Adult  Goal: Incisions, wounds, or drain sites healing without S/S of infection  Outcome: Progressing  Flowsheets (Taken 11/23/2022 2305)  Incisions, Wounds, or Drain Sites Healing Without Sign and Symptoms of Infection: TWICE DAILY: Assess and document dressing/incision, wound bed, drain sites and surrounding tissue     Problem: Musculoskeletal - Adult  Goal: Return mobility to safest level of function  Outcome: Progressing  Flowsheets (Taken 11/23/2022 2305)  Return Mobility to Safest Level of Function:   Assess patient stability and activity tolerance for standing, transferring and ambulating with or without assistive devices   Assist with transfers and ambulation using safe patient handling equipment as needed   Ensure adequate protection for wounds/incisions during mobilization   Obtain physical therapy/occupational therapy consults as needed   Instruct patient/family in ordered activity level     Problem: Musculoskeletal - Adult  Goal: Return ADL status to a safe level of function  Outcome: Progressing  Flowsheets (Taken 11/23/2022 2305)  Return ADL Status to a Safe Level of Function:   Administer medication as ordered   Assess activities of daily living deficits and provide assistive devices as needed   Obtain physical therapy/occupational therapy consults as needed   Assist and instruct patient to increase activity and self care as tolerated     Problem: Metabolic/Fluid and Electrolytes - Adult  Goal: Electrolytes maintained within normal limits  Outcome: Progressing  Flowsheets (Taken 11/23/2022 2305)  Electrolytes maintained within normal limits:   Monitor labs and assess patient for signs and symptoms of electrolyte imbalances   Administer electrolyte replacement as ordered   Monitor response to electrolyte replacements, including repeat lab results as appropriate     Problem: Metabolic/Fluid and Electrolytes - Adult  Goal: Hemodynamic stability and optimal renal function maintained  Outcome: Progressing  Flowsheets (Taken 11/23/2022 2305)  Hemodynamic stability and optimal renal function maintained:   Monitor labs and assess for signs and symptoms of volume excess or deficit   Monitor intake, output and patient weight   Monitor urine specific gravity, serum osmolarity and serum sodium as indicated or ordered   Monitor response to interventions for patient's volume status, including labs, urine output, blood pressure (other measures as available)     Problem: Metabolic/Fluid and Electrolytes - Adult  Goal: Glucose maintained within prescribed range  Outcome: Progressing  Flowsheets (Taken 11/23/2022 2305)  Glucose maintained within prescribed range:   Monitor blood glucose as ordered   Assess for signs and symptoms of hyperglycemia and hypoglycemia   Administer ordered medications to maintain glucose within target range

## 2022-11-24 NOTE — PROGRESS NOTES
Occupational Therapy  Facility/Department: [facility] [place]  Rehabilitation Occupational Therapy Daily Treatment Note    Date: 22  Patient Name: Regla Bella       Room:   MRN: [id number]  Account: [de-identified]   : 1930  (80 y.o.) Gender: male        Due to recent hospitalization and medical condition, pt would benefit from additional intermittent skilled therapy at time of discharge. Please refer to the AM-PAC score for current functional status. Past Medical History:  has a past medical history of Achilles tendon pain, Anemia, Atrial fibrillation ([address] Cardiomyopathy ([address] Cardiomyopathy ([address] Cataract, CHF (congestive heart failure) ([address] Chronic kidney disease, COPD (chronic obstructive pulmonary disease) ([address] Dental disease, Diabetes mellitus ([address] Diabetic neuropathy ([address] DVT (deep venous thrombosis) ([address] Edema, Entropion, Gout, Hearing loss, Hyperlipidemia, Hypertension, and Ribs, multiple fractures. Past Surgical History:   has a past surgical history that includes hernia repair (); lipoma resection (); Colonoscopy (); Achilles tendon surgery (Left, ); Tonsillectomy (s); and Eye surgery (Right, 2016). Restrictions  Restrictions/Precautions: General Precautions; Fall Risk;Up as Tolerated  Other position/activity restrictions: Up with assist, easy to chew diet, ALEXANDER diet, ALARMS,  RUE IV  Required Braces or Orthoses?: No    Subjective  Subjective: Pt in bed upon arrival with daughter at bedside. Pt agreeable to therapy. Restrictions/Precautions: General Precautions; Fall Risk;Up as Tolerated             Objective     Cognition  Overall Cognitive Status: WFL  Orientation  Overall Orientation Status: Within Normal Limits   Perception  Overall Perceptual Status: WFL     ADL - no needs at this time             Functional Mobility  Device: Rolling walker  Activity:  (mobility in room and hallway)  Assistance Level: Stand by assist;Contact guard assist  Skilled Clinical Factors: Pt req'd SBA/CGA during mobility with NO LOB noted. Bed Mobility  Overall Assistance Level: Minimal Assistance  Additional Factors: Verbal cues; Head of bed raised; With handrails  Supine to Sit  Assistance Level: Minimal assistance  Skilled Clinical Factors: to bring upper body all the way up to sitting  Scooting  Assistance Level: Stand by assist  Skilled Clinical Factors: to scoot self to EOB  Transfers  Surface: From bed; To chair with arms  Additional Factors: Verbal cues; Hand placement cues  Device: Walker  Sit to Stand  Assistance Level: Stand by assist;Contact guard assist  Stand to Sit  Assistance Level: Stand by assist;Contact guard assist  Skilled Clinical Factors: Min cues for hand placement and overall safety. Neuromuscular Education  Neuromuscular education: Yes  NDT Treatment: Gait ;Sitting;Standing     Assessment  Assessment  Assessment: Pt tolerated session well and is porgressing towards goals. Pt req'd Min A with bed mobility and SBA/CGA with transfers/mobility with use of walker. Pt does present with weakness and decreased endurance but overall did well. Skilled OT indicated to increase safety and IND with all functional tasks to ensure a safe return to [facility]. Activity Tolerance: Patient tolerated treatment well;Patient limited by endurance  Discharge Recommendations: Patient would benefit from continued therapy after discharge  Safety Devices  Safety Devices in place: Yes  Type of devices: Left in chair;Nurse notified;Call light within reach; Chair alarm in place;Gait belt    Patient Education  Education  Education Given To: Patient  Education Provided: Mobility Training;Transfer Training; Safety  Education Provided Comments: Spoke with pt and pt's daughter regarding therapy.  Pt has visiting [facility] 3x/week for 3 hours/day and [facility] to possibly come on opposite days of visiting [facility] so pt is getting assist daily. Daughter and pt will need to discuss with . Education Method: Verbal;Teach Back  Barriers to Learning: None  Education Outcome: Verbalized understanding;Demonstrated understanding    Plan  Occupational Therapy Plan  Times Per Week: 4-5x/week 1x/dayas megan  Current Treatment Recommendations: Strengthening;Balance training;Functional mobility training; Endurance training;Equipment evaluation, education, & procurement; Neuromuscular re-education; Safety education & training;Patient/Caregiver education & training;Self-Care / ADL;Cognitive/Perceptual training;Coordination training    Goals  Patient Goals   Patient goals : Pt states he wants to be able to walk better! Short Term Goals  Time Frame for Short Term Goals: by discharge, pt to demo  Short Term Goal 1: bed mob tasks with use of rail as needed to CG. Short Term Goal 2: increase in BUE strength by a 1/2 grade to assist with ADL tasks. Short Term Goal 3: UB ADL to set up and LB ADL to CG with use of AD/AE as needed. Short Term Goal 4: ADL transfers and functional mob with AD to CG. Short Term Goal 5: toileting tasks with use of grab bar/AD to CG. Long Term Goals  Long Term Goal 1: Pt to stand with SBA and AD megan > 4 mins as able to reduce falls with functional tasks. Long Term Goal 2: Caregivers/pt to be I with pressure relief, BUE HEP, EC/WS and fall prevention tech as well as DME/AE recommendations with use of handouts.     AM-PAC Score        AM-[facility] Inpatient Daily Activity Raw Score: 18 (11/24/22 1210)  AM-PAC Inpatient ADL T-Scale Score : 38.66 (11/24/22 1210)  ADL Inpatient CMS 0-100% Score: 46.65 (11/24/22 1210)  ADL Inpatient CMS G-Code Modifier : CK (11/24/22 1210)      Therapy Time   Individual Concurrent Group Co-treatment   Time In 1145         Time Out 1208         Minutes 911 [facility]

## 2022-11-24 NOTE — PROGRESS NOTES
Reason for Follow up: DELROY. HISTORY:    Feels better. No diarrhea. Appetite is good. Review Of Systems:   Constitutional: No fever, chills, lethargy, weakness or wt loss. Cardiac:  No chest pain, dyspnea, orthopnea or PND. Pulmonary:  No cough, phlegm or wheezing. Abdomen:  No abdominal pain, nausea, vomiting or diarrhea. :   No hematuria, pyuria, dysuria or flank pain. Extremities:  No swelling or joint pains. Scheduled Meds:   cefTRIAXone (ROCEPHIN) IV  1,000 mg IntraVENous Q24H    mirtazapine  7.5 mg Oral Nightly    allopurinol  100 mg Oral Daily    carvedilol  6.25 mg Oral BID WC    cilostazol  100 mg Oral Daily    pravastatin  20 mg Oral Nightly    isosorbide mononitrate  30 mg Oral Daily    sodium chloride flush  5-40 mL IntraVENous 2 times per day    doxazosin  4 mg Oral Nightly    insulin lispro  0-8 Units SubCUTAneous TID WC    insulin lispro  0-4 Units SubCUTAneous Nightly    warfarin placeholder: dosing by pharmacy   Other RX Placeholder   Continuous Infusions:   sodium chloride 50 mL/hr at 11/22/22 2031    sodium chloride      dextrose       PRN Meds:potassium chloride **OR** potassium alternative oral replacement **OR** potassium chloride, magnesium sulfate, sodium chloride flush, sodium chloride, ondansetron **OR** ondansetron, acetaminophen **OR** acetaminophen, glucose, dextrose bolus **OR** dextrose bolus, glucagon (rDNA), dextrose    Allergies   Allergen Reactions    Bumetanide Nausea And Vomiting    Cephalexin Nausea And Vomiting    Omeprazole Nausea And Vomiting    Amoxicillin Diarrhea       Physical Exam:  Blood pressure (!) 134/51, pulse 61, temperature 98.2 °F (36.8 °C), temperature source Oral, resp. rate 16, height 5' 11\" (1.803 m), weight 166 lb (75.3 kg), SpO2 97 %. In: -   Out: 200   In: -   Out: 200 [Urine:200]    General:  Awake, alert, not in distress. Appears to be stated age. HEENT: Atraumatic, normocephalic. Anicteric sclera. Pink and moist oral mucosa.  No carotid bruit. No JVD. Chest: Bilateral air entry, clear to auscultation, no wheezing, rhonchi or rales. Cardiovascular: RRR, S1S2, no murmur, rub or gallop. Has lower extremity edema. Abdomen: Soft, non tender to palpation. Active bowel sounds x 4 quadrants. Musculoskeletal: Active ROM x 4 extremities. No cyanosis or clubbing. Integumentary: Pink, warm and dry. Free from rash or lesions. Skin turgor normal.  CNS: Oriented to person, place and time. Speech clear. Face symmetrical. No tremor.      Data:  CBC:   Lab Results   Component Value Date    WBC 6.3 11/22/2022    HGB 11.2 (L) 11/22/2022    HCT 32.5 (L) 11/22/2022    MCV 90.0 11/22/2022     (L) 11/22/2022     BMP:    Lab Results   Component Value Date     11/24/2022    K 3.1 (L) 11/24/2022     11/24/2022    CO2 31 11/24/2022    BUN 74 (H) 11/24/2022    CREATININE 2.12 (H) 11/24/2022    GLUCOSE 173 (H) 11/24/2022     CMP:   Lab Results   Component Value Date     11/24/2022    K 3.1 (L) 11/24/2022     11/24/2022    CO2 31 11/24/2022    BUN 74 (H) 11/24/2022    CREATININE 2.12 (H) 11/24/2022    GLUCOSE 173 (H) 11/24/2022    CALCIUM 9.4 11/24/2022    PROT 6.5 11/23/2022    LABALBU 3.6 11/24/2022    BILITOT 0.9 11/23/2022    ALKPHOS 90 11/23/2022    AST 11 11/23/2022    ALT <5 (L) 11/23/2022      Hepatic:   Lab Results   Component Value Date    AST 11 11/23/2022    ALT <5 (L) 11/23/2022    BILITOT 0.9 11/23/2022    ALKPHOS 90 11/23/2022     BNP: No results found for: BNP  Lipids: No results found for: CHOL, HDL  INR:   Lab Results   Component Value Date    INR 3.3 11/24/2022     PTH: No results found for: PTH  Phosphorus:    Lab Results   Component Value Date    PHOS 3.0 11/24/2022     Ionized Calcium: No results found for: IONCA  Magnesium:   Lab Results   Component Value Date    MG 2.4 11/23/2022     Albumin:   Lab Results   Component Value Date    LABALBU 3.6 11/24/2022     Last 3 CK, CKMB, Troponin: @LABRCNT(CKTOTAL:3,CKMB:3,TROPONINI:3)       URINE:)No results found for: David Brewer    Radiology:   Reviewed. Assessment:  CKD stage 3B with a baseline creatinine of 1.9 mg/dL, under the care of Dr. Yves Spatz secondary to prerenal azotemia, nonoliguric, Cr is improving  Hypokalemia, GI loss  Diarrhea  S/P fall  A. fib  History of DVT/PE  Anemia  History of GI bleed    Plan:  Continue IV fluids as ordered. Replace potassium. Avoid hypotension, nephrotoxic drugs, Lovenox, Fleets enema and IV contrast exposure. Follow up labs ordered for AM.     Please do not hesitate to call with questions. We will follow with you. Electronically signed by Diaz Mcwilliams MD  on 11/24/2022 at 12:47 PM  [facility].   Ph: [phone]

## 2022-11-24 NOTE — PROGRESS NOTES
Warfarin Dosing - Pharmacy Consult Note  Consulting Provider: Dr Janes Valadez  Indication:  History of  VTE/PE and Atrial Fibrillation  Warfarin Dose prior to admission: 2 mg Mon/Fri and 4 mg all other days of the week   Concurrent anticoagulants/antiplatelets: None  Significant Drug Interactions:  Allopurinol (home med)  Recent Labs     11/22/22  1454 11/22/22  2130 11/23/22  0645 11/24/22  0600   INR  --  2.6 2.6 3.3   HGB 11.2*  --   --   --    *  --   --   --    LABALBU  --   --  3.7 3.6     Recent warfarin administrations                     warfarin (COUMADIN) tablet 4 mg (mg) 4 mg Given 11/23/22 1841    warfarin (COUMADIN) tablet 4 mg (mg) 4 mg Given 11/22/22 2309                   Date   INR    Dose  11/22     2.6        4 mg   11/23     2.6       4 mg  11/24     3.3        Assessment/Plan  (Goal INR: 2 - 3)  INR 3.3 (goal 2-3). No warfarin today. Re-check INR in am.    Active problem list reviewed. INR orders are placed. Chart reviewed for pertinent labs, drug/diet interactions, and past doses. Documentation of patient's clinical condition was reviewed. Pharmacy Dosing:  Pharmacy will continue to follow.

## 2022-11-24 NOTE — PROGRESS NOTES
[facility].,    Adult Hospitalist      Name: Jazmin Metcalf  MRN: [id number]     Acct: [de-identified]  Room: 2012/2012-02    Admit Date: 11/22/2022  2:40 PM  PCP: Loc Campos MD    Primary Problem  Principal Problem:    Dehydration  Active Problems:    Severe malnutrition ([address]  Resolved Problems:    * No resolved hospital problems. *        Assesment:     Acute kidney injury  Acute dehydration/weakness  Coronary artery disease, native vessel  Atrial fibrillation with controlled rate  History of deep vein thrombosis with pulmonary embolism  Cardiomyopathy  Chronic kidney disease stage III  Diabetes mellitus type 2  Diabetic neuropathy  Essential hypertension  Peripheral artery disease  Gastroesophageal reflux disease without esophagitis  Mixed hyperlipidemia  Gout  Bactermeia   MDD        Plan:     Admit to progressive  Monitor vitals closely  Keep SPO2 above 90%  I's and O's  IV fluids  Pain control  Antiemetics as needed  Urinary sodium  Recheck renal function  Resume essential home medication  Avoid nephrotoxic agents  CBC, BMP  Consult nephrology  Accu-Cheks before meals and at bedtime  Insulin sliding scale medium  Hypoglycemia protocol  Rocephiarun  Mertazipine  D/w [facility]  DVT and GI prophylaxis. Chief Complaint:     Chief Complaint   Patient presents with    Fatigue         History of Present Illness:      Jazmin Metcalf is a 80 y.o.  male who presents with Fatigue    Patient admitted through the emergency room where he presented with progressive weakness for last 2 days. And says he had been nauseous and vomited once today. Patient says he was not able to eat or drink much weakness continued to worsen after which she was brought to the emergency room patient says he does not understand why he was nauseous. He also says he had poor appetite for several days before that but he does not know why. Is having any fever or chills. Denies any respiratory infection, diarrhea or dysuria.   Patient states he has not had a bowel movement for 3 days    Patient says his wife passed away in February of this year. He denies any depression at this time    Patient denies any chest pain, dyspnea or orthopnea. Denies headache, photophobia or diplopia. Denies neck pain or back pain. Denies dysuria    11/23- pt was able to sit up and ambulate w assistance. Still not having much appetite. Abx started since positive blood culture. Also noted depressin, tearful, low energy, low motivation. SSRI started. COde status discussed. Pt wishes to be [address]    I have personally reviewed the past medical history, past surgical history, medications, social history, and family history, and summarized in the note. Review of Systems:     All 10 point system is reviewed and negative otherwise mentioned in HPI. Past Medical History:     Past Medical History:   Diagnosis Date    Achilles tendon pain 1980's    Repair, Had DVT post surgery    Anemia     Atrial fibrillation (HCC)     Cardiomyopathy ([address]     Cardiomyopathy ([address]     Cataract     CHF (congestive heart failure) ([facility])     Chronic kidney disease     Stage 3    COPD (chronic obstructive pulmonary disease) ([address]     Dental disease     Diabetes mellitus ([address]     Type 2     Diabetic neuropathy (HCC)     Bilateral Lower legs distal to knees    DVT (deep venous thrombosis) ([address] 1980's    With PE     Edema     Bilateral Legs    Entropion 06/2016    Right Lower Lid    Gout     Hearing loss     Hyperlipidemia     Hypertension     Ribs, multiple fractures     Per [facility] records        Past Surgical History:     Past Surgical History:   Procedure Laterality Date    ACHILLES TENDON SURGERY Left 1980's    Had DVT post surgery    COLONOSCOPY  1990's    EYE SURGERY Right 07/12/2016    Repair Entropion eye, Rt lower lid.  Surgeon Jagjit La at [facility] Surgery [facility]hthal[facility]    HERNIA REPAIR  1980     2 Lt inguinal    LIPOMA RESECTION  1980's    TONSILLECTOMY  1940's Medications Prior to Admission:       Prior to Admission medications    Medication Sig Start Date End Date Taking? Authorizing Provider   isosorbide mononitrate (IMDUR) 60 MG extended release tablet Take 30 mg by mouth daily   Yes Historical Provider, MD   spironolactone (ALDACTONE) 25 MG tablet Take 12.5 mg by mouth daily   Yes Historical Provider, MD   darbepoetin calos-polysorbate (ARANESP) 40 MCG/0.4ML SOSY injection Inject 40 mcg as directed every 30 days   Yes Historical Provider, MD   warfarin (COUMADIN) 2 MG tablet Take 2 mg by mouth Twice a Week Monday AND FRIDAY   Yes Historical Provider, MD   Cholecalciferol (VITAMIN D3) 1.25 MG (30781 UT) TABS Take 1 tablet by mouth every 30 days Last Friday of the month. Yes Historical Provider, MD   allopurinol (ZYLOPRIM) 100 MG tablet Take 100 mg by mouth daily     Historical Provider, MD   warfarin (COUMADIN) 2 MG tablet Take 4 mg by mouth Five times weekly Tuesday, Wednesday, Thursday,Saturday and Sunday. Historical Provider, MD   gabapentin (NEURONTIN) 300 MG capsule Take 300 mg by mouth 2 times daily     Historical Provider, MD   doxazosin (CARDURA) 4 MG tablet Take 4 mg by mouth at bedtime    Historical Provider, MD   carvedilol (COREG) 6.25 MG tablet Take 6.25 mg by mouth 2 times daily     Historical Provider, MD   furosemide (LASIX) 40 MG tablet Take 40 mg by mouth 2 times daily (before meals)    Historical Provider, MD   cilostazol (PLETAL) 100 MG tablet Take 100 mg by mouth daily    Historical Provider, MD   pravastatin (PRAVACHOL) 40 MG tablet Take 20 mg by mouth at bedtime    Historical Provider, MD        Allergies:       Bumetanide, Cephalexin, Omeprazole, and Amoxicillin    Social History:     Tobacco:    reports that he has never smoked. He has never used smokeless tobacco.  Alcohol:      reports current alcohol use of about 7.0 standard drinks per week. Drug Use:  reports no history of drug use. Family History:     History reviewed.  No pertinent family history. Physical Exam:     Vitals:  /66   Pulse 53   Temp 98.2 °F (36.8 °C) (Oral)   Resp 16   Ht 5' 11\" (1.803 m)   Wt 166 lb (75.3 kg)   SpO2 91%   BMI 23.15 kg/m²   Temp (24hrs), Av.9 °F (36.6 °C), Min:97.7 °F (36.5 °C), Max:98.2 °F (36.8 °C)      General appearance - alert, well appearing, and in no acute distress  Mental status - oriented to person, place, and time with normal affect  Head - normocephalic and atraumatic  Eyes - pupils equal and reactive, extraocular eye movements intact, conjunctiva clear  Ears - hearing appears to be intact  Nose - no drainage noted  Mouth - mucous membranes dry  Neck - supple, no carotid bruits, thyroid not palpable  Chest - clear to auscultation, normal effort  Heart - normal rate, regular rhythm, no murmur  Abdomen - soft, nontender, nondistended, bowel sounds present all four quadrants, no masses, hepatomegaly or splenomegaly  Neurological - normal speech, no focal findings or movement disorder noted, cranial nerves II through XII grossly intact  Extremities - peripheral pulses palpable, no pedal edema or calf pain with palpation. There is significant pigmentation. Bilateral lower extremity sensory neuropathy  Skin -bilateral lower extremity pigmentation.         Data:     Labs:    Hematology:  Recent Labs     22  1454 22  2130 22  0645   WBC 6.3  --   --    RBC 3.61*  --   --    HGB 11.2*  --   --    HCT 32.5*  --   --    MCV 90.0  --   --    MCH 31.0  --   --    MCHC 34.5  --   --    RDW 13.9  --   --    *  --   --    MPV 9.4  --   --    INR  --  2.6 2.6       Chemistry:  Recent Labs     22  1454 22  0645   * 140   K 3.1* 3.0*   CL 88* 98   CO2 27 30   GLUCOSE 293* 201*   BUN 94* 86*   CREATININE 2.30* 2.14*   MG  --  2.4   ANIONGAP 19* 12   LABGLOM 26* 28*   CALCIUM 9.9 9.4       Recent Labs     225 22  0627 22  0645 22  1214 22  1622 22  2004 PROT  --   --  6.5  --   --   --    LABALBU  --   --  3.7  --   --   --    AST  --   --  11  --   --   --    ALT  --   --  <5*  --   --   --    ALKPHOS  --   --  90  --   --   --    BILITOT  --   --  0.9  --   --   --    POCGLU 255* 181*  --  294* 249* 260*       Lab Results   Component Value Date    INR 2.6 11/23/2022    INR 2.6 11/22/2022    INR 3.9 12/11/2018    PROTIME 27.7 (H) 11/23/2022    PROTIME 27.4 (H) 11/22/2022    PROTIME 37.9 (H) 12/11/2018       Lab Results   Component Value Date/Time    SPECIAL RT AC,10ML 11/22/2022 05:35 PM     Lab Results   Component Value Date/Time    CULTURE POSITIVE Blood Culture (A) 11/22/2022 05:35 PM    CULTURE  11/22/2022 05:35 PM     DIRECT GRAM STAIN FROM BOTTLE: GRAM POSITIVE COCCI IN CHAINS    CULTURE  11/22/2022 05:35 PM     Streptococcus species (not S. agalactiae (Group B), S. pneumoniae, or S. pyogenes (Group A))  Methodology- Polymerase Chain Reaction (PCR)      CULTURE  11/22/2022 05:35 PM     (NOTE) Direct Gram Stain from bottle and Polymerase Chain Reaction (PCR) results called to and read back by:CESAR MARRERO AT 10:30 PN 11/23/2022       No results found for: POCPH, PHART, PH, POCPCO2, ZBI3CTH, PCO2, POCPO2, PO2ART, PO2, POCHCO3, FBF2CVE, HCO3, NBEA, PBEA, BEART, BE, THGBART, THB, XOC0LFG, AUGC1PCC, P0MGDKWF, O2SAT, FIO2    Radiology:    CT HEAD WO CONTRAST    Result Date: 11/22/2022  No evidence for acute intracranial hemorrhage, territorial infarction or intracranial mass lesion. Mild chronic microangiopathic ischemic disease. Mild generalized volume loss. Complete opacification of right maxillary sinus, mild mucosal thickening of the left maxillary sinus. Moderate mucosal thickening of the ethmoidal air cells. Polypoid mucosal thickening within the right side of the nasopharynx.      XR CHEST PORTABLE    Result Date: 11/22/2022  No acute cardiopulmonary process suspected         All radiological studies reviewed                Code Status:  Full Code    Electronically signed by Saad Pabon MD on 11/24/2022 at 12:24 AM     Copy sent to Dr. Russell Donovan MD    This note was created with the assistance of a speech-recognition program.  Although the intention is to generate a document that actually reflects the content of the visit, no guarantees can be provided that every mistake has been identified and corrected by editing. Note was updated later by me after  physical examination and  completion of the assessment.

## 2022-11-24 NOTE — PLAN OF CARE
Problem: Discharge Planning  Goal: Discharge to home or other facility with appropriate resources  Outcome: Progressing     Problem: Pain  Goal: Verbalizes/displays adequate comfort level or baseline comfort level  Outcome: Progressing     Problem: Safety - Adult  Goal: Free from fall injury  Outcome: Progressing     Problem: ABCDS Injury Assessment  Goal: Absence of physical injury  Outcome: Progressing     Problem: Chronic Conditions and Co-morbidities  Goal: Patient's chronic conditions and co-morbidity symptoms are monitored and maintained or improved  Outcome: Progressing     Problem: Nutrition Deficit:  Goal: Optimize nutritional status  Outcome: Progressing     Problem: Neurosensory - Adult  Goal: Achieves maximal functionality and self care  Outcome: Progressing     Problem: Skin/Tissue Integrity - Adult  Goal: Skin integrity remains intact  Outcome: Progressing  Goal: Incisions, wounds, or drain sites healing without S/S of infection  Outcome: Progressing     Problem: Musculoskeletal - Adult  Goal: Return mobility to safest level of function  Outcome: Progressing  Goal: Return ADL status to a safe level of function  Outcome: Progressing     Problem: Metabolic/Fluid and Electrolytes - Adult  Goal: Electrolytes maintained within normal limits  Outcome: Progressing  Goal: Hemodynamic stability and optimal renal function maintained  Outcome: Progressing  Goal: Glucose maintained within prescribed range  Outcome: Progressing

## 2022-11-25 LAB
ABSOLUTE EOS #: 0.22 K/UL (ref 0–0.44)
ABSOLUTE IMMATURE GRANULOCYTE: 0.05 K/UL (ref 0–0.3)
ABSOLUTE LYMPH #: 1.03 K/UL (ref 1.1–3.7)
ABSOLUTE MONO #: 0.62 K/UL (ref 0.1–1.2)
ALBUMIN SERPL-MCNC: 3.8 G/DL (ref 3.5–5.2)
ANION GAP SERPL CALCULATED.3IONS-SCNC: 10 MMOL/L (ref 9–17)
BASOPHILS # BLD: 0 % (ref 0–2)
BASOPHILS ABSOLUTE: 0.03 K/UL (ref 0–0.2)
BUN BLDV-MCNC: 63 MG/DL (ref 8–23)
BUN/CREAT BLD: 33 (ref 9–20)
CALCIUM SERPL-MCNC: 9.5 MG/DL (ref 8.6–10.4)
CHLORIDE BLD-SCNC: 102 MMOL/L (ref 98–107)
CO2: 31 MMOL/L (ref 20–31)
CREAT SERPL-MCNC: 1.93 MG/DL (ref 0.7–1.2)
EOSINOPHILS RELATIVE PERCENT: 3 % (ref 1–4)
GFR SERPL CREATININE-BSD FRML MDRD: 32 ML/MIN/1.73M2
GLUCOSE BLD-MCNC: 161 MG/DL (ref 75–110)
GLUCOSE BLD-MCNC: 163 MG/DL (ref 75–110)
GLUCOSE BLD-MCNC: 188 MG/DL (ref 70–99)
GLUCOSE BLD-MCNC: 278 MG/DL (ref 75–110)
GLUCOSE BLD-MCNC: 282 MG/DL (ref 75–110)
HCT VFR BLD CALC: 34.1 % (ref 40.7–50.3)
HEMOGLOBIN: 10.8 G/DL (ref 13–17)
IMMATURE GRANULOCYTES: 1 %
INR BLD: 2.9
LYMPHOCYTES # BLD: 15 % (ref 24–43)
MAGNESIUM: 2.3 MG/DL (ref 1.6–2.6)
MCH RBC QN AUTO: 30.8 PG (ref 25.2–33.5)
MCHC RBC AUTO-ENTMCNC: 31.7 G/DL (ref 28.4–34.8)
MCV RBC AUTO: 97.2 FL (ref 82.6–102.9)
MONOCYTES # BLD: 9 % (ref 3–12)
NRBC AUTOMATED: 0 PER 100 WBC
PDW BLD-RTO: 14.6 % (ref 11.8–14.4)
PHOSPHORUS: 3.5 MG/DL (ref 2.5–4.5)
PLATELET # BLD: 99 K/UL (ref 138–453)
PMV BLD AUTO: 9.9 FL (ref 8.1–13.5)
POTASSIUM SERPL-SCNC: 3.5 MMOL/L (ref 3.7–5.3)
PROTHROMBIN TIME: 30.3 SEC (ref 11.5–14.2)
RBC # BLD: 3.51 M/UL (ref 4.21–5.77)
RBC # BLD: ABNORMAL 10*6/UL
SEG NEUTROPHILS: 71 % (ref 36–65)
SEGMENTED NEUTROPHILS ABSOLUTE COUNT: 4.74 K/UL (ref 1.5–8.1)
SODIUM BLD-SCNC: 143 MMOL/L (ref 135–144)
WBC # BLD: 6.7 K/UL (ref 3.5–11.3)

## 2022-11-25 PROCEDURE — 80069 RENAL FUNCTION PANEL: CPT

## 2022-11-25 PROCEDURE — 82947 ASSAY GLUCOSE BLOOD QUANT: CPT

## 2022-11-25 PROCEDURE — 6370000000 HC RX 637 (ALT 250 FOR IP): Performed by: FAMILY MEDICINE

## 2022-11-25 PROCEDURE — 6360000002 HC RX W HCPCS: Performed by: FAMILY MEDICINE

## 2022-11-25 PROCEDURE — 85025 COMPLETE CBC W/AUTO DIFF WBC: CPT

## 2022-11-25 PROCEDURE — 1200000000 HC SEMI PRIVATE

## 2022-11-25 PROCEDURE — 97116 GAIT TRAINING THERAPY: CPT

## 2022-11-25 PROCEDURE — 36415 COLL VENOUS BLD VENIPUNCTURE: CPT

## 2022-11-25 PROCEDURE — 2580000003 HC RX 258: Performed by: FAMILY MEDICINE

## 2022-11-25 PROCEDURE — 97110 THERAPEUTIC EXERCISES: CPT

## 2022-11-25 PROCEDURE — 83735 ASSAY OF MAGNESIUM: CPT

## 2022-11-25 PROCEDURE — 85610 PROTHROMBIN TIME: CPT

## 2022-11-25 PROCEDURE — 97530 THERAPEUTIC ACTIVITIES: CPT | Performed by: NURSE PRACTITIONER

## 2022-11-25 RX ORDER — WARFARIN SODIUM 2 MG/1
2 TABLET ORAL
Status: COMPLETED | OUTPATIENT
Start: 2022-11-25 | End: 2022-11-25

## 2022-11-25 RX ADMIN — INSULIN LISPRO 4 UNITS: 100 INJECTION, SOLUTION INTRAVENOUS; SUBCUTANEOUS at 14:38

## 2022-11-25 RX ADMIN — CARVEDILOL 6.25 MG: 6.25 TABLET, FILM COATED ORAL at 08:21

## 2022-11-25 RX ADMIN — ALLOPURINOL 100 MG: 100 TABLET ORAL at 08:21

## 2022-11-25 RX ADMIN — PRAVASTATIN SODIUM 20 MG: 20 TABLET ORAL at 21:51

## 2022-11-25 RX ADMIN — MIRTAZAPINE 7.5 MG: 7.5 TABLET ORAL at 21:51

## 2022-11-25 RX ADMIN — DOXAZOSIN 4 MG: 4 TABLET ORAL at 21:51

## 2022-11-25 RX ADMIN — CARVEDILOL 6.25 MG: 6.25 TABLET, FILM COATED ORAL at 17:34

## 2022-11-25 RX ADMIN — INSULIN LISPRO 4 UNITS: 100 INJECTION, SOLUTION INTRAVENOUS; SUBCUTANEOUS at 17:34

## 2022-11-25 RX ADMIN — WARFARIN SODIUM 2 MG: 2 TABLET ORAL at 17:34

## 2022-11-25 RX ADMIN — CEFTRIAXONE SODIUM 1000 MG: 1 INJECTION, POWDER, FOR SOLUTION INTRAMUSCULAR; INTRAVENOUS at 14:38

## 2022-11-25 RX ADMIN — CILOSTAZOL 100 MG: 100 TABLET ORAL at 08:20

## 2022-11-25 RX ADMIN — ISOSORBIDE MONONITRATE 30 MG: 30 TABLET, EXTENDED RELEASE ORAL at 08:21

## 2022-11-25 RX ADMIN — SODIUM CHLORIDE, PRESERVATIVE FREE 10 ML: 5 INJECTION INTRAVENOUS at 21:51

## 2022-11-25 NOTE — PLAN OF CARE
Problem: Discharge Planning  Goal: Discharge to home or other facility with appropriate resources  Outcome: Progressing     Problem: Pain  Goal: Verbalizes/displays adequate comfort level or baseline comfort level  Outcome: Progressing     Problem: Chronic Conditions and Co-morbidities  Goal: Patient's chronic conditions and co-morbidity symptoms are monitored and maintained or improved  Outcome: Progressing  Flowsheets (Taken 11/25/2022 0800)  Care Plan - Patient's Chronic Conditions and Co-Morbidity Symptoms are Monitored and Maintained or Improved:   Monitor and assess patient's chronic conditions and comorbid symptoms for stability, deterioration, or improvement   Collaborate with multidisciplinary team to address chronic and comorbid conditions and prevent exacerbation or deterioration   Update acute care plan with appropriate goals if chronic or comorbid symptoms are exacerbated and prevent overall improvement and discharge     Problem: Neurosensory - Adult  Goal: Achieves maximal functionality and self care  Outcome: Progressing  Flowsheets (Taken 11/25/2022 0800)  Achieves maximal functionality and self care:   Monitor swallowing and airway patency with patient fatigue and changes in neurological status   Encourage and assist patient to increase activity and self care with guidance from physical therapy/occupational therapy   Encourage visually impaired, hearing impaired and aphasic patients to use assistive/communication devices     Problem: Skin/Tissue Integrity - Adult  Goal: Skin integrity remains intact  Outcome: Progressing  Flowsheets (Taken 11/25/2022 0800)  Skin Integrity Remains Intact:   Monitor for areas of redness and/or skin breakdown   Assess vascular access sites hourly   Every 4-6 hours minimum: Change oxygen saturation probe site  Goal: Incisions, wounds, or drain sites healing without S/S of infection  Outcome: Progressing  Flowsheets (Taken 11/25/2022 0800)  Incisions, Wounds, or Drain Sites Healing Without Sign and Symptoms of Infection:   TWICE DAILY: Assess and document skin integrity   Implement wound care per orders     Problem: Skin/Tissue Integrity - Adult  Goal: Incisions, wounds, or drain sites healing without S/S of infection  Outcome: Progressing  Flowsheets (Taken 11/25/2022 0800)  Incisions, Wounds, or Drain Sites Healing Without Sign and Symptoms of Infection:   TWICE DAILY: Assess and document skin integrity   Implement wound care per orders

## 2022-11-25 NOTE — PROGRESS NOTES
Physical Therapy  Facility/Department: [facility]  Rehabilitation Physical Therapy Treatment Note    NAME: Terry Deutsch  : 1930 (85 y.o.)  MRN: [id number]  CODE STATUS: DNR-CCA    Date of Service: 22  Assessment: Pt tolerated increased gait distance with no incidents. Pt,however,displays deficits regarding balance, endurance, and safety awareness and would benefit from cont skilled PT services in order to safely return to [facility] as able. Activity Tolerance: Patient tolerated treatment well  Discharge Recommendations: Patient would benefit from continued therapy after discharge    Restrictions:  Restrictions/Precautions: General Precautions; Fall Risk;Up as Tolerated  Position Activity Restriction  Other position/activity restrictions: Up with assist, easy to chew diet, ALEXANDER diet, ALARMS,  RUE IV     SUBJECTIVE  Subjective  Subjective: Pt in bedside chair upon entry. Pt very pleasant and agreeable with PT. Pain: Denies pain. OBJECTIVE  Cognition  Overall Cognitive Status: Exceptions  Arousal/Alertness: Delayed responses to stimuli  Following Commands: Follows multistep commands with increased time; Follows multistep commands consistently  Attention Span: Appears intact  Memory: Decreased recall of precautions;Decreased recall of recent events  Safety Judgement: Decreased awareness of need for assistance;Decreased awareness of need for safety  Problem Solving: Decreased awareness of errors  Insights: Decreased awareness of deficits  Initiation: Requires cues for some  Sequencing: Requires cues for some  Cognition Comment: Slight deficit in recalling recent events. Pt stating he was up late last night watching football. Specifically \"[place] vs [place]\". Iggy Leone played the [employer] last night.   Orientation  Overall Orientation Status: Impaired  Orientation Level: Oriented X4    Functional Mobility  Balance  Standing Balance: Contact guard assistance  Sit to Stand  Assistance Level: Contact guard assist  Skilled Clinical Factors: Cues to push up from seated surface and avoid pulling up on RW. Demos fair steadiness with STS. Stand to Sit  Assistance Level: Contact guard assist  Skilled Clinical Factors: Fair eccentric quad control noted when returning to chair. Pt displays good hand placement and safe technique. Bed To/From Chair  Technique:  (Pt retired in bedside chair at end of session.)      Environmental Mobility  Ambulation  Surface: Level surface  Device: Rolling walker  Distance: ~200ft total  Activity: Within Unit  Activity Comments: Pt denies dizziness and SOB throughout. Additional Factors: Increased time to complete;Set-up; Verbal cues  Assistance Level: Contact guard assist  Gait Deviations: Slow emmanuel;Decreased step length bilateral  Skilled Clinical Factors: Cues for upright posture and scanning environment during ambulation. No LOB noted. PT Exercises  A/AROM Exercises: Pt completed seated ankle pumps, LAQ, hip add/abd orange band q23wjdc, marches x15  Pressure Relief Exercises: WS seated & glut sets, 10 reps  Postural Correction Exercises: upright posture    Goals  Short Term Goals  Time Frame for Short Term Goals: 12 visits  Short Term Goal 1: Inc bed-mobility & transfers to independent to enable pt to safely get in/OOB & chair to return to PLOF & decrease risk for falls  Short Term Goal 2: Inc gait to amb 200ft or > indep w/ RW to enable pt to return to previous level of independence & able to demonstrate indep/ safe use of RW in functional activities including approaching surfaces and turning to sit  Short Term Goal 3:  Inc strength to [facility] & seated & standing balance(with device) to good to facilitate pt independence for performance of ADL's & functional mobility, & reduce fall risk  Short Term Goal 4: Pt able to tolerate 30-40 min of activity to include 15-20 reps of ex, NMR & functional mobility with device to facilitate activity tolerance to [facility]  Short Term Goal 5: Ed pt on home ex's, safety, balance & endurance training, fall prevention, & issue written Pt Ed    PLAN OF CARE/SAFETY  Physcial Therapy Plan  General Plan: 5-7 times per week  Current Treatment Recommendations: Strengthening;Balance training;Functional mobility training;Transfer training;ADL/Self-care training; Endurance training;Gait training;Home exercise program;Safety education & training;Patient/Caregiver education & training;Neuromuscular re-education  Safety Devices  Type of Devices: Left in chair;Call light within reach;Nurse notified;Gait belt; Chair alarm in place    EDUCATION  Education  Education Given To: Patient  Education Provided: Safety; Mobility Training;Transfer Training  Education Method: Verbal  Education Outcome: Verbalized understanding;Continued education needed    [facility] 16    Therapy Time   Individual Concurrent Group Co-treatment   Time In 4790         Time Out 2661         Minutes [address], [place], 11/25/22 at 2:14 PM

## 2022-11-25 NOTE — PROGRESS NOTES
Reason for Follow up: Acute on chronic kidney disease/fluid electrolyte management    Subjective:    Hung Robles was seen and evaluated. He had just finished working with physical therapy and was able to ambulate with assistance and a belt to the bathroom a few times. He currently is not wearing his lower extremity compression device and has residual edema of the markedly improved. He has had no further orthostatic symptoms or near falls but has been assisted with his ambulation. No worsening shortness of breath chest pain palpitations or difficulty voiding.   Currently off diuretics and on IV saline at 50 cc/h    Review Of Systems: Otherwise as noted in HPI       Hematology:       Scheduled Meds:   potassium chloride  40 mEq Oral Once    cefTRIAXone (ROCEPHIN) IV  1,000 mg IntraVENous Q24H    mirtazapine  7.5 mg Oral Nightly    allopurinol  100 mg Oral Daily    carvedilol  6.25 mg Oral BID WC    cilostazol  100 mg Oral Daily    pravastatin  20 mg Oral Nightly    isosorbide mononitrate  30 mg Oral Daily    sodium chloride flush  5-40 mL IntraVENous 2 times per day    doxazosin  4 mg Oral Nightly    insulin lispro  0-8 Units SubCUTAneous TID WC    insulin lispro  0-4 Units SubCUTAneous Nightly    warfarin placeholder: dosing by pharmacy   Other RX Placeholder     Continuous Infusions:   sodium chloride 50 mL/hr at 11/22/22 2031    sodium chloride      dextrose       PRN Meds:potassium chloride **OR** potassium alternative oral replacement **OR** potassium chloride, magnesium sulfate, sodium chloride flush, sodium chloride, ondansetron **OR** ondansetron, acetaminophen **OR** acetaminophen, glucose, dextrose bolus **OR** dextrose bolus, glucagon (rDNA), dextrose    Allergies   Allergen Reactions    Bumetanide Nausea And Vomiting    Cephalexin Nausea And Vomiting    Omeprazole Nausea And Vomiting    Amoxicillin Diarrhea       Physical Exam:    Vitals:    11/24/22 1534 11/24/22 2005 11/25/22 0320 11/25/22 0700   BP: (!) 132/42 (!) 134/55  128/60   Pulse: (!) 48 64  63   Resp: 16 16  20   Temp: 97.6 °F (36.4 °C) 98.8 °F (37.1 °C)  99 °F (37.2 °C)   TempSrc: Oral Oral  Oral   SpO2: 95% 95%  94%   Weight:   170 lb (77.1 kg)    Height:         No intake/output data recorded. PHYSICAL EXAM:  Fernando Retana is awake and alert in no acute distress. He is currently resting on room air. Neck veins are nondistended. He is in a rate controlled atrial fibrillation. His chest is diminished bilaterally but clear. He is a protuberant but nontender abdomen. Chronic lower extremity edema with 1-2+ pretibial edema and stasis dermatitis. No metabolic flap or gross motor deficit.     Data:    CBC:   Lab Results   Component Value Date    WBC 6.7 11/25/2022    HGB 10.8 (L) 11/25/2022    HCT 34.1 (L) 11/25/2022    MCV 97.2 11/25/2022    PLT 99 (L) 11/25/2022     BMP:    Lab Results   Component Value Date     11/24/2022     11/23/2022     (L) 11/22/2022    K 3.1 (L) 11/24/2022    K 3.0 (L) 11/23/2022    K 3.1 (L) 11/22/2022     11/24/2022    CL 98 11/23/2022    CL 88 (L) 11/22/2022    CO2 31 11/24/2022    CO2 30 11/23/2022    CO2 27 11/22/2022    BUN 74 (H) 11/24/2022    BUN 86 (H) 11/23/2022    BUN 94 (HH) 11/22/2022    CREATININE 2.12 (H) 11/24/2022    CREATININE 2.14 (H) 11/23/2022    CREATININE 2.30 (H) 11/22/2022    GLUCOSE 173 (H) 11/24/2022    GLUCOSE 201 (H) 11/23/2022    GLUCOSE 293 (H) 11/22/2022     CMP:   Lab Results   Component Value Date/Time     11/24/2022 06:00 AM    K 3.1 11/24/2022 06:00 AM     11/24/2022 06:00 AM    CO2 31 11/24/2022 06:00 AM    BUN 74 11/24/2022 06:00 AM    CREATININE 2.12 11/24/2022 06:00 AM    GLUCOSE 173 11/24/2022 06:00 AM    CALCIUM 9.4 11/24/2022 06:00 AM    PROT 6.5 11/23/2022 06:45 AM    LABALBU 3.6 11/24/2022 06:00 AM    BILITOT 0.9 11/23/2022 06:45 AM    ALKPHOS 90 11/23/2022 06:45 AM    AST 11 11/23/2022 06:45 AM    ALT <5 11/23/2022 06:45 AM      Hepatic:   Lab Results Component Value Date    AST 11 11/23/2022    ALT <5 (L) 11/23/2022    BILITOT 0.9 11/23/2022    ALKPHOS 90 11/23/2022     BNP: No results found for: BNP  Lipids: No results found for: CHOL, HDL  INR:   Lab Results   Component Value Date    INR 3.3 11/24/2022    INR 2.6 11/23/2022    INR 2.6 11/22/2022     PTH: No results found for: PTH  Phosphorus:    Lab Results   Component Value Date/Time    PHOS 3.0 11/24/2022 06:00 AM     Ionized Calcium: No results found for: IONCA  Magnesium:   Lab Results   Component Value Date/Time    MG 2.4 11/23/2022 06:45 AM     Albumin:   Lab Results   Component Value Date/Time    LABALBU 3.6 11/24/2022 06:00 AM         Radiology:  EXAMINATION:   ONE XRAY VIEW OF THE CHEST       11/22/2022 3:21 pm       COMPARISON:   12/09/2018       HISTORY:   ORDERING SYSTEM PROVIDED HISTORY: weak   TECHNOLOGIST PROVIDED HISTORY:   weak   Reason for Exam: fatigue   Additional signs and symptoms: fatigue and weak       FINDINGS:   Stable cardiomegaly with calcific plaque of the thoracic aorta. No signs of   pulmonary vascular congestion. Aside from mild chronic appearing changes   lungs appear grossly clear. Minimal subsegmental atelectasis/scarring left   lung base. No new focal lung consolidation. No large pleural effusions. Bones are osteopenic with degenerative changes. Old left rib fractures. 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 Overt low grade proteinuria  Sepsis/2 out of 2 blood cultures positive for GPCs in chains: on Rocephin  DM2  HTN  HLD  Gout  CHF  Chronic Afib  Peripheral Neuropathy  Chronic multifactorial LE edema    Plan:  Discontinue IV fluids  ? Infectious disease consult  Continue PT OT and supportive care  Follow up labs. Please do not hesitate to call with questions.     Dr. Dominique Hines M.D.  [phone] [facility]

## 2022-11-25 NOTE — DISCHARGE INSTR - COC
Continuity of Care Form    Patient Name: Omar Ruelas   :  1930  MRN:  [id number]    Admit date:  2022  Discharge date:  22    Code Status Order: DNR-CCA   Advance Directives:     Admitting Physician:  Ki Marcus MD  PCP: Salome Morales MD    Discharging Nurse: Martha Garvin, RN  Discharging Hospital Unit/Room#:   Discharging Unit Phone Number: [phone]    Emergency Contact:   Extended Emergency Contact Information  Primary Emergency Contact: Beauty Bleacher of [address] [place] [address] Phone: [phone]  Relation: Child  Secondary Emergency Contact: Robert Bustamante  "[employer] Phone: [phone]  Relation: Child   needed? No    Past Surgical History:  Past Surgical History:   Procedure Laterality Date    ACHILLES TENDON SURGERY Left     Had DVT post surgery    COLONOSCOPY      EYE SURGERY Right 2016    Repair Entropion eye, Rt lower lid. Surgeon Harish Dickson at [facility]    [place]     2 Lt inguinal    LIPOMA RESECTION      TONSILLECTOMY         Immunization History: There is no immunization history on file for this patient.     Active Problems:  Patient Active Problem List   Diagnosis Code    GI bleed K92.2    E. coli UTI N39.0, B96.20    Atrial fibrillation, chronic (HCC) I48.20    Essential hypertension I10    Anticoagulated on Coumadin Z79.01    E. coli sepsis (HCC) A41.51    Dehydration E86.0    Severe malnutrition ([address] E43       Isolation/Infection:   Isolation            No Isolation          Patient Infection Status       Infection Onset Added Last Indicated Last Indicated By Review Planned Expiration Resolved Resolved By    None active    Resolved    COVID-19 (Rule Out) 22 COVID-19, Rapid (Ordered)   22 Rule-Out Test Resulted            Nurse Assessment:  Last Vital Signs: BP (!) 130/40   Pulse 58   Temp 98.1 °F (36.7 °C) (Oral)   Resp 18   Ht 5' 11\" (1.803 m)   Wt 170 lb (77.1 kg)   SpO2 95%   BMI 23.71 kg/m²     Last documented pain score (0-10 scale): Pain Level: 0  Last Weight:   Wt Readings from Last 1 Encounters:   11/25/22 170 lb (77.1 kg)     Mental Status:  oriented, alert, coherent, logical, thought processes intact, and able to concentrate and follow conversation    IV Access:  - None    Nursing Mobility/ADLs:  Walking   Assisted  Transfer  Assisted  Bathing  Assisted  Dressing  Assisted  Toileting  Independent  Feeding  Independent  Med [address] [place] [address] Delivery   whole    Wound Care Documentation and Therapy:        Elimination:  Continence: Bowel: Yes  Bladder: Yes  Urinary Catheter: None   Colostomy/Ileostomy/Ileal Conduit: No       Date of Last BM: 11/27/2022    Intake/Output Summary (Last 24 hours) at 11/25/2022 1401  Last data filed at 11/25/2022 0948  Gross per 24 hour   Intake 1240.51 ml   Output --   Net 1240.51 ml     No intake/output data recorded. Safety Concerns: At Risk for Falls    Impairments/Disabilities:      Hearing    Nutrition Therapy:  Current Nutrition Therapy:   - Oral Diet:  Carb Control 4 carbs/meal (1800kcals/day) easy to chew    Routes of Feeding: Oral  Liquids: No Restrictions  Daily Fluid Restriction: no  Last Modified Barium Swallow with Video (Video Swallowing Test): not done    Treatments at the Time of Hospital Discharge:   Respiratory Treatments: na  Oxygen Therapy:  is not on home oxygen therapy.   Ventilator:    - No ventilator support    Rehab Therapies: Physical Therapy and Occupational Therapy  Weight Bearing Status/Restrictions: No weight bearing restrictions  Other Medical Equipment (for information only, NOT a DME order):  walker  Other Treatments: na    Patient's personal belongings (please select all that are sent with patient):  None    RN SIGNATURE:  {Esignature:[id number]}    CASE MANAGEMENT/SOCIAL WORK SECTION    Inpatient Status Date: ***    Readmission Risk Assessment Score:  Readmission Risk              Risk of Unplanned Readmission:  19           Discharging to Facility/ Agency   Name: [facility]  Address: [address] [facility] [address]  Phone:[phone]  Fax:[phone]      / signature: Electronically signed by LIN Garza, ABHILASHW on 11/25/22 at 2:28 PM EST    PHYSICIAN SECTION    Prognosis: Good    Condition at Discharge: Stable    Rehab Potential (if transferring to Rehab): Fair    Recommended Labs or Other Treatments After Discharge: Diagnosis:    Skilled Nursing per hospital recommendation ( Monitor Vitals,pain, Mental status, daily weight Blood sugar monitoring TIDAC.) Call MD if Blood sugar<60 or > 350,Fall precaution, wound care boogie catheter removal as per Nursing home attending)  Skilled OT  Physical Therapy  Daily Labs: cbc,bmp q weekly  Monitor INR Daily if pt on coumadin   Coumadin management per SNF admitting physician unless otherwise specified. Oxygen 2L/minute   Blood sugar accucheck TIDAC  Daily Pulse oxymetry  Continue CPAP/BIPAP if pt wearing at home. Catheter/drain care per surgery  If pt on Tube feeding- please continue per hospital orders. Physician Certification: I certify the above information and transfer of Jazmin Metcalf  is necessary for the continuing treatment of the diagnosis listed and that he requires [facility] for less 30 days.      Update Admission H&P: No change in H&P    PHYSICIAN SIGNATURE:  Electronically signed by Kati Nugent MD on 11/28/22 at 10:23 AM EST

## 2022-11-25 NOTE — CARE COORDINATION
Discussed SNF with patient and agreeable to rehab. #1 choice [facility]  #2 choice [facility]. Catina Vásquez sending referrals. Will need precert.

## 2022-11-25 NOTE — PROGRESS NOTES
Physician Progress Note      PATIENT:               Rhonda Dugan  CSN #:                  [id number]  :                       1930  ADMIT DATE:       2022 2:40 PM  DISCH DATE:  RESPONDING  PROVIDER #:        Diya Roach MD          QUERY TEXT:    Patient noted to have atrial fibrillation and is maintained on Coumadin with a   ALIS?DS?-VASc Score of 8 (Age, CHF, HTN, DM, PAD, Hx DVT/PE.). If possible,   please document in progress notes and discharge summary if you are evaluating   and/or treating any of the following: The medical record reflects the following:  Risk Factors: Afib, ALIS?DS?-VASc score 8  Clinical Indicators:  patient has afib with a ALIS?DS?-VASc score 8 and   maintained on Warafin. Treatment: Ron Dickerson MSN, RN, CCDS, CRCR  Options provided:  -- Secondary hypercoagulable state in a patient with atrial fibrillation  -- Other - I will add my own diagnosis  -- Disagree - Not applicable / Not valid  -- Disagree - Clinically unable to determine / Unknown  -- Refer to Clinical Documentation Reviewer    PROVIDER RESPONSE TEXT:    This patient has secondary hypercoagulable state in a patient with atrial   fibrillation.     Query created by: Curt Townsend on 2022 1:06 PM      Electronically signed by:  Diya Roach MD 2022 3:38 PM

## 2022-11-25 NOTE — PLAN OF CARE
Problem: Discharge Planning  Goal: Discharge to home or other facility with appropriate resources  11/24/2022 1947 by Tj Turner RN  Outcome: Progressing  Flowsheets (Taken 11/24/2022 1910)  Discharge to home or other facility with appropriate resources:   Identify barriers to discharge with patient and caregiver   Arrange for needed discharge resources and transportation as appropriate   Identify discharge learning needs (meds, wound care, etc)     Problem: Pain  Goal: Verbalizes/displays adequate comfort level or baseline comfort level  11/24/2022 1947 by Tj Turner RN  Outcome: Progressing  Flowsheets (Taken 11/23/2022 2305)  Verbalizes/displays adequate comfort level or baseline comfort level:   Encourage patient to monitor pain and request assistance   Assess pain using appropriate pain scale   Administer analgesics based on type and severity of pain and evaluate response   Implement non-pharmacological measures as appropriate and evaluate response     Problem: Safety - Adult  Goal: Free from fall injury  11/24/2022 1947 by Tj Turner RN  Outcome: Progressing  [address] (Taken 11/23/2022 2305)  Free From Fall Injury: Instruct family/caregiver on patient safety     Problem: ABCDS Injury Assessment  Goal: Absence of physical injury  11/24/2022 1947 by Tj Turner RN  Outcome: Progressing  Flowsheets (Taken 11/23/2022 2305)  Absence of Physical Injury: Implement safety measures based on patient assessment     Problem: Chronic Conditions and Co-morbidities  Goal: Patient's chronic conditions and co-morbidity symptoms are monitored and maintained or improved  11/24/2022 1947 by Tj Turner RN  Outcome: Progressing  [address] (Taken 11/24/2022 1910)  Care Plan - Patient's Chronic Conditions and Co-Morbidity Symptoms are Monitored and Maintained or Improved:   Monitor and assess patient's chronic conditions and comorbid symptoms for stability, deterioration, or improvement   Collaborate with multidisciplinary team to address chronic and comorbid conditions and prevent exacerbation or deterioration     Problem: Nutrition Deficit:  Goal: Optimize nutritional status  11/24/2022 1947 by Melania Stone RN  Outcome: Progressing  Flowsheets (Taken 11/23/2022 2305)  Nutrient intake appropriate for improving, restoring, or maintaining nutritional needs: Monitor oral intake, labs, and treatment plans     Problem: Neurosensory - Adult  Goal: Achieves maximal functionality and self care  11/24/2022 1947 by Melania Stone RN  Outcome: Progressing  Flowsheets (Taken 11/24/2022 1910)  Achieves maximal functionality and self care: Encourage and assist patient to increase activity and self care with guidance from physical therapy/occupational therapy     Problem: Skin/Tissue Integrity - Adult  Goal: Skin integrity remains intact  11/24/2022 1947 by Melania Stone RN  Outcome: Progressing  Flowsheets  Taken 11/24/2022 1926  Skin Integrity Remains Intact:   Monitor for areas of redness and/or skin breakdown   Assess vascular access sites hourly  Taken 11/24/2022 1910  Skin Integrity Remains Intact:   Monitor for areas of redness and/or skin breakdown   Assess vascular access sites hourly     Problem: Skin/Tissue Integrity - Adult  Goal: Incisions, wounds, or drain sites healing without S/S of infection  11/24/2022 1947 by Melania Stone RN  Outcome: Progressing  Flowsheets  Taken 11/24/2022 1926  Incisions, Wounds, or Drain Sites Healing Without Sign and Symptoms of Infection: TWICE DAILY: Assess and document dressing/incision, wound bed, drain sites and surrounding tissue  Taken 11/24/2022 1910  Incisions, Wounds, or Drain Sites Healing Without Sign and Symptoms of Infection: TWICE DAILY: Assess and document dressing/incision, wound bed, drain sites and surrounding tissue     Problem: Musculoskeletal - Adult  Goal: Return mobility to safest level of function  11/24/2022 1947 by Melania Stone RN  Outcome: Progressing  Flowsheets (Taken 11/24/2022 1910)  Return Mobility to Safest Level of Function:   Assess patient stability and activity tolerance for standing, transferring and ambulating with or without assistive devices   Assist with transfers and ambulation using safe patient handling equipment as needed   Ensure adequate protection for wounds/incisions during mobilization   Obtain physical therapy/occupational therapy consults as needed   Instruct patient/family in ordered activity level     Problem: Musculoskeletal - Adult  Goal: Return ADL status to a safe level of function  11/24/2022 1947 by Melania Stone RN  Outcome: Progressing  Flowsheets (Taken 11/24/2022 1910)  Return ADL Status to a Safe Level of Function:   Administer medication as ordered   Assess activities of daily living deficits and provide assistive devices as needed   Obtain physical therapy/occupational therapy consults as needed   Assist and instruct patient to increase activity and self care as tolerated     Problem: Metabolic/Fluid and Electrolytes - Adult  Goal: Electrolytes maintained within normal limits  11/24/2022 1947 by Melania Stone RN  Outcome: Progressing  Flowsheets (Taken 11/24/2022 1910)  Electrolytes maintained within normal limits:   Monitor labs and assess patient for signs and symptoms of electrolyte imbalances   Administer electrolyte replacement as ordered   Monitor response to electrolyte replacements, including repeat lab results as appropriate     Problem: Metabolic/Fluid and Electrolytes - Adult  Goal: Hemodynamic stability and optimal renal function maintained  11/24/2022 1947 by Melania Stone RN  Outcome: Progressing  Flowsheets (Taken 11/24/2022 1910)  Hemodynamic stability and optimal renal function maintained:   Monitor labs and assess for signs and symptoms of volume excess or deficit   Monitor intake, output and patient weight   Monitor urine specific gravity, serum osmolarity and serum sodium as indicated or ordered   Monitor response to interventions for patient's volume status, including labs, urine output, blood pressure (other measures as available)     Problem: Metabolic/Fluid and Electrolytes - Adult  Goal: Glucose maintained within prescribed range  11/24/2022 1947 by Malini Boggs RN  Outcome: Progressing  Flowsheets (Taken 11/24/2022 1910)  Glucose maintained within prescribed range:   Monitor blood glucose as ordered   Assess for signs and symptoms of hyperglycemia and hypoglycemia   Administer ordered medications to maintain glucose within target range

## 2022-11-25 NOTE — PROGRESS NOTES
[facility].,    Adult Hospitalist      Name: Samantha Turk  MRN: [id number]     Acct: [de-identified]  Room: 2012/2012-02    Admit Date: 11/22/2022  2:40 PM  PCP: Maximiliano Moralez MD    Primary Problem  Principal Problem:    Dehydration  Active Problems:    Severe malnutrition ([address]  Resolved Problems:    * No resolved hospital problems. *        Assesment:     Acute kidney injury  Acute dehydration/weakness  Coronary artery disease, native vessel  Atrial fibrillation with controlled rate  History of deep vein thrombosis with pulmonary embolism  Cardiomyopathy  Chronic kidney disease stage III  Diabetes mellitus type 2  Diabetic neuropathy  Essential hypertension  Peripheral artery disease  Gastroesophageal reflux disease without esophagitis  Mixed hyperlipidemia  Gout  Bactermeia   MDD        Plan:     Admit to progressive  Monitor vitals closely  Keep SPO2 above 90%  I's and O's  IV fluids  Pain control  Antiemetics as needed  Urinary sodium  Recheck renal function  Resume essential home medication  Avoid nephrotoxic agents  CBC, BMP  Consult nephrology  Accu-Cheks before meals and at bedtime  Insulin sliding scale medium  Hypoglycemia protocol  Rocepmendel  Mertazipine  D/w [facility]  DVT and GI prophylaxis. Chief Complaint:     Chief Complaint   Patient presents with    Fatigue         History of Present Illness:      Samantha Turk is a 80 y.o.  male who presents with Fatigue    Patient admitted through the emergency room where he presented with progressive weakness for last 2 days. And says he had been nauseous and vomited once today. Patient says he was not able to eat or drink much weakness continued to worsen after which she was brought to the emergency room patient says he does not understand why he was nauseous. He also says he had poor appetite for several days before that but he does not know why. Is having any fever or chills. Denies any respiratory infection, diarrhea or dysuria.   Patient states he has not had a bowel movement for 3 days    Patient says his wife passed away in February of this year. He denies any depression at this time    Patient denies any chest pain, dyspnea or orthopnea. Denies headache, photophobia or diplopia. Denies neck pain or back pain. Denies dysuria    11/23- pt was able to sit up and ambulate w assistance. Still not having much appetite. Abx started since positive blood culture. Also noted depressin, tearful, low energy, low motivation. SSRI started. COde status discussed. Pt [facility] to [facility]    11/24- Pt feels more appetite. Sitting up in chair. Son at bedside. Mood better also. Await c/s. 2/2 B Cx positive. Pt now prepared to go to SNF    11/25-patient improving every day. Appetite improving daily. Able to tolerate 70% meals. Ambulating more. Culture sensitivity awaited. Discharge planning to SNF    I have personally reviewed the past medical history, past surgical history, medications, social history, and family history, and summarized in the note. Review of Systems:     All 10 point system is reviewed and negative otherwise mentioned in HPI.       Past Medical History:     Past Medical History:   Diagnosis Date    Achilles tendon pain 1980's    Repair, Had DVT post surgery    Anemia     Atrial fibrillation (HCC)     Cardiomyopathy ([address]     Cardiomyopathy ([address]     Cataract     CHF (congestive heart failure) ([facility])     Chronic kidney disease     Stage 3    COPD (chronic obstructive pulmonary disease) ([address]     Dental disease     Diabetes mellitus ([address]     Type 2     Diabetic neuropathy ([facility])     Bilateral Lower legs distal to knees    DVT (deep venous thrombosis) ([address] 1980's    With PE     Edema     Bilateral Legs    Entropion 06/2016    Right Lower Lid    Gout     Hearing loss     Hyperlipidemia     Hypertension     Ribs, multiple fractures     Per [facility]        Past Surgical History:     Past Surgical History:   Procedure Laterality Date ACHILLES TENDON SURGERY Left 1980's    Had DVT post surgery    COLONOSCOPY  1990's    EYE SURGERY Right 07/12/2016    Repair Entropion eye, Rt lower lid. Surgeon Krishan Jamison at [facility]    [place]     2 Lt inguinal    LIPOMA RESECTION  1980's    TONSILLECTOMY  1940's        Medications Prior to Admission:       Prior to Admission medications    Medication Sig Start Date End Date Taking? Authorizing Provider   isosorbide mononitrate (IMDUR) 60 MG extended release tablet Take 30 mg by mouth daily   Yes Historical Provider, MD   spironolactone (ALDACTONE) 25 MG tablet Take 12.5 mg by mouth daily   Yes Historical Provider, MD   darbepoetin calos-polysorbate (ARANESP) 40 MCG/0.4ML SOSY injection Inject 40 mcg as directed every 30 days   Yes Historical Provider, MD   warfarin (COUMADIN) 2 MG tablet Take 2 mg by mouth Twice a Week Monday AND FRIDAY   Yes Historical Provider, MD   Cholecalciferol (VITAMIN D3) 1.25 MG (61795 UT) TABS Take 1 tablet by mouth every 30 days Last Friday of the month. Yes Historical Provider, MD   allopurinol (ZYLOPRIM) 100 MG tablet Take 100 mg by mouth daily     Historical Provider, MD   warfarin (COUMADIN) 2 MG tablet Take 4 mg by mouth Five times weekly Tuesday, Wednesday, Thursday,Saturday and Sunday.     Historical Provider, MD   gabapentin (NEURONTIN) 300 MG capsule Take 300 mg by mouth 2 times daily     Historical Provider, MD   doxazosin (CARDURA) 4 MG tablet Take 4 mg by mouth at bedtime    Historical Provider, MD   carvedilol (COREG) 6.25 MG tablet Take 6.25 mg by mouth 2 times daily     Historical Provider, MD   furosemide (LASIX) 40 MG tablet Take 40 mg by mouth 2 times daily (before meals)    Historical Provider, MD   cilostazol (PLETAL) 100 MG tablet Take 100 mg by mouth daily    Historical Provider, MD   pravastatin (PRAVACHOL) 40 MG tablet Take 20 mg by mouth at bedtime    Historical Provider, MD        Allergies:       Bumetanide, Cephalexin, Omeprazole, and Amoxicillin    Social History:     Tobacco:    reports that he has never smoked. He has never used smokeless tobacco.  Alcohol:      reports current alcohol use of about 7.0 standard drinks per week. Drug Use:  reports no history of drug use. Family History:     History reviewed. No pertinent family history. Physical Exam:     Vitals:  BP (!) 134/40   Pulse 89   Temp 97.8 °F (36.6 °C) (Oral)   Resp 18   Ht 5' 11\" (1.803 m)   Wt 170 lb (77.1 kg)   SpO2 98%   BMI 23.71 kg/m²   Temp (24hrs), Av.4 °F (36.9 °C), Min:97.8 °F (36.6 °C), Max:99 °F (37.2 °C)      General appearance - alert, well appearing, and in no acute distress  Mental status - oriented to person, place, and time with normal affect  Head - normocephalic and atraumatic  Eyes - pupils equal and reactive, extraocular eye movements intact, conjunctiva clear  Ears - hearing appears to be intact  Nose - no drainage noted  Mouth - mucous membranes dry  Neck - supple, no carotid bruits, thyroid not palpable  Chest - clear to auscultation, normal effort  Heart - normal rate, regular rhythm, no murmur  Abdomen - soft, nontender, nondistended, bowel sounds present all four quadrants, no masses, hepatomegaly or splenomegaly  Neurological - normal speech, no focal findings or movement disorder noted, cranial nerves II through XII grossly intact  Extremities - peripheral pulses palpable, no pedal edema or calf pain with palpation. There is significant pigmentation. Bilateral lower extremity sensory neuropathy  Skin -bilateral lower extremity pigmentation.         Data:     Labs:    Hematology:  Recent Labs     22  0645 22  0600 22  0656   WBC  --   --  6.7   RBC  --   --  3.51*   HGB  --   --  10.8*   HCT  --   --  34.1*   MCV  --   --  97.2   MCH  --   --  30.8   MCHC  --   --  31.7   RDW  --   --  14.6*   PLT  --   --  99*   MPV  --   --  9.9   INR 2.6 3.3 2.9       Chemistry:  Recent Labs 11/23/22  0645 11/24/22  0600 11/25/22  0656    143 143   K 3.0* 3.1* 3.5*   CL 98 101 102   CO2 30 31 31   GLUCOSE 201* 173* 188*   BUN 86* 74* 63*   CREATININE 2.14* 2.12* 1.93*   MG 2.4  --  2.3   ANIONGAP 12 11 10   LABGLOM 28* 29* 32*   CALCIUM 9.4 9.4 9.5   PHOS  --  3.0 3.5       Recent Labs     11/23/22  0645 11/23/22  1214 11/24/22  0600 11/24/22  0616 11/24/22  1125 11/24/22  1633 11/24/22  2033 11/25/22  0628 11/25/22  0656 11/25/22  1112 11/25/22  1652   PROT 6.5  --   --   --   --   --   --   --   --   --   --    LABALBU 3.7  --  3.6  --   --   --   --   --  3.8  --   --    AST 11  --   --   --   --   --   --   --   --   --   --    ALT <5*  --   --   --   --   --   --   --   --   --   --    ALKPHOS 90  --   --   --   --   --   --   --   --   --   --    BILITOT 0.9  --   --   --   --   --   --   --   --   --   --    POCGLU  --    < >  --    < > 331* 224* 305* 161*  --  282* 278*    < > = values in this interval not displayed.          Lab Results   Component Value Date    INR 2.9 11/25/2022    INR 3.3 11/24/2022    INR 2.6 11/23/2022    PROTIME 30.3 (H) 11/25/2022    PROTIME 33.6 (H) 11/24/2022    PROTIME 27.7 (H) 11/23/2022       Lab Results   Component Value Date/Time    SPECIAL RT AC,10ML 11/22/2022 05:35 PM     Lab Results   Component Value Date/Time    CULTURE POSITIVE Blood Culture (A) 11/22/2022 05:35 PM    CULTURE  11/22/2022 05:35 PM     DIRECT GRAM STAIN FROM BOTTLE: GRAM POSITIVE COCCI IN CHAINS    CULTURE  11/22/2022 05:35 PM     Streptococcus species (not S. agalactiae (Group B), S. pneumoniae, or S. pyogenes (Group A))  Methodology- Polymerase Chain Reaction (PCR)      CULTURE VIRIDANS STREPTOCOCCUS GROUP (A) 11/22/2022 05:35 PM    CULTURE STAPHYLOCOCCUS SPECIES, COAGULASE NEGATIVE 11/22/2022 05:35 PM    CULTURE  11/22/2022 05:35 PM     (NOTE) Direct Gram Stain from bottle and Polymerase Chain Reaction (PCR) results called to and read back by:CESAR MARRERO AT 10:30 PN 11/23/2022       No results found for: POCPH, PHART, PH, POCPCO2, GFK4UDV, PCO2, POCPO2, PO2ART, PO2, POCHCO3, XQW8TPY, HCO3, NBEA, PBEA, BEART, BE, THGBART, THB, [id number], UHPJ0ELM, U8KAKATA, O2SAT, FIO2    Radiology:    CT HEAD WO CONTRAST    Result Date: 11/22/2022  No evidence for acute intracranial hemorrhage, territorial infarction or intracranial mass lesion. Mild chronic microangiopathic ischemic disease. Mild generalized volume loss. Complete opacification of right maxillary sinus, mild mucosal thickening of the left maxillary sinus. Moderate mucosal thickening of the ethmoidal air cells. Polypoid mucosal thickening within the right side of the nasopharynx. XR CHEST PORTABLE    Result Date: 11/22/2022  No acute cardiopulmonary process suspected         All radiological studies reviewed                Code Status:  DNR-CCA    Electronically signed by Natacha Roca MD on 11/25/2022 at 5:01 PM     Copy sent to Dr. Michelle Francisco MD    This note was created with the assistance of a speech-recognition program.  Although the intention is to generate a document that actually reflects the content of the visit, no guarantees can be provided that every mistake has been identified and corrected by editing. Note was updated later by me after  physical examination and  completion of the assessment.

## 2022-11-25 NOTE — CONSULTS
Warfarin Dosing - Pharmacy Consult Note  Consulting Provider: Dr Carlos Martel  Indication:  History of  VTE/PE and Atrial Fibrillation  Warfarin Dose prior to admission: 2 mg Mon/Fri and 4 mg all other days of the week   Concurrent anticoagulants/antiplatelets: None  Significant Drug Interactions:  Allopurinol (home med)  Recent Labs     11/23/22  0645 11/24/22  0600 11/25/22  0656   INR 2.6 3.3 2.9   HGB  --   --  10.8*   PLT  --   --  99*   LABALBU 3.7 3.6 3.8     Recent warfarin administrations                     warfarin (COUMADIN) tablet 4 mg (mg) 4 mg Given 11/23/22 1841    warfarin (COUMADIN) tablet 4 mg (mg) 4 mg Given 11/22/22 2309                   Date   INR    Dose  11/22     2.6        4 mg   11/23     2.6        4 mg  11/24     3.3        -0-  11/25     2.9            Assessment/Plan  (Goal INR: 2 - 3)  Will restart with home Friday dose of 2 mg. Re-check INR in am.    Active problem list reviewed. INR orders are placed. Chart reviewed for pertinent labs, drug/diet interactions, and past doses. Documentation of patient's clinical condition was reviewed. Pharmacy Dosing:  Pharmacy will continue to follow.

## 2022-11-25 NOTE — CARE COORDINATION
Social work: [facility] accepted pt started precert. Will need cade, Rx and discharge order for snf. Francisco newman    Social work: Sean Casas HAS RECEIVED [id number] from [facility] and can take if ready over the weekend. Must have cade, Rx and discharge order for snf.  Francisco newman

## 2022-11-25 NOTE — PROGRESS NOTES
[facility].,    Adult Hospitalist      Name: Rodrigo Dobbs  MRN: [id number]     Acct: [de-identified]  Room: 2012/2012-02    Admit Date: 11/22/2022  2:40 PM  PCP: Sosa Amaro MD    Primary Problem  Principal Problem:    Dehydration  Active Problems:    Severe malnutrition ([address]  Resolved Problems:    * No resolved hospital problems. *        Assesment:     Acute kidney injury  Acute dehydration/weakness  Coronary artery disease, native vessel  Atrial fibrillation with controlled rate  History of deep vein thrombosis with pulmonary embolism  Cardiomyopathy  Chronic kidney disease stage III  Diabetes mellitus type 2  Diabetic neuropathy  Essential hypertension  Peripheral artery disease  Gastroesophageal reflux disease without esophagitis  Mixed hyperlipidemia  Gout  Bactermeia   MDD        Plan:     Admit to progressive  Monitor vitals closely  Keep SPO2 above 90%  I's and O's  IV fluids  Pain control  Antiemetics as needed  Urinary sodium  Recheck renal function  Resume essential home medication  Avoid nephrotoxic agents  CBC, BMP  Consult nephrology  Accu-Cheks before meals and at bedtime  Insulin sliding scale medium  Hypoglycemia protocol  Rocephiarun  Mertazipine  D/w ROSA  [facility]  DVT and GI prophylaxis. Chief Complaint:     Chief Complaint   Patient presents with    Fatigue         History of Present Illness:      Rodrigo Dobbs is a 80 y.o.  male who presents with Fatigue    Patient admitted through the emergency room where he presented with progressive weakness for last 2 days. And says he had been nauseous and vomited once today. Patient says he was not able to eat or drink much weakness continued to worsen after which she was brought to the emergency room patient says he does not understand why he was nauseous. He also says he had poor appetite for several days before that but he does not know why. Is having any fever or chills. Denies any respiratory infection, diarrhea or dysuria.   Patient Krishan Jamison at [facility]    HERNIA REPAIR  1980     2 Lt inguinal    LIPOMA RESECTION  1980's    TONSILLECTOMY  1940's        Medications Prior to Admission:       Prior to Admission medications    Medication Sig Start Date End Date Taking? Authorizing Provider   isosorbide mononitrate (IMDUR) 60 MG extended release tablet Take 30 mg by mouth daily   Yes Historical Provider, MD   spironolactone (ALDACTONE) 25 MG tablet Take 12.5 mg by mouth daily   Yes Historical Provider, MD   darbepoetin calos-polysorbate (ARANESP) 40 MCG/0.4ML SOSY injection Inject 40 mcg as directed every 30 days   Yes Historical Provider, MD   warfarin (COUMADIN) 2 MG tablet Take 2 mg by mouth Twice a Week Monday AND FRIDAY   Yes Historical Provider, MD   Cholecalciferol (VITAMIN D3) 1.25 MG (42328 UT) TABS Take 1 tablet by mouth every 30 days Last Friday of the month. Yes Historical Provider, MD   allopurinol (ZYLOPRIM) 100 MG tablet Take 100 mg by mouth daily     Historical Provider, MD   warfarin (COUMADIN) 2 MG tablet Take 4 mg by mouth Five times weekly Tuesday, Wednesday, Thursday,Saturday and Sunday. Historical Provider, MD   gabapentin (NEURONTIN) 300 MG capsule Take 300 mg by mouth 2 times daily     Historical Provider, MD   doxazosin (CARDURA) 4 MG tablet Take 4 mg by mouth at bedtime    Historical Provider, MD   carvedilol (COREG) 6.25 MG tablet Take 6.25 mg by mouth 2 times daily     Historical Provider, MD   furosemide (LASIX) 40 MG tablet Take 40 mg by mouth 2 times daily (before meals)    Historical Provider, MD   cilostazol (PLETAL) 100 MG tablet Take 100 mg by mouth daily    Historical Provider, MD   pravastatin (PRAVACHOL) 40 MG tablet Take 20 mg by mouth at bedtime    Historical Provider, MD        Allergies:       Bumetanide, Cephalexin, Omeprazole, and Amoxicillin    Social History:     Tobacco:    reports that he has never smoked.  He has never used smokeless tobacco.  Alcohol:      reports current alcohol use of about 7.0 standard drinks per week. Drug Use:  reports no history of drug use. Family History:     History reviewed. No pertinent family history. Physical Exam:     Vitals:  BP (!) 134/55   Pulse 64   Temp 98.8 °F (37.1 °C) (Oral)   Resp 16   Ht 5' 11\" (1.803 m)   Wt 166 lb (75.3 kg)   SpO2 95%   BMI 23.15 kg/m²   Temp (24hrs), Av.1 °F (36.7 °C), Min:97.6 °F (36.4 °C), Max:98.8 °F (37.1 °C)      General appearance - alert, well appearing, and in no acute distress  Mental status - oriented to person, place, and time with normal affect  Head - normocephalic and atraumatic  Eyes - pupils equal and reactive, extraocular eye movements intact, conjunctiva clear  Ears - hearing appears to be intact  Nose - no drainage noted  Mouth - mucous membranes dry  Neck - supple, no carotid bruits, thyroid not palpable  Chest - clear to auscultation, normal effort  Heart - normal rate, regular rhythm, no murmur  Abdomen - soft, nontender, nondistended, bowel sounds present all four quadrants, no masses, hepatomegaly or splenomegaly  Neurological - normal speech, no focal findings or movement disorder noted, cranial nerves II through XII grossly intact  Extremities - peripheral pulses palpable, no pedal edema or calf pain with palpation. There is significant pigmentation. Bilateral lower extremity sensory neuropathy  Skin -bilateral lower extremity pigmentation.         Data:     Labs:    Hematology:  Recent Labs     22  1454 22  2130 22  0645 22  0600   WBC 6.3  --   --   --    RBC 3.61*  --   --   --    HGB 11.2*  --   --   --    HCT 32.5*  --   --   --    MCV 90.0  --   --   --    MCH 31.0  --   --   --    MCHC 34.5  --   --   --    RDW 13.9  --   --   --    *  --   --   --    MPV 9.4  --   --   --    INR  --  2.6 2.6 3.3       Chemistry:  Recent Labs     22  1454 22  0645 22  0600   * 140 143   K 3.1* 3.0* 3.1*   CL 88* 98 101   CO2 27 30 31   GLUCOSE 293* 201* 173*   BUN 94* 86* 74*   CREATININE 2.30* 2.14* 2.12*   MG  --  2.4  --    ANIONGAP 19* 12 11   LABGLOM 26* 28* 29*   CALCIUM 9.9 9.4 9.4   PHOS  --   --  3.0       Recent Labs     11/23/22  0645 11/23/22  1214 11/23/22  1622 11/23/22 2004 11/24/22  0600 11/24/22  0616 11/24/22  1125 11/24/22  1633 11/24/22 2033   PROT 6.5  --   --   --   --   --   --   --   --    LABALBU 3.7  --   --   --  3.6  --   --   --   --    AST 11  --   --   --   --   --   --   --   --    ALT <5*  --   --   --   --   --   --   --   --    ALKPHOS 90  --   --   --   --   --   --   --   --    BILITOT 0.9  --   --   --   --   --   --   --   --    POCGLU  --    < > 249* 260*  --  177* 331* 224* 305*    < > = values in this interval not displayed.          Lab Results   Component Value Date    INR 3.3 11/24/2022    INR 2.6 11/23/2022    INR 2.6 11/22/2022    PROTIME 33.6 (H) 11/24/2022    PROTIME 27.7 (H) 11/23/2022    PROTIME 27.4 (H) 11/22/2022       Lab Results   Component Value Date/Time    SPECIAL RT AC,10ML 11/22/2022 05:35 PM     Lab Results   Component Value Date/Time    CULTURE POSITIVE Blood Culture (A) 11/22/2022 05:35 PM    CULTURE  11/22/2022 05:35 PM     DIRECT GRAM STAIN FROM BOTTLE: GRAM POSITIVE COCCI IN CHAINS    CULTURE  11/22/2022 05:35 PM     Streptococcus species (not S. agalactiae (Group B), S. pneumoniae, or S. pyogenes (Group A))  Methodology- Polymerase Chain Reaction (PCR)      CULTURE  11/22/2022 05:35 PM     (NOTE) Direct Gram Stain from bottle and Polymerase Chain Reaction (PCR) results called to and read back by:CESAR MARRERO AT 10:30 PN 11/23/2022       No results found for: POCPH, PHART, PH, POCPCO2, ZCF4HDQ, PCO2, POCPO2, PO2ART, PO2, POCHCO3, PIV5RFJ, HCO3, NBEA, PBEA, BEART, BE, THGBART, THB, KQV5IKB, KMRA0SXU, F4ICUUZC, O2SAT, FIO2    Radiology:    CT HEAD WO CONTRAST    Result Date: 11/22/2022  No evidence for acute intracranial hemorrhage, territorial infarction or intracranial mass lesion. Mild chronic microangiopathic ischemic disease. Mild generalized volume loss. Complete opacification of right maxillary sinus, mild mucosal thickening of the left maxillary sinus. Moderate mucosal thickening of the ethmoidal air cells. Polypoid mucosal thickening within the right side of the nasopharynx. XR CHEST PORTABLE    Result Date: 11/22/2022  No acute cardiopulmonary process suspected         All radiological studies reviewed                Code Status:  DNR-CCA    Electronically signed by Kelley Pereira MD on 11/24/2022 at 11:49 PM     Copy sent to Dr. Herlinda Baltazar MD    This note was created with the assistance of a speech-recognition program.  Although the intention is to generate a document that actually reflects the content of the visit, no guarantees can be provided that every mistake has been identified and corrected by editing. Note was updated later by me after  physical examination and  completion of the assessment.

## 2022-11-25 NOTE — PROGRESS NOTES
Occupational Therapy  Facility/Department: [facility]  Daily Treatment Note  NAME: General Magallon  : 1930  MRN: [id number]    Date of Service: 2022    Discharge Recommendations:  Patient would benefit from continued therapy after discharge  Due to recent hospitalization and medical condition, pt would benefit from additional intermittent skilled therapy at time of discharge. Please refer to the AM-PAC score for current functional status. OT Equipment Recommendations  Equipment Needed: Yes  Mobility Devices: ADL Assistive Devices  ADL Assistive Devices: Toileting - 3-in-1 Commode;Reacher;Long-handled [place];Long-handled Sponge      Patient Diagnosis(es): The encounter diagnosis was Acute kidney injury ([facility] [address] Assessment    [facility]: 18  Assessment: Pt progressing towards goals although remains with overall weakness and decrased endurance. Pt would benefit from continued skilled OT services to address deficits in areas of functional balance, functional reach, ADL completion, safety awareness, ADL transfers, bed mobility, UE strength, and functional mobility, all to ensure safe return home to [facility] and decrease caregiver burden. Activity Tolerance: Patient tolerated treatment well  Discharge Recommendations: Patient would benefit from continued therapy after discharge  Equipment Needed: Yes  Mobility Devices: ADL Assistive Devices      Plan   Occupational Therapy Plan  Times Per Week: 4-5x/week 1x/dayas megan  Current Treatment Recommendations: Strengthening;Balance training;Functional mobility training; Endurance training;Equipment evaluation, education, & procurement; Neuromuscular re-education; Safety education & training;Patient/Caregiver education & training;Self-Care / ADL;Cognitive/Perceptual training;Coordination training     Subjective   Subjective  Subjective: Pt stating he is tired from staying up late last night. Agreeable to therapy.   Orientation  Overall Orientation Status: Impaired  Cognition  Overall Cognitive Status: Exceptions  Memory: Decreased recall of precautions;Decreased recall of recent events  Safety Judgement: Decreased awareness of need for assistance;Decreased awareness of need for safety  Problem Solving: Decreased awareness of errors  Insights: Decreased awareness of deficits  Cognition Comment: Slight deficit in recalling recent events. Pt stating he was up late last night watching football. Specifically \"[place] vs [place]\". Iggy Leone played the [employer] last night. Objective    Vitals  Vitals  O2 Device: None (Room air)  Bed Mobility Training  Bed Mobility Training: Yes  Overall Level of Assistance: Stand-by assistance; Additional time (HOB flat, no bed rails. Pt exiting to R side as he does at home. Pt supine to sit with increased time and difficulty noted. VC for breathing tech, log rolling tech, and rest break seated EOB.)  Supine to Sit: Stand-by assistance; Additional time  Balance  Sitting: Intact  Standing: High guard (No LOB during static or dynamic standing)  Transfer Training  Transfer Training: Yes  Overall Level of Assistance: Contact-guard assistance (From EOB to RW. Pt noted to pull up on front of RW, VC for safety and tech. Poor/fair carryover, pt [place] and may have contributed.)  Interventions: Safety awareness training;Verbal cues  Sit to Stand: Contact-guard assistance  Stand to Sit: Contact-guard assistance  Gait  Overall Level of Assistance: Contact-guard assistance (In room with VC for safety awareness, scanning environment, upright posture. Pt stating bathroom is close to bedroom at home.  No LOB throughout mobility, good pacing although pt still with decreased endurance and requiring rest break after 2x from door to window.)  Interventions: Safety awareness training;Verbal cues  Assistive Device: Walker, rolling     ADL  Additional Comments: Pt declining ADLs at this time, Stated he already completed grooming tasks, no toileting needs, and declining shower/sponge bath. OT Exercises  Postural Correction Exercises: Scap retraction/upright posture, horizontal ab/duction with focus on breathing and opening up chest. [id number] 3x/day progressing to 5x/day as able. Safety Devices  Type of Devices: Call light within reach; Chair alarm in place; Patient at risk for falls; Left in chair;Nurse notified;Gait belt; All fall risk precautions in place     Patient Education  Education Given To: Patient  Education Provided: Role of Therapy;Plan of Care;Transfer Training;Energy Conservation; Fall Prevention Strategies; Home Exercise Program;Precautions  Education Method: Verbal;Demonstration  Barriers to Learning: Cognition; Hearing  Education Outcome: Continued education needed    Goals  Short Term Goals  Time Frame for Short Term Goals: by discharge, pt to demo  Short Term Goal 1: bed mob tasks with use of rail as needed to CG. Short Term Goal 2: increase in BUE strength by a 1/2 grade to assist with ADL tasks. Short Term Goal 3: UB ADL to set up and LB ADL to CG with use of AD/AE as needed. Short Term Goal 4: ADL transfers and functional mob with AD to CG. Short Term Goal 5: toileting tasks with use of grab bar/AD to CG. Long Term Goals  Long Term Goal 1: Pt to stand with SBA and AD megan > 4 mins as able to reduce falls with functional tasks. Long Term Goal 2: Caregivers/pt to be I with pressure relief, BUE HEP, EC/WS and fall prevention tech as well as DME/AE recommendations with use of handouts. Patient Goals   Patient goals : Pt states he wants to be able to walk better! Therapy Time   Individual Concurrent Group Co-treatment   Time In 4955         Time Out 0236         Minutes 32          RN reports patient is medically stable for therapy treatment this date. Chart reviewed prior to treatment and patient is agreeable for therapy. All lines intact and patient positioned comfortably at end of treatment.   All patient needs addressed prior to ending therapy session.            NANCY Kennedy

## 2022-11-26 LAB
ABSOLUTE EOS #: 0.29 K/UL (ref 0–0.44)
ABSOLUTE IMMATURE GRANULOCYTE: 0.04 K/UL (ref 0–0.3)
ABSOLUTE LYMPH #: 0.89 K/UL (ref 1.1–3.7)
ABSOLUTE MONO #: 0.67 K/UL (ref 0.1–1.2)
ALBUMIN SERPL-MCNC: 3.5 G/DL (ref 3.5–5.2)
ANION GAP SERPL CALCULATED.3IONS-SCNC: 12 MMOL/L (ref 9–17)
BASOPHILS # BLD: 1 % (ref 0–2)
BASOPHILS ABSOLUTE: 0.04 K/UL (ref 0–0.2)
BUN BLDV-MCNC: 55 MG/DL (ref 8–23)
BUN/CREAT BLD: 33 (ref 9–20)
CALCIUM SERPL-MCNC: 9.2 MG/DL (ref 8.6–10.4)
CHLORIDE BLD-SCNC: 101 MMOL/L (ref 98–107)
CO2: 29 MMOL/L (ref 20–31)
CREAT SERPL-MCNC: 1.69 MG/DL (ref 0.7–1.2)
CULTURE: ABNORMAL
EOSINOPHILS RELATIVE PERCENT: 4 % (ref 1–4)
GFR SERPL CREATININE-BSD FRML MDRD: 38 ML/MIN/1.73M2
GLUCOSE BLD-MCNC: 172 MG/DL (ref 70–99)
GLUCOSE BLD-MCNC: 178 MG/DL (ref 75–110)
GLUCOSE BLD-MCNC: 181 MG/DL (ref 75–110)
GLUCOSE BLD-MCNC: 215 MG/DL (ref 75–110)
GLUCOSE BLD-MCNC: 308 MG/DL (ref 75–110)
GLUCOSE BLD-MCNC: 341 MG/DL (ref 75–110)
HCT VFR BLD CALC: 31.7 % (ref 40.7–50.3)
HEMOGLOBIN: 10 G/DL (ref 13–17)
IMMATURE GRANULOCYTES: 1 %
INR BLD: 2.5
LYMPHOCYTES # BLD: 13 % (ref 24–43)
Lab: ABNORMAL
Lab: ABNORMAL
MAGNESIUM: 2.3 MG/DL (ref 1.6–2.6)
MCH RBC QN AUTO: 30.5 PG (ref 25.2–33.5)
MCHC RBC AUTO-ENTMCNC: 31.5 G/DL (ref 28.4–34.8)
MCV RBC AUTO: 96.6 FL (ref 82.6–102.9)
MONOCYTES # BLD: 10 % (ref 3–12)
NRBC AUTOMATED: 0 PER 100 WBC
PDW BLD-RTO: 14.6 % (ref 11.8–14.4)
PHOSPHORUS: 3.2 MG/DL (ref 2.5–4.5)
PLATELET # BLD: 95 K/UL (ref 138–453)
PMV BLD AUTO: 9.8 FL (ref 8.1–13.5)
POTASSIUM SERPL-SCNC: 3.1 MMOL/L (ref 3.7–5.3)
PROTHROMBIN TIME: 27.1 SEC (ref 11.5–14.2)
RBC # BLD: 3.28 M/UL (ref 4.21–5.77)
RBC # BLD: ABNORMAL 10*6/UL
SEG NEUTROPHILS: 71 % (ref 36–65)
SEGMENTED NEUTROPHILS ABSOLUTE COUNT: 4.97 K/UL (ref 1.5–8.1)
SODIUM BLD-SCNC: 142 MMOL/L (ref 135–144)
SPECIMEN DESCRIPTION: ABNORMAL
SPECIMEN DESCRIPTION: ABNORMAL
WBC # BLD: 6.9 K/UL (ref 3.5–11.3)

## 2022-11-26 PROCEDURE — 97116 GAIT TRAINING THERAPY: CPT

## 2022-11-26 PROCEDURE — 6370000000 HC RX 637 (ALT 250 FOR IP): Performed by: INTERNAL MEDICINE

## 2022-11-26 PROCEDURE — 2580000003 HC RX 258: Performed by: FAMILY MEDICINE

## 2022-11-26 PROCEDURE — 83735 ASSAY OF MAGNESIUM: CPT

## 2022-11-26 PROCEDURE — 85610 PROTHROMBIN TIME: CPT

## 2022-11-26 PROCEDURE — 6370000000 HC RX 637 (ALT 250 FOR IP): Performed by: FAMILY MEDICINE

## 2022-11-26 PROCEDURE — 82947 ASSAY GLUCOSE BLOOD QUANT: CPT

## 2022-11-26 PROCEDURE — 80069 RENAL FUNCTION PANEL: CPT

## 2022-11-26 PROCEDURE — 97110 THERAPEUTIC EXERCISES: CPT

## 2022-11-26 PROCEDURE — 36415 COLL VENOUS BLD VENIPUNCTURE: CPT

## 2022-11-26 PROCEDURE — 85025 COMPLETE CBC W/AUTO DIFF WBC: CPT

## 2022-11-26 PROCEDURE — 1200000000 HC SEMI PRIVATE

## 2022-11-26 PROCEDURE — 6360000002 HC RX W HCPCS: Performed by: FAMILY MEDICINE

## 2022-11-26 RX ORDER — WARFARIN SODIUM 2 MG/1
4 TABLET ORAL
Status: COMPLETED | OUTPATIENT
Start: 2022-11-26 | End: 2022-11-26

## 2022-11-26 RX ORDER — POTASSIUM CHLORIDE 20 MEQ/1
40 TABLET, EXTENDED RELEASE ORAL ONCE
Status: COMPLETED | OUTPATIENT
Start: 2022-11-26 | End: 2022-11-26

## 2022-11-26 RX ORDER — LEVOFLOXACIN 250 MG/1
250 TABLET ORAL DAILY
Status: DISCONTINUED | OUTPATIENT
Start: 2022-11-26 | End: 2022-11-26

## 2022-11-26 RX ORDER — AMOXICILLIN 250 MG/1
250 CAPSULE ORAL EVERY 8 HOURS SCHEDULED
Status: DISCONTINUED | OUTPATIENT
Start: 2022-11-26 | End: 2022-11-26

## 2022-11-26 RX ORDER — LEVOFLOXACIN 250 MG/1
250 TABLET ORAL DAILY
Status: DISCONTINUED | OUTPATIENT
Start: 2022-11-26 | End: 2022-11-27

## 2022-11-26 RX ADMIN — CILOSTAZOL 100 MG: 100 TABLET ORAL at 11:38

## 2022-11-26 RX ADMIN — MIRTAZAPINE 7.5 MG: 7.5 TABLET ORAL at 20:51

## 2022-11-26 RX ADMIN — LEVOFLOXACIN 250 MG: 250 TABLET, FILM COATED ORAL at 22:48

## 2022-11-26 RX ADMIN — ISOSORBIDE MONONITRATE 30 MG: 30 TABLET, EXTENDED RELEASE ORAL at 09:00

## 2022-11-26 RX ADMIN — INSULIN LISPRO 6 UNITS: 100 INJECTION, SOLUTION INTRAVENOUS; SUBCUTANEOUS at 11:38

## 2022-11-26 RX ADMIN — CARVEDILOL 6.25 MG: 6.25 TABLET, FILM COATED ORAL at 17:03

## 2022-11-26 RX ADMIN — WARFARIN SODIUM 4 MG: 2 TABLET ORAL at 17:03

## 2022-11-26 RX ADMIN — SODIUM CHLORIDE, PRESERVATIVE FREE 10 ML: 5 INJECTION INTRAVENOUS at 11:40

## 2022-11-26 RX ADMIN — PRAVASTATIN SODIUM 20 MG: 20 TABLET ORAL at 20:51

## 2022-11-26 RX ADMIN — DOXAZOSIN 4 MG: 4 TABLET ORAL at 20:51

## 2022-11-26 RX ADMIN — SODIUM CHLORIDE, PRESERVATIVE FREE 10 ML: 5 INJECTION INTRAVENOUS at 20:52

## 2022-11-26 RX ADMIN — ALLOPURINOL 100 MG: 100 TABLET ORAL at 09:00

## 2022-11-26 RX ADMIN — CEFTRIAXONE SODIUM 1000 MG: 1 INJECTION, POWDER, FOR SOLUTION INTRAMUSCULAR; INTRAVENOUS at 11:45

## 2022-11-26 RX ADMIN — POTASSIUM CHLORIDE 40 MEQ: 1500 TABLET, EXTENDED RELEASE ORAL at 15:27

## 2022-11-26 NOTE — PROGRESS NOTES
Physician Progress Note      PATIENT:               Tomas Dyer  CSN #:                  [id number]  :                       1930  ADMIT DATE:       2022 2:40 PM  DISCH DATE:  RESPONDING  PROVIDER #:        Rodrigo Mayfield MD          QUERY TEXT:    Patient admitted with dehydration. Noted documentation of \"acute kidney   injury\" in H&P on 22 with creatinine 2.30.   Nephrology consult   notes a baseline creatinine of 1.9. In order to support the diagnosis of DELROY,   please include additional clinical indicators in your documentation, or   please document if the diagnosis of DELROY has been ruled out after further   study. The medical record reflects the following:  Risk Factors: dehydration, CKD 3, CHF, diuretics, DM, decreased oral intake,   n/v, advance age  Clinical Indicators: H&P noted DELROY; baseline creatinine: 1.9; creatinine on   admission: 2.30  Treatment: IV fluids, labs, Nephrology consult    Defined by Kidney Disease Improving Global Outcomes (KDIGO) clinical practice   guideline for acute kidney injury:  -Increase in SCr by greater than or equal to 0.3 mg/dl within 48 hours; or  -Increase or decrease in SCr to greater than or equal to 1.5 times baseline,   which is known or presumed to have occurred within the prior 7 days; or  -Urine volume < 0.5ml/kg/h for 6 hours. Options provided:  -- Acute kidney injury evidenced by, Please document evidence.   -- CKD 3 without DELROY  -- Other - I will add my own diagnosis  -- Disagree - Not applicable / Not valid  -- Disagree - Clinically unable to determine / Unknown  -- Refer to Clinical Documentation Reviewer    PROVIDER RESPONSE TEXT:    This patient has acute kidney injury as evidenced by Baseline creatinine 1.6,   had worsened to 2.3    Query created by: Toni Parish on 2022 1:11 PM      Electronically signed by:  Rodrigo Mayfield MD 2022 6:32 PM

## 2022-11-26 NOTE — PROGRESS NOTES
Physical Therapy  Facility/Department: [facility]  Rehabilitation Physical Therapy Treatment Note    NAME: Lolita Solitario  : 1930 (80 y.o.)  MRN: [id number]  CODE STATUS: DNR-CCA    Date of Service: 22  Assessment: Pt displays deficits regarding balance, endurance, and safety awareness and would benefit from cont skilled PT services in order to safely return to [facility] as able. Activity Tolerance: Patient tolerated treatment well  Discharge Recommendations: Patient would benefit from continued therapy after discharge    Restrictions:  Restrictions/Precautions: General Precautions; Fall Risk;Up as Tolerated  Position Activity Restriction  Other position/activity restrictions: Up with assist, easy to chew diet, ALEXANDER diet, ALARMS,  RUE IV     SUBJECTIVE  Subjective  Subjective: Pt in bedside chair upon entry. Pt very pleasant and agreeable with PT. Pain: Denies pain. OBJECTIVE  Cognition  Arousal/Alertness: Delayed responses to stimuli  Following Commands: Follows multistep commands with increased time; Follows multistep commands consistently  Attention Span: Appears intact  Memory: Decreased recall of precautions;Decreased recall of recent events  Safety Judgement: Decreased awareness of need for assistance;Decreased awareness of need for safety  Problem Solving: Decreased awareness of errors  Insights: Decreased awareness of deficits  Initiation: Requires cues for some  Sequencing: Requires cues for some  Orientation  Orientation Level: Oriented X4    Functional Mobility  Bed Mobility  Overall Assistance Level: Stand By Assist  Additional Factors: Increased time to complete; With handrails; Head of bed raised  Supine to Sit  Assistance Level: Stand by assist  Skilled Clinical Factors: Pt able to achieve EOB sitting with increased time  Transfers  Surface: From bed;Standard toilet; To chair with arms  Additional Factors: Increased time to complete;Hand placement cues; Set-up  Device: Walker  Sit to Stand  Assistance Level: Contact guard assist  Skilled Clinical Factors: Cues to push up from seated surface and avoid pulling up on RW. Demos fair steadiness with STS. Increased difficulty and time performing STS from toilet. Stand to Sit  Assistance Level: Contact guard assist  Skilled Clinical Factors: Fair eccentric quad control noted when returning to chair. Pt displays good hand placement and safe technique. Environmental Mobility  Ambulation  Surface: Level surface  Device: Rolling walker  Distance: ~200ft total  Activity: Within Unit  Activity Comments: Pt denies dizziness and SOB throughout. Additional Factors: Increased time to complete;Set-up; Verbal cues  Assistance Level: Contact guard assist;Stand by assist (Intermittent SBA)  Gait Deviations: Slow emmanuel;Decreased step length bilateral  Skilled Clinical Factors: Cues for upright posture and scanning environment during ambulation. No LOB noted. PT Exercises  Pressure Relief Exercises: WS seated & glut sets, 10 reps  Dynamic Standing Balance Exercises: Pt completed standing marches x15 reps and SLS 3\" x15 reps each LE with use of RW for support. No LOB noted. Seated rest breaks in between as needed. Postural Correction Exercises: upright posture  Breathing Techniques: pursed lip breathing    Goals  Short Term Goals  Time Frame for Short Term Goals: 12 visits  Short Term Goal 1: Inc bed-mobility & transfers to independent to enable pt to safely get in/OOB & chair to return to OF & decrease risk for falls  Short Term Goal 2: Inc gait to amb 200ft or > indep w/ RW to enable pt to return to previous level of independence & able to demonstrate indep/ safe use of RW in functional activities including approaching surfaces and turning to sit  Short Term Goal 3:  Inc strength to [facility] & seated & standing balance(with device) to good to facilitate pt independence for performance of ADL's & functional mobility, & reduce fall risk  Short Term Goal 4: Pt able to tolerate 30-40 min of activity to include 15-20 reps of ex, NMR & functional mobility with device to facilitate activity tolerance to [facility]  Short Term Goal 5: Ed pt on home ex's, safety, balance & endurance training, fall prevention, & issue written Pt Ed    PLAN OF CARE/SAFETY  Physcial Therapy Plan  General Plan: 5-7 times per week  Current Treatment Recommendations: Strengthening;Balance training;Functional mobility training;Transfer training;ADL/Self-care training; Endurance training;Gait training;Home exercise program;Safety education & training;Patient/Caregiver education & training;Neuromuscular re-education  Safety Devices  Type of Devices: Left in bed;Call light within reach; All fall risk precautions in place; Heels elevated for pressure relief;Nurse notified;Gait belt    EDUCATION  Education  Education Given To: Patient  Education Provided: Safety; Mobility Training;Transfer Training  Education Method: Verbal  Barriers to Learning: None  Education Outcome: Verbalized understanding;Continued education needed    [facility] 19    Therapy Time   Individual Concurrent Group Co-treatment   Time In [address] [place] [address]         Time Out 1010         Minutes [place], [place], 11/26/22 at 1:24 PM

## 2022-11-26 NOTE — CARE COORDINATION
Social work [facility] for [employer]. Will need cade, Rx and discharge order at discharge.  Jeanmarie newman

## 2022-11-26 NOTE — PROGRESS NOTES
Nutrition Assessment     Type and Reason for Visit: Reassess    Nutrition Recommendations/Plan:   Continue ADULT DIET; Easy to Chew; Low Fat/Low Chol/High Fiber/ALEXANDER  Discontinue Magic Cup  Monitor p.o intakes and labs     Malnutrition Assessment:  Malnutrition Status: Severe malnutrition    Nutrition Assessment:  Patient reports eating about 50% at meals. Patient does not like chocolate Magic Cup and does not like Ensure Enlive. Continue current diet and discontinue Magic Cup. Monitor p.o intakes and labs. Estimated Daily Nutrient Needs:  Energy (kcal):  1885 kcal (25 kcal/kg) Weight Used for Energy Requirements: Current     Protein (g):   gm of protein (1.2-1.3 gm/kg) Weight Used for Protein Requirements: Ideal            Nutrition Related Findings:   No edema. Active bowel sounds.  Nausea Wound Type: None    Current Nutrition Therapies:    ADULT DIET; Easy to Chew; Low Fat/Low Chol/High Fiber/ALEXANDER  ADULT ORAL NUTRITION SUPPLEMENT; Lunch, Dinner; Frozen Oral Supplement    Anthropometric Measures:  Height: 5' 11\" (180.3 cm)  Current Body Wt: 170 lb (77.1 kg)   BMI: 23.7    Nutrition Diagnosis:   Severe malnutrition related to inadequate protein-energy intake as evidenced by weight loss, poor intake prior to admission, intake 26-50%, intake 0-25%, weight loss 7.5% in 3 months    Nutrition Interventions:   Food and/or Nutrient Delivery: Continue Current Diet, Start Oral Nutrition Supplement  Nutrition Education/Counseling: Education not indicated  Coordination of Nutrition Care: Continue to monitor while inpatient       Goals:     Goals: PO intake 75% or greater       Nutrition Monitoring and Evaluation:      Food/Nutrient Intake Outcomes: Food and Nutrient Intake, Supplement Intake  Physical Signs/Symptoms Outcomes: Biochemical Data, Fluid Status or Edema, Skin, Weight    Discharge Planning:    Continue current diet, Continue Oral Nutrition Supplement           Andrés Salinas  MFN, RDN, LDN  Lead Clinical Dietitian  RD Office Phone [phone]

## 2022-11-26 NOTE — PROGRESS NOTES
[facility].,    Adult Hospitalist      Name: Phuong Gann  MRN: [id number]     Acct: [de-identified]  Room: 2012/2012-02    Admit Date: 11/22/2022  2:40 PM  PCP: Michael Jordan MD    Primary Problem  Principal Problem:    Dehydration  Active Problems:    Severe malnutrition ([address]  Resolved Problems:    * No resolved hospital problems. *        Assesment:     Bacteremia with Strep Viridans / Strep Mitis/ Strep Oralis and Staph Hominis  Fatigue improved  Anorexia improved  Acute kidney injury Creat improved from 2.3 to 1.6  Acute dehydration/weakness  Coronary artery disease, native vessel  Atrial fibrillation with controlled rate  History of deep vein thrombosis with pulmonary embolism  Cardiomyopathy  Chronic kidney disease stage III  Diabetes mellitus type 2  Diabetic neuropathy  Essential hypertension  Peripheral artery disease  Gastroesophageal reflux disease without esophagitis  Mixed hyperlipidemia  Gout  Major depressive disorder        Plan:     Admit to progressive  Monitor vitals closely  Keep SPO2 above 90%  I's and O's  IV fluids  Pain control  Antiemetics as needed  Urinary sodium  Recheck renal function  Resume essential home medication  Avoid nephrotoxic agents  CBC, BMP  Consult nephrology  Accu-Cheks before meals and at bedtime  Insulin sliding scale medium  Hypoglycemia protocol  Rocephin  Mertazipine  D/w RN  [facility]  Culture sensitivity shows response to penicillin, Levaquin-discussed with pharmacist.  His QT interval was prolonged. There is some history of tendon repair as well. Penicillin caused diarrhea and he tolerated Rocephin. We will switch to amoxicillin right now. Will switch to p.o. Await pre-CERT for SNF  DVT and GI prophylaxis.         Chief Complaint:     Chief Complaint   Patient presents with    Fatigue         History of Present Illness:      Phuong Gann is a 80 y.o.  male who presents with Fatigue    Patient admitted through the emergency room where he presented with progressive weakness for last 2 days. And says he had been nauseous and vomited once today. Patient says he was not able to eat or drink much weakness continued to worsen after which she was brought to the emergency room patient says he does not understand why he was nauseous. He also says he had poor appetite for several days before that but he does not know why. Is having any fever or chills. Denies any respiratory infection, diarrhea or dysuria. Patient states he has not had a bowel movement for 3 days    Patient says his wife passed away in February of this year. He denies any depression at this time    Patient denies any chest pain, dyspnea or orthopnea. Denies headache, photophobia or diplopia. Denies neck pain or back pain. Denies dysuria    11/23- pt was able to sit up and ambulate w assistance. Still not having much appetite. Abx started since positive blood culture. Also noted depressin, tearful, low energy, low motivation. SSRI started. COde status discussed. Pt wishes to be [facility]    11/24- Pt feels more appetite. Sitting up in chair. Son at bedside. Mood better also. Await c/s. 2/2 B Cx positive. Pt now prepared to go to SNF    11/25-patient improving every day. Appetite improving daily. Able to tolerate 70% meals. Ambulating more. Culture sensitivity awaited. Discharge planning to SNF    11/26-patient showed significant improvement in appetite and mood after initiation of antibiotics. Recent culture and sensitivity reports show sensitive to Levaquin and penicillins. Also sensitive to Bactrim which we will not use because of his renal function. Renal function has improved from a creatinine of 2.3 down to 1.6. Await pre-CERT for SNF    discussed with pharmacist.  His QT interval was prolonged. There is some history of tendon repair as well. Penicillin caused diarrhea and he tolerated Rocephin.   We will switch to amoxicillin right now      I have personally reviewed the past medical history, past surgical history, medications, social history, and family history, and summarized in the note. Review of Systems:     All 10 point system is reviewed and negative otherwise mentioned in HPI. Past Medical History:     Past Medical History:   Diagnosis Date    Achilles tendon pain 1980's    Repair, Had DVT post surgery    Anemia     Atrial fibrillation (HCC)     Cardiomyopathy ([address]     Cardiomyopathy ([address]     Cataract     CHF (congestive heart failure) (HCC)     Chronic kidney disease     Stage 3    COPD (chronic obstructive pulmonary disease) ([address]     Dental disease     Diabetes mellitus ([facility]     Type 2     Diabetic neuropathy (HCC)     Bilateral Lower legs distal to knees    DVT (deep venous thrombosis) ([facility] [address] 1980's    With PE     Edema     Bilateral Legs    Entropion 06/2016    Right Lower Lid    Gout     Hearing loss     Hyperlipidemia     Hypertension     Ribs, multiple fractures     Per [facility] records        Past Surgical History:     Past Surgical History:   Procedure Laterality Date    ACHILLES TENDON SURGERY Left 1980's    Had DVT post surgery    COLONOSCOPY  1990's    EYE SURGERY Right 07/12/2016    Repair Entropion eye, Rt lower lid. Surgeon Edgar Reynolds at [facility]    [place]     2 Lt inguinal    LIPOMA RESECTION  1980's    TONSILLECTOMY  1940's        Medications Prior to Admission:       Prior to Admission medications    Medication Sig Start Date End Date Taking?  Authorizing Provider   isosorbide mononitrate (IMDUR) 60 MG extended release tablet Take 30 mg by mouth daily   Yes Historical Provider, MD   spironolactone (ALDACTONE) 25 MG tablet Take 12.5 mg by mouth daily   Yes Historical Provider, MD   darbepoetin calos-polysorbate (ARANESP) 40 MCG/0.4ML SOSY injection Inject 40 mcg as directed every 30 days   Yes Historical Provider, MD   warfarin (COUMADIN) 2 MG tablet Take 2 mg by mouth Twice a Week Monday AND FRIDAY   Yes Historical Provider, MD   Cholecalciferol (VITAMIN D3) 1.25 MG (45851 UT) TABS Take 1 tablet by mouth every 30 days Last Friday of the month. Yes Historical Provider, MD   allopurinol (ZYLOPRIM) 100 MG tablet Take 100 mg by mouth daily     Historical Provider, MD   warfarin (COUMADIN) 2 MG tablet Take 4 mg by mouth Five times weekly Tuesday, Wednesday, Thursday,Saturday and . Historical Provider, MD   gabapentin (NEURONTIN) 300 MG capsule Take 300 mg by mouth 2 times daily     Historical Provider, MD   doxazosin (CARDURA) 4 MG tablet Take 4 mg by mouth at bedtime    Historical Provider, MD   carvedilol (COREG) 6.25 MG tablet Take 6.25 mg by mouth 2 times daily     Historical Provider, MD   furosemide (LASIX) 40 MG tablet Take 40 mg by mouth 2 times daily (before meals)    Historical Provider, MD   cilostazol (PLETAL) 100 MG tablet Take 100 mg by mouth daily    Historical Provider, MD   pravastatin (PRAVACHOL) 40 MG tablet Take 20 mg by mouth at bedtime    Historical Provider, MD        Allergies:       Bumetanide, Cephalexin, Omeprazole, and Amoxicillin    Social History:     Tobacco:    reports that he has never smoked. He has never used smokeless tobacco.  Alcohol:      reports current alcohol use of about 7.0 standard drinks per week. Drug Use:  reports no history of drug use. Family History:     History reviewed. No pertinent family history.       Physical Exam:     Vitals:  BP (!) 143/53   Pulse (!) 26   Temp 97.7 °F (36.5 °C)   Resp 18   Ht 5' 11\" (1.803 m)   Wt 170 lb 1.6 oz (77.2 kg)   SpO2 96%   BMI 23.72 kg/m²   Temp (24hrs), Av.3 °F (36.8 °C), Min:97.7 °F (36.5 °C), Max:98.8 °F (37.1 °C)      General appearance - alert, well appearing, and in no acute distress  Mental status - oriented to person, place, and time with normal affect  Head - normocephalic and atraumatic  Eyes - pupils equal and reactive, extraocular eye movements intact, conjunctiva clear  Ears - hearing appears to be intact  Nose - no drainage noted  Mouth - mucous membranes dry  Neck - supple, no carotid bruits, thyroid not palpable  Chest - clear to auscultation, normal effort  Heart - normal rate, regular rhythm, no murmur  Abdomen - soft, nontender, nondistended, bowel sounds present all four quadrants, no masses, hepatomegaly or splenomegaly  Neurological - normal speech, no focal findings or movement disorder noted, cranial nerves II through XII grossly intact  Extremities - peripheral pulses palpable, no pedal edema or calf pain with palpation. There is significant pigmentation. Bilateral lower extremity sensory neuropathy  Skin -bilateral lower extremity pigmentation. Data:     Labs:    Hematology:  Recent Labs     11/24/22  0600 11/25/22  0656 11/26/22  0533   WBC  --  6.7 6.9   RBC  --  3.51* 3.28*   HGB  --  10.8* 10.0*   HCT  --  34.1* 31.7*   MCV  --  97.2 96.6   MCH  --  30.8 30.5   MCHC  --  31.7 31.5   RDW  --  14.6* 14.6*   PLT  --  99* 95*   MPV  --  9.9 9.8   INR 3.3 2.9 2.5       Chemistry:  Recent Labs     11/24/22  0600 11/25/22  0656 11/26/22  0533    143 142   K 3.1* 3.5* 3.1*    102 101   CO2 31 31 29   GLUCOSE 173* 188* 172*   BUN 74* 63* 55*   CREATININE 2.12* 1.93* 1.69*   MG  --  2.3 2.3   ANIONGAP 11 10 12   LABGLOM 29* 32* 38*   CALCIUM 9.4 9.5 9.2   PHOS 3.0 3.5 3.2       Recent Labs     11/24/22  0600 11/24/22  0616 11/25/22  0656 11/25/22  1112 11/25/22  1652 11/25/22  2134 11/26/22  0533 11/26/22  0625 11/26/22  1126 11/26/22  1150 11/26/22  1625   LABALBU 3.6  --  3.8  --   --   --  3.5  --   --   --   --    POCGLU  --    < >  --    < > 278* 163*  --  181* 341* 308* 178*    < > = values in this interval not displayed.          Lab Results   Component Value Date    INR 2.5 11/26/2022    INR 2.9 11/25/2022    INR 3.3 11/24/2022    PROTIME 27.1 (H) 11/26/2022    PROTIME 30.3 (H) 11/25/2022    PROTIME 33.6 (H) 11/24/2022       Lab Results   Component Value Date/Time    SPECIAL generate a document that actually reflects the content of the visit, no guarantees can be provided that every mistake has been identified and corrected by editing. Note was updated later by me after  physical examination and  completion of the assessment. no diabetes and no thyroid trouble.

## 2022-11-26 NOTE — PLAN OF CARE
Problem: Safety - Adult  Goal: Free from fall injury  Outcome: Progressing     Problem: Chronic Conditions and Co-morbidities  Goal: Patient's chronic conditions and co-morbidity symptoms are monitored and maintained or improved  Outcome: Progressing     Problem: Skin/Tissue Integrity - Adult  Goal: Skin integrity remains intact  Outcome: Progressing  Flowsheets (Taken 11/25/2022 1920)  Skin Integrity Remains Intact: Monitor for areas of redness and/or skin breakdown

## 2022-11-26 NOTE — PROGRESS NOTES
Reason for Follow up: Acute on chronic kidney disease/fluid electrolyte management    Subjective:    Prema Caputo was seen and evaluated. He is able to ambulate. He currently is not wearing his lower extremity compression device and has residual edema of the markedly improved. He has had no further orthostatic symptoms or near falls but has been assisted with his ambulation. No worsening shortness of breath chest pain palpitations or difficulty voiding.   Currently off diuretics and off IVF    Review Of Systems: Otherwise as noted in HPI      Scheduled Meds:   potassium chloride  40 mEq Oral Once    cefTRIAXone (ROCEPHIN) IV  1,000 mg IntraVENous Q24H    mirtazapine  7.5 mg Oral Nightly    allopurinol  100 mg Oral Daily    carvedilol  6.25 mg Oral BID WC    cilostazol  100 mg Oral Daily    pravastatin  20 mg Oral Nightly    isosorbide mononitrate  30 mg Oral Daily    sodium chloride flush  5-40 mL IntraVENous 2 times per day    doxazosin  4 mg Oral Nightly    insulin lispro  0-8 Units SubCUTAneous TID WC    insulin lispro  0-4 Units SubCUTAneous Nightly    warfarin placeholder: dosing by pharmacy   Other RX Placeholder     Continuous Infusions:   sodium chloride      dextrose       PRN Meds:potassium chloride **OR** potassium alternative oral replacement **OR** potassium chloride, magnesium sulfate, sodium chloride flush, sodium chloride, ondansetron **OR** ondansetron, acetaminophen **OR** acetaminophen, glucose, dextrose bolus **OR** dextrose bolus, glucagon (rDNA), dextrose    Allergies   Allergen Reactions    Bumetanide Nausea And Vomiting    Cephalexin Nausea And Vomiting    Omeprazole Nausea And Vomiting    Amoxicillin Diarrhea       Physical Exam:    Vitals:    11/25/22 2041 11/26/22 0050 11/26/22 0528 11/26/22 0836   BP: (!) 118/51 127/63  114/75   Pulse: 59 53  (!) 38   Resp: 16 16  17   Temp: 97.9 °F (36.6 °C) 98.8 °F (37.1 °C)  98.1 °F (36.7 °C)   TempSrc: Oral Oral     SpO2: 95% 94%  94%   Weight:   170 lb 1.6 oz (77.2 kg)    Height:         I/O last 3 completed shifts: In: 1290.5 [I.V.:1143.6; IV Piggyback:146.9]  Out: -     PHYSICAL EXAM:  Ananya Hodges is awake and alert in no acute distress. He is currently resting on room air. Neck veins are nondistended. He is in a rate controlled atrial fibrillation. His chest is diminished bilaterally but clear. He is a protuberant but nontender abdomen. Chronic lower extremity edema with 1-2+ pretibial edema and stasis dermatitis. No metabolic flap or gross motor deficit.     Data:    CBC:   Lab Results   Component Value Date    WBC 6.9 11/26/2022    HGB 10.0 (L) 11/26/2022    HCT 31.7 (L) 11/26/2022    MCV 96.6 11/26/2022    PLT 95 (L) 11/26/2022     BMP:    Lab Results   Component Value Date     11/26/2022     11/25/2022     11/24/2022    K 3.1 (L) 11/26/2022    K 3.5 (L) 11/25/2022    K 3.1 (L) 11/24/2022     11/26/2022     11/25/2022     11/24/2022    CO2 29 11/26/2022    CO2 31 11/25/2022    CO2 31 11/24/2022    BUN 55 (H) 11/26/2022    BUN 63 (H) 11/25/2022    BUN 74 (H) 11/24/2022    CREATININE 1.69 (H) 11/26/2022    CREATININE 1.93 (H) 11/25/2022    CREATININE 2.12 (H) 11/24/2022    GLUCOSE 172 (H) 11/26/2022    GLUCOSE 188 (H) 11/25/2022    GLUCOSE 173 (H) 11/24/2022     CMP:   Lab Results   Component Value Date/Time     11/26/2022 05:33 AM    K 3.1 11/26/2022 05:33 AM     11/26/2022 05:33 AM    CO2 29 11/26/2022 05:33 AM    BUN 55 11/26/2022 05:33 AM    CREATININE 1.69 11/26/2022 05:33 AM    GLUCOSE 172 11/26/2022 05:33 AM    CALCIUM 9.2 11/26/2022 05:33 AM    PROT 6.5 11/23/2022 06:45 AM    LABALBU 3.5 11/26/2022 05:33 AM    BILITOT 0.9 11/23/2022 06:45 AM    ALKPHOS 90 11/23/2022 06:45 AM    AST 11 11/23/2022 06:45 AM    ALT <5 11/23/2022 06:45 AM      Hepatic:   Lab Results   Component Value Date    AST 11 11/23/2022    ALT <5 (L) 11/23/2022    BILITOT 0.9 11/23/2022    ALKPHOS 90 11/23/2022     BNP: No results found for: BNP  Lipids: No results found for: CHOL, HDL  INR:   Lab Results   Component Value Date    INR 2.5 11/26/2022    INR 2.9 11/25/2022    INR 3.3 11/24/2022     PTH: No results found for: PTH  Phosphorus:    Lab Results   Component Value Date/Time    PHOS 3.2 11/26/2022 05:33 AM     Ionized Calcium: No results found for: IONCA  Magnesium:   Lab Results   Component Value Date/Time    MG 2.3 11/26/2022 05:33 AM     Albumin:   Lab Results   Component Value Date/Time    LABALBU 3.5 11/26/2022 05:33 AM         Radiology:  EXAMINATION:   ONE XRAY VIEW OF THE CHEST       11/22/2022 3:21 pm       COMPARISON:   12/09/2018       HISTORY:   ORDERING SYSTEM PROVIDED HISTORY: weak   TECHNOLOGIST PROVIDED HISTORY:   weak   Reason for Exam: fatigue   Additional signs and symptoms: fatigue and weak       FINDINGS:   Stable cardiomegaly with calcific plaque of the thoracic aorta. No signs of   pulmonary vascular congestion. Aside from mild chronic appearing changes   lungs appear grossly clear. Minimal subsegmental atelectasis/scarring left   lung base. No new focal lung consolidation. No large pleural effusions. Bones are osteopenic with degenerative changes. Old left rib fractures. Impression   No acute cardiopulmonary process suspected         Assessment:  Acute on CKD3B baseline SCr ~ 1.9. Overt low grade proteinuria  Sepsis/2 out of 2 blood cultures positive for Strep hominis: on Rocephin  DM2  HTN  HLD  Gout  CHF  Chronic Afib  Peripheral Neuropathy  Chronic multifactorial LE edema  Hypokalemia    Plan:  Off IV fluids  Will give KCl 40 meq x 1 today  Continue PT OT and supportive care  Follow up labs. Please do not hesitate to call with questions. Electronically signed by Dalila Jacob MD on 11/26/2022 at 2:14 PM  [facility].   Ph: [phone]

## 2022-11-27 LAB
ABSOLUTE EOS #: 0.27 K/UL (ref 0–0.44)
ABSOLUTE IMMATURE GRANULOCYTE: 0.06 K/UL (ref 0–0.3)
ABSOLUTE LYMPH #: 0.9 K/UL (ref 1.1–3.7)
ABSOLUTE MONO #: 0.65 K/UL (ref 0.1–1.2)
ALBUMIN SERPL-MCNC: 3.4 G/DL (ref 3.5–5.2)
ANION GAP SERPL CALCULATED.3IONS-SCNC: 10 MMOL/L (ref 9–17)
BASOPHILS # BLD: 0 % (ref 0–2)
BASOPHILS ABSOLUTE: <0.03 K/UL (ref 0–0.2)
BUN BLDV-MCNC: 49 MG/DL (ref 8–23)
BUN/CREAT BLD: 30 (ref 9–20)
CALCIUM SERPL-MCNC: 9 MG/DL (ref 8.6–10.4)
CHLORIDE BLD-SCNC: 102 MMOL/L (ref 98–107)
CO2: 29 MMOL/L (ref 20–31)
CREAT SERPL-MCNC: 1.66 MG/DL (ref 0.7–1.2)
EOSINOPHILS RELATIVE PERCENT: 4 % (ref 1–4)
GFR SERPL CREATININE-BSD FRML MDRD: 38 ML/MIN/1.73M2
GLUCOSE BLD-MCNC: 180 MG/DL (ref 75–110)
GLUCOSE BLD-MCNC: 181 MG/DL (ref 70–99)
GLUCOSE BLD-MCNC: 215 MG/DL (ref 75–110)
GLUCOSE BLD-MCNC: 273 MG/DL (ref 75–110)
GLUCOSE BLD-MCNC: 64 MG/DL (ref 75–110)
GLUCOSE BLD-MCNC: 92 MG/DL (ref 75–110)
HCT VFR BLD CALC: 30.5 % (ref 40.7–50.3)
HEMOGLOBIN: 9.8 G/DL (ref 13–17)
IMMATURE GRANULOCYTES: 1 %
INR BLD: 2.3
LYMPHOCYTES # BLD: 13 % (ref 24–43)
MAGNESIUM: 2.3 MG/DL (ref 1.6–2.6)
MCH RBC QN AUTO: 30.8 PG (ref 25.2–33.5)
MCHC RBC AUTO-ENTMCNC: 32.1 G/DL (ref 28.4–34.8)
MCV RBC AUTO: 95.9 FL (ref 82.6–102.9)
MONOCYTES # BLD: 10 % (ref 3–12)
NRBC AUTOMATED: 0 PER 100 WBC
PDW BLD-RTO: 14.6 % (ref 11.8–14.4)
PHOSPHORUS: 2.6 MG/DL (ref 2.5–4.5)
PLATELET # BLD: 90 K/UL (ref 138–453)
PMV BLD AUTO: 9.4 FL (ref 8.1–13.5)
POTASSIUM SERPL-SCNC: 3.4 MMOL/L (ref 3.7–5.3)
PROTHROMBIN TIME: 25.3 SEC (ref 11.5–14.2)
RBC # BLD: 3.18 M/UL (ref 4.21–5.77)
RBC # BLD: ABNORMAL 10*6/UL
SEG NEUTROPHILS: 72 % (ref 36–65)
SEGMENTED NEUTROPHILS ABSOLUTE COUNT: 4.92 K/UL (ref 1.5–8.1)
SODIUM BLD-SCNC: 141 MMOL/L (ref 135–144)
WBC # BLD: 6.8 K/UL (ref 3.5–11.3)

## 2022-11-27 PROCEDURE — 80069 RENAL FUNCTION PANEL: CPT

## 2022-11-27 PROCEDURE — 1200000000 HC SEMI PRIVATE

## 2022-11-27 PROCEDURE — 6360000002 HC RX W HCPCS: Performed by: INTERNAL MEDICINE

## 2022-11-27 PROCEDURE — 85025 COMPLETE CBC W/AUTO DIFF WBC: CPT

## 2022-11-27 PROCEDURE — 97110 THERAPEUTIC EXERCISES: CPT

## 2022-11-27 PROCEDURE — 85610 PROTHROMBIN TIME: CPT

## 2022-11-27 PROCEDURE — 83735 ASSAY OF MAGNESIUM: CPT

## 2022-11-27 PROCEDURE — 82947 ASSAY GLUCOSE BLOOD QUANT: CPT

## 2022-11-27 PROCEDURE — 87040 BLOOD CULTURE FOR BACTERIA: CPT

## 2022-11-27 PROCEDURE — 2580000003 HC RX 258: Performed by: INTERNAL MEDICINE

## 2022-11-27 PROCEDURE — 36415 COLL VENOUS BLD VENIPUNCTURE: CPT

## 2022-11-27 PROCEDURE — 2580000003 HC RX 258: Performed by: FAMILY MEDICINE

## 2022-11-27 PROCEDURE — 99222 1ST HOSP IP/OBS MODERATE 55: CPT | Performed by: INTERNAL MEDICINE

## 2022-11-27 PROCEDURE — 6370000000 HC RX 637 (ALT 250 FOR IP): Performed by: INTERNAL MEDICINE

## 2022-11-27 PROCEDURE — 6370000000 HC RX 637 (ALT 250 FOR IP): Performed by: FAMILY MEDICINE

## 2022-11-27 PROCEDURE — 97116 GAIT TRAINING THERAPY: CPT

## 2022-11-27 PROCEDURE — 97530 THERAPEUTIC ACTIVITIES: CPT

## 2022-11-27 RX ORDER — POTASSIUM CHLORIDE 20 MEQ/1
40 TABLET, EXTENDED RELEASE ORAL ONCE
Status: COMPLETED | OUTPATIENT
Start: 2022-11-27 | End: 2022-11-27

## 2022-11-27 RX ORDER — WARFARIN SODIUM 3 MG/1
3 TABLET ORAL
Status: COMPLETED | OUTPATIENT
Start: 2022-11-27 | End: 2022-11-27

## 2022-11-27 RX ORDER — ALOGLIPTIN 6.25 MG/1
6.25 TABLET, FILM COATED ORAL DAILY
Status: DISCONTINUED | OUTPATIENT
Start: 2022-11-27 | End: 2022-11-29 | Stop reason: HOSPADM

## 2022-11-27 RX ADMIN — SODIUM CHLORIDE, PRESERVATIVE FREE 10 ML: 5 INJECTION INTRAVENOUS at 21:41

## 2022-11-27 RX ADMIN — INSULIN LISPRO 2 UNITS: 100 INJECTION, SOLUTION INTRAVENOUS; SUBCUTANEOUS at 17:53

## 2022-11-27 RX ADMIN — DOXAZOSIN 4 MG: 4 TABLET ORAL at 21:41

## 2022-11-27 RX ADMIN — POTASSIUM CHLORIDE 40 MEQ: 1500 TABLET, EXTENDED RELEASE ORAL at 17:54

## 2022-11-27 RX ADMIN — INSULIN LISPRO 4 UNITS: 100 INJECTION, SOLUTION INTRAVENOUS; SUBCUTANEOUS at 13:53

## 2022-11-27 RX ADMIN — PRAVASTATIN SODIUM 20 MG: 20 TABLET ORAL at 21:41

## 2022-11-27 RX ADMIN — CARVEDILOL 6.25 MG: 6.25 TABLET, FILM COATED ORAL at 17:54

## 2022-11-27 RX ADMIN — ALOGLIPTIN 6.25 MG: 6.25 TABLET, FILM COATED ORAL at 17:54

## 2022-11-27 RX ADMIN — ALLOPURINOL 100 MG: 100 TABLET ORAL at 10:22

## 2022-11-27 RX ADMIN — CEFTRIAXONE 2000 MG: 2 INJECTION, POWDER, FOR SOLUTION INTRAMUSCULAR; INTRAVENOUS at 13:53

## 2022-11-27 RX ADMIN — ISOSORBIDE MONONITRATE 30 MG: 30 TABLET, EXTENDED RELEASE ORAL at 10:22

## 2022-11-27 RX ADMIN — CILOSTAZOL 100 MG: 100 TABLET ORAL at 10:22

## 2022-11-27 RX ADMIN — WARFARIN SODIUM 3 MG: 3 TABLET ORAL at 17:54

## 2022-11-27 RX ADMIN — MIRTAZAPINE 7.5 MG: 7.5 TABLET ORAL at 21:41

## 2022-11-27 RX ADMIN — SODIUM CHLORIDE, PRESERVATIVE FREE 10 ML: 5 INJECTION INTRAVENOUS at 13:56

## 2022-11-27 NOTE — PROGRESS NOTES
30 LISETH Hodges  Occupational Therapy Not Seen    DATE: 2022    NAME: Gabbi Hsu  MRN: [id number]   : 1930    Patient not seen this date for Occupational Therapy due to:      [x] Refusal by Patient: 1st attempt pt just finished PT and requesting to rest. 2nd attempt pt eating lunch.        NANCY Ramirez

## 2022-11-27 NOTE — PROGRESS NOTES
Physical Therapy  Facility/Department: [facility] MED SURG  Rehabilitation Physical Therapy Treatment Note    NAME: Pia Denver  : 1930 (38 y.o.)  MRN: [id number]  CODE STATUS: DNR-CCA    Date of Service: 22   Pt currently functioning below baseline. Recommend daily inpatient skilled therapy at time of discharge to maximize long term outcomes and prevent re-admission. Please refer to AM-PAC score for current level of function. Restrictions:  Restrictions/Precautions: General Precautions; Fall Risk;Up as Tolerated  Position Activity Restriction  Other position/activity restrictions: Up with assist, easy to chew diet, ALEXANDER diet, ALARMS,  RUE IV     SUBJECTIVE  Subjective  Subjective: Patient up in bathroom upon therapists arrival.  Patient agreeable to PT treatment. RN Sunshine Muniz states patient is appropriate for PT treatment    OBJECTIVE  Cognition  Arousal/Alertness: Delayed responses to stimuli  Following Commands: Follows multistep commands with increased time; Follows multistep commands consistently  Attention Span: Appears intact  Memory: Decreased recall of precautions;Decreased recall of recent events  Safety Judgement: Decreased awareness of need for assistance;Decreased awareness of need for safety  Problem Solving: Decreased awareness of errors  Insights: Decreased awareness of deficits  Initiation: Requires cues for some  Sequencing: Requires cues for some  Cognition Comment: Slight deficit in recalling recent events. Pt stating he was up late last night watching football. Specifically \"[place] vs [place]\". Iggy Leone played the [employer] last night.   Orientation  Overall Orientation Status: Impaired  Orientation Level: Oriented X4    Functional Mobility  Balance  Sitting Balance: Supervision  Standing Balance: Contact guard assistance  Standing Balance  Time: 3 minutes  Activity: standing in front of toilet with Single UE support on assistive device for support performing self dariana care  Comments: Patient requries a MIN A for sit to stand from lower (standard toilet) surface. Patient works across body performing pericare, ambulates to sink performs hand washing this date. Transfers  Surface: From bed;Standard toilet; To chair with arms  Additional Factors: Increased time to complete;Hand placement cues; Set-up  Device: Walker  Sit to Stand  Assistance Level: Minimal assistance  Skilled Clinical Factors: Cues to push up from seated surface and avoid pulling up on RW. Demos fair steadiness with STS. Increased effort and time performing STS from toilet. Stand to Sit  Assistance Level: Contact guard assist  Skilled Clinical Factors: Fair eccentric quad control noted when returning to chair. Pt displays good hand placement and safe technique. Environmental Mobility  Ambulation  Surface: Level surface  Device: Rolling walker  Distance: 150 feet x1  Activity: Within Unit  Activity Comments: Pt denies dizziness and SOB throughout. Additional Factors: Increased time to complete;Set-up; Verbal cues  Assistance Level: Contact guard assist;Stand by assist  Gait Deviations: Slow emmanuel;Decreased step length bilateral  Skilled Clinical Factors: Cues for upright posture and scanning environment during ambulation. No LOB noted. Neuromuscular Education  NDT Treatment: Gait ;Sitting;Standing      PT Exercises  A/AROM Exercises: Pt completed seated ankle pumps, LAQ, hip add/abd orange band x15 reps, marches x15  Pressure Relief Exercises: WS seated & glut sets, 10 reps  Postural Correction Exercises: upright posture  Breathing Techniques: pursed lip breathing      ASSESSMENT/PROGRESS TOWARDS GOALS  AM-PAC Inpatient Mobility Raw Score  19   AM-PAC Inpatient T-Scale Score  45.44   Mobility Inpatient CMS 0-100% Score 41.77   Mobility Inpatient CMS G-Code Modifier  CK     Assessment  Assessment: Patient with decreased aerobic capacity and limited by endurance.   Patient with decreased safety awareness and requries vc's for safety awareness from therapist for posture. Patient would benefit from continued skilled PT treatment to maximize return to PLOF. Activity Tolerance: Patient tolerated treatment well  Discharge Recommendations: Patient would benefit from continued therapy after discharge    Goals  Short Term Goals  Time Frame for Short Term Goals: 12 visits  Short Term Goal 1: Inc bed-mobility & transfers to independent to enable pt to safely get in/OOB & chair to return to PLOF & decrease risk for falls  Short Term Goal 2: Inc gait to amb 200ft or > indep w/ RW to enable pt to return to previous level of independence & able to demonstrate indep/ safe use of RW in functional activities including approaching surfaces and turning to sit  Short Term Goal 3: Inc strength to [facility] & seated & standing balance(with device) to good to facilitate pt independence for performance of ADL's & functional mobility, & reduce fall risk  Short Term Goal 4: Pt able to tolerate 30-40 min of activity to include 15-20 reps of ex, NMR & functional mobility with device to facilitate activity tolerance to [facility]  Short Term Goal 5: Ed pt on home ex's, safety, balance & endurance training, fall prevention, & issue written Pt Ed    PLAN OF CARE/SAFETY  Physcial Therapy Plan  General Plan: 5-7 times per week  Current Treatment Recommendations: Strengthening;Balance training;Functional mobility training;Transfer training;ADL/Self-care training; Endurance training;Gait training;Home exercise program;Safety education & training;Patient/Caregiver education & training;Neuromuscular re-education  Safety Devices  Type of Devices: Call light within reach; All fall risk precautions in place; Heels elevated for pressure relief;Nurse notified;Gait belt;Left in chair;Chair alarm in place    EDUCATION  Education  Education Given To: Patient  Education Provided: Safety; Mobility Training;Transfer Training  Education Method: Verbal  Barriers to Learning: None  Education Outcome: Verbalized understanding;Continued education needed        Therapy Time   Individual Concurrent Group Co-treatment   Time In 0910         Time Out 0948         Minutes [address], 11/27/22 at 12:12 PM

## 2022-11-27 NOTE — CARE COORDINATION
Social work: No cade yet nor discharge order. Pt does have auth and it is good for tomorrow. They would not be able to get all medis in if pt comes late tonight. Will tentatively set up for 10 am tomorrow if ok with  And RN. Will need cade,Rx and discharge order for snf.  [facility] in [place]

## 2022-11-27 NOTE — PROGRESS NOTES
New order received for amoxicillin. RN messaged Dr. Flores Comes regarding amoxicillin d/t allergy. Dr. Flores Comes states pharmacist recommended as a better choice than levaquin. RN informed patient of Amoxicillin ordered. Patient states he will not take d/t making him sick. RN informed Dr. Mark Rojas of patient refusing amoxicillin. New order received for levaquin as entered. RN also informed pharmacist as per Dr. Christine Penn instruction. 2130 Pharmacist calls back and states to give one time dose of levaquin and have ID review medication tomorrow.

## 2022-11-27 NOTE — CARE COORDINATION
Social work: spoke to Dr. Dwight Casanova he is going off service tonight and the other Dr. Loni Pennington take over he thinks pt may be ready to go to [place]. He suggested planning for after 3 pm.  Will start forms for transportation. Will need cade, Rx and discharge order for snf.  Katy newman

## 2022-11-27 NOTE — PROGRESS NOTES
Reason for Follow up: Acute on chronic kidney disease/fluid electrolyte management    Subjective:    Belle Hammond was seen and evaluated. He is able to ambulate. He currently is not wearing his lower extremity compression device and has residual edema of the markedly improved. He has had no further orthostatic symptoms or near falls but has been assisted with his ambulation. No worsening shortness of breath chest pain palpitations or difficulty voiding.   Currently off diuretics and off IVF    Review Of Systems: Otherwise as noted in HPI    Scheduled Meds:   warfarin  3 mg Oral Once    cefTRIAXone (ROCEPHIN) IV  2,000 mg IntraVENous Q24H    alogliptin  6.25 mg Oral Daily    mirtazapine  7.5 mg Oral Nightly    allopurinol  100 mg Oral Daily    carvedilol  6.25 mg Oral BID WC    cilostazol  100 mg Oral Daily    pravastatin  20 mg Oral Nightly    isosorbide mononitrate  30 mg Oral Daily    sodium chloride flush  5-40 mL IntraVENous 2 times per day    doxazosin  4 mg Oral Nightly    insulin lispro  0-8 Units SubCUTAneous TID WC    insulin lispro  0-4 Units SubCUTAneous Nightly    warfarin placeholder: dosing by pharmacy   Other RX Placeholder     Continuous Infusions:   sodium chloride      dextrose       PRN Meds:potassium chloride **OR** potassium alternative oral replacement **OR** potassium chloride, magnesium sulfate, sodium chloride flush, sodium chloride, ondansetron **OR** ondansetron, acetaminophen **OR** acetaminophen, glucose, dextrose bolus **OR** dextrose bolus, glucagon (rDNA), dextrose    Allergies   Allergen Reactions    Amoxicillin Diarrhea and Nausea And Vomiting    Bumetanide Nausea And Vomiting    Cephalexin Nausea And Vomiting    Omeprazole Nausea And Vomiting       Physical Exam:    Vitals:    11/26/22 1840 11/26/22 2050 11/27/22 0206 11/27/22 0756   BP: (!) 143/53   (!) 127/59   Pulse: (!) 26 66  53   Resp: 18   16   Temp: 97.7 °F (36.5 °C)   98.2 °F (36.8 °C)   TempSrc:       SpO2: 96%   94%   Weight: 161 lb 14.4 oz (73.4 kg)    Height:         I/O last 3 completed shifts: In: 300 [P.O.:300]  Out: -     PHYSICAL EXAM:  Rylie White is awake and alert in no acute distress. He is currently resting on room air. Neck veins are nondistended. He is in a rate controlled atrial fibrillation. His chest is diminished bilaterally but clear. He is a protuberant but nontender abdomen. Chronic lower extremity edema with 1-2+ pretibial edema and stasis dermatitis. No metabolic flap or gross motor deficit.     Data:    CBC:   Lab Results   Component Value Date    WBC 6.8 11/27/2022    HGB 9.8 (L) 11/27/2022    HCT 30.5 (L) 11/27/2022    MCV 95.9 11/27/2022    PLT 90 (L) 11/27/2022     BMP:    Lab Results   Component Value Date     11/27/2022     11/26/2022     11/25/2022    K 3.4 (L) 11/27/2022    K 3.1 (L) 11/26/2022    K 3.5 (L) 11/25/2022     11/27/2022     11/26/2022     11/25/2022    CO2 29 11/27/2022    CO2 29 11/26/2022    CO2 31 11/25/2022    BUN 49 (H) 11/27/2022    BUN 55 (H) 11/26/2022    BUN 63 (H) 11/25/2022    CREATININE 1.66 (H) 11/27/2022    CREATININE 1.69 (H) 11/26/2022    CREATININE 1.93 (H) 11/25/2022    GLUCOSE 181 (H) 11/27/2022    GLUCOSE 172 (H) 11/26/2022    GLUCOSE 188 (H) 11/25/2022     CMP:   Lab Results   Component Value Date/Time     11/27/2022 05:38 AM    K 3.4 11/27/2022 05:38 AM     11/27/2022 05:38 AM    CO2 29 11/27/2022 05:38 AM    BUN 49 11/27/2022 05:38 AM    CREATININE 1.66 11/27/2022 05:38 AM    GLUCOSE 181 11/27/2022 05:38 AM    CALCIUM 9.0 11/27/2022 05:38 AM    PROT 6.5 11/23/2022 06:45 AM    LABALBU 3.4 11/27/2022 05:38 AM    BILITOT 0.9 11/23/2022 06:45 AM    ALKPHOS 90 11/23/2022 06:45 AM    AST 11 11/23/2022 06:45 AM    ALT <5 11/23/2022 06:45 AM      Hepatic:   Lab Results   Component Value Date    AST 11 11/23/2022    ALT <5 (L) 11/23/2022    BILITOT 0.9 11/23/2022    ALKPHOS 90 11/23/2022     BNP: No results found for: BNP  Lipids: No results found for: CHOL, HDL  INR:   Lab Results   Component Value Date    INR 2.3 11/27/2022    INR 2.5 11/26/2022    INR 2.9 11/25/2022     PTH: No results found for: PTH  Phosphorus:    Lab Results   Component Value Date/Time    PHOS 2.6 11/27/2022 05:38 AM     Ionized Calcium: No results found for: IONCA  Magnesium:   Lab Results   Component Value Date/Time    MG 2.3 11/27/2022 05:38 AM     Albumin:   Lab Results   Component Value Date/Time    LABALBU 3.4 11/27/2022 05:38 AM         Radiology:  EXAMINATION:   ONE XRAY VIEW OF THE CHEST       11/22/2022 3:21 pm       COMPARISON:   12/09/2018       HISTORY:   ORDERING SYSTEM PROVIDED HISTORY: weak   TECHNOLOGIST PROVIDED HISTORY:   weak   Reason for Exam: fatigue   Additional signs and symptoms: fatigue and weak       FINDINGS:   Stable cardiomegaly with calcific plaque of the thoracic aorta. No signs of   pulmonary vascular congestion. Aside from mild chronic appearing changes   lungs appear grossly clear. Minimal subsegmental atelectasis/scarring left   lung base. No new focal lung consolidation. No large pleural effusions. Bones are osteopenic with degenerative changes. Old left rib fractures. Impression   No acute cardiopulmonary process suspected         Assessment:  Acute on CKD3B baseline SCr ~ 1.9. Overt low grade proteinuria  Sepsis/2 out of 2 blood cultures positive for Strep hominis: on Rocephin  DM2  HTN  HLD  Gout  CHF  Chronic Afib  Peripheral Neuropathy  Chronic multifactorial LE edema  Hypokalemia    Plan:  Off IV fluids  Will give KCl 40 meq x 1 today  Continue PT OT and supportive care  Follow up labs. Please do not hesitate to call with questions. Electronically signed by Donna Renee MD on 11/27/2022 at 4:43 PM  [facility].   Ph: [phone]

## 2022-11-27 NOTE — PLAN OF CARE
Problem: Discharge Planning  Goal: Discharge to home or other facility with appropriate resources  Outcome: Progressing     Problem: Musculoskeletal - Adult  Goal: Return mobility to safest level of function  Outcome: Progressing  Goal: Return ADL status to a safe level of function  Outcome: Progressing

## 2022-11-27 NOTE — PROGRESS NOTES
[facility]    Adult Hospitalist      Name: Elisa Garcia  MRN: [id number]     Acct: [de-identified]  Room: 2012/2012-02    Admit Date: 11/22/2022  2:40 PM  PCP: Elias Couch MD    Primary Problem  Principal Problem:    Dehydration  Active Problems:    Severe malnutrition ([address]  Resolved Problems:    * No resolved hospital problems. *        Assesment:     Bacteremia with Strep Viridans / Strep Mitis/ Strep Oralis and Staph Hominis  Fatigue improved  Anorexia improved  Acute kidney injury Creat improved from 2.3 to 1.6  Acute dehydration/weakness  Coronary artery disease, native vessel  Atrial fibrillation with controlled rate  History of deep vein thrombosis with pulmonary embolism  Cardiomyopathy  Chronic kidney disease stage III  Diabetes mellitus type 2  Diabetic neuropathy  Essential hypertension  Peripheral artery disease  Gastroesophageal reflux disease without esophagitis  Mixed hyperlipidemia  Gout  Major depressive disorder        Plan:     Admit to progressive  Monitor vitals closely  Keep SPO2 above 90%  I's and O's  IV fluids  Pain control  Antiemetics as needed  Urinary sodium  Recheck renal function  Resume essential home medication  Avoid nephrotoxic agents  CBC, BMP  Consult nephrology  Accu-Cheks before meals and at bedtime  Insulin sliding scale medium  Hypoglycemia protocol  Rocephin  Mertazipine  D/w RN  [facility]  Culture sensitivity shows response to penicillin, Levaquin-discussed with pharmacist.  His QT interval was prolonged. There is some history of tendon repair as well. Penicillin caused diarrhea and he tolerated Rocephin. We will switch to amoxicillin right now. Will switch to p.o. ID rec IV Rocephin   Await pre-CERT for SNF  ETT in the a.m.  DC plan once okay with all  DVT and GI prophylaxis.         Chief Complaint:     Chief Complaint   Patient presents with    Fatigue         History of Present Illness:      Elisa Garcia is a 80 y.o.  male who presents with Fatigue    Patient admitted through the emergency room where he presented with progressive weakness for last 2 days. And says he had been nauseous and vomited once today. Patient says he was not able to eat or drink much weakness continued to worsen after which she was brought to the emergency room patient says he does not understand why he was nauseous. He also says he had poor appetite for several days before that but he does not know why. Is having any fever or chills. Denies any respiratory infection, diarrhea or dysuria. Patient states he has not had a bowel movement for 3 days    Patient says his wife passed away in February of this year. He denies any depression at this time    Patient denies any chest pain, dyspnea or orthopnea. Denies headache, photophobia or diplopia. Denies neck pain or back pain. Denies dysuria    11/23- pt was able to sit up and ambulate w assistance. Still not having much appetite. Abx started since positive blood culture. Also noted depressin, tearful, low energy, low motivation. SSRI started. COde status discussed. Pt wishes to be [facility]    11/24- Pt feels more appetite. Sitting up in chair. Son at bedside. Mood better also. Await c/s. 2/2 B Cx positive. Pt now prepared to go to [facility]    11/25-patient improving every day. Appetite improving daily. Able to tolerate 70% meals. Ambulating more. Culture sensitivity awaited. Discharge planning to [facility]    11/26-patient showed significant improvement in appetite and mood after initiation of antibiotics. Recent culture and sensitivity reports show sensitive to Levaquin and penicillins. Also sensitive to Bactrim which we will not use because of his renal function. Renal function has improved from a creatinine of 2.3 down to 1.6. Await pre-CERT for SNF    51/26- ID recommend IV Rocephin. Patient plans to get ETT tomorrow.   Discharge planning after that      I have personally reviewed the past medical history, past surgical history, medications, social history, and family history, and summarized in the note. Review of Systems:     All 10 point system is reviewed and negative otherwise mentioned in HPI. Past Medical History:     Past Medical History:   Diagnosis Date    Achilles tendon pain 1980's    Repair, Had DVT post surgery    Anemia     Atrial fibrillation (HCC)     Cardiomyopathy ([address]     Cardiomyopathy ([address]     Cataract     CHF (congestive heart failure) (HCC)     Chronic kidney disease     Stage 3    COPD (chronic obstructive pulmonary disease) ([address]     Dental disease     Diabetes mellitus ([address]     Type 2     Diabetic neuropathy (HCC)     Bilateral Lower legs distal to knees    DVT (deep venous thrombosis) ([address] 1980's    With PE     Edema     Bilateral Legs    Entropion 06/2016    Right Lower Lid    Gout     Hearing loss     Hyperlipidemia     Hypertension     Ribs, multiple fractures     Per [facility] records        Past Surgical History:     Past Surgical History:   Procedure Laterality Date    ACHILLES TENDON SURGERY Left 1980's    Had DVT post surgery    COLONOSCOPY  1990's    EYE SURGERY Right 07/12/2016    Repair Entropion eye, Rt lower lid. Surgeon Harish Dickson at [facility]    [place]     2 Lt inguinal    LIPOMA RESECTION  1980's    TONSILLECTOMY  1940's        Medications Prior to Admission:       Prior to Admission medications    Medication Sig Start Date End Date Taking?  Authorizing Provider   isosorbide mononitrate (IMDUR) 60 MG extended release tablet Take 30 mg by mouth daily   Yes Historical Provider, MD   spironolactone (ALDACTONE) 25 MG tablet Take 12.5 mg by mouth daily   Yes Historical Provider, MD   darbepoetin calso-polysorbate (ARANESP) 40 MCG/0.4ML SOSY injection Inject 40 mcg as directed every 30 days   Yes Historical Provider, MD   warfarin (COUMADIN) 2 MG tablet Take 2 mg by mouth Twice a Week Monday  [address]   Yes Historical Provider, MD Cholecalciferol (VITAMIN D3) 1.25 MG (75295 UT) TABS Take 1 tablet by mouth every 30 days Last Friday of the month. Yes Historical Provider, MD   allopurinol (ZYLOPRIM) 100 MG tablet Take 100 mg by mouth daily     Historical Provider, MD   warfarin (COUMADIN) 2 MG tablet Take 4 mg by mouth Five times weekly Tuesday, Wednesday, Thursday,Saturday and . Historical Provider, MD   gabapentin (NEURONTIN) 300 MG capsule Take 300 mg by mouth 2 times daily     Historical Provider, MD   doxazosin (CARDURA) 4 MG tablet Take 4 mg by mouth at bedtime    Historical Provider, MD   carvedilol (COREG) 6.25 MG tablet Take 6.25 mg by mouth 2 times daily     Historical Provider, MD   furosemide (LASIX) 40 MG tablet Take 40 mg by mouth 2 times daily (before meals)    Historical Provider, MD   cilostazol (PLETAL) 100 MG tablet Take 100 mg by mouth daily    Historical Provider, MD   pravastatin (PRAVACHOL) 40 MG tablet Take 20 mg by mouth at bedtime    Historical Provider, MD        Allergies:       Amoxicillin, Bumetanide, Cephalexin, and Omeprazole    Social History:     Tobacco:    reports that he has never smoked. He has never used smokeless tobacco.  Alcohol:      reports current alcohol use of about 7.0 standard drinks per week. Drug Use:  reports no history of drug use. Family History:     History reviewed. No pertinent family history.       Physical Exam:     Vitals:  BP (!) 127/59   Pulse 53   Temp 98.2 °F (36.8 °C)   Resp 16   Ht 5' 11\" (1.803 m)   Wt 161 lb 14.4 oz (73.4 kg)   SpO2 94%   BMI 22.58 kg/m²   Temp (24hrs), Av °F (36.7 °C), Min:97.7 °F (36.5 °C), Max:98.2 °F (36.8 °C)      General appearance - alert, well appearing, and in no acute distress  Mental status - oriented to person, place, and time with normal affect  Head - normocephalic and atraumatic  Eyes - pupils equal and reactive, extraocular eye movements intact, conjunctiva clear  Ears - hearing appears to be intact  Nose - no drainage noted  Mouth - mucous membranes dry  Neck - supple, no carotid bruits, thyroid not palpable  Chest - clear to auscultation, normal effort  Heart - normal rate, regular rhythm, no murmur  Abdomen - soft, nontender, nondistended, bowel sounds present all four quadrants, no masses, hepatomegaly or splenomegaly  Neurological - normal speech, no focal findings or movement disorder noted, cranial nerves II through XII grossly intact  Extremities - peripheral pulses palpable, no pedal edema or calf pain with palpation. There is significant pigmentation. Bilateral lower extremity sensory neuropathy  Skin -bilateral lower extremity pigmentation. Data:     Labs:    Hematology:  Recent Labs     11/25/22  0656 11/26/22  0533 11/27/22  0538   WBC 6.7 6.9 6.8   RBC 3.51* 3.28* 3.18*   HGB 10.8* 10.0* 9.8*   HCT 34.1* 31.7* 30.5*   MCV 97.2 96.6 95.9   MCH 30.8 30.5 30.8   MCHC 31.7 31.5 32.1   RDW 14.6* 14.6* 14.6*   PLT 99* 95* 90*   MPV 9.9 9.8 9.4   INR 2.9 2.5 2.3       Chemistry:  Recent Labs     11/25/22  0656 11/26/22  0533 11/27/22  0538    142 141   K 3.5* 3.1* 3.4*    101 102   CO2 31 29 29   GLUCOSE 188* 172* 181*   BUN 63* 55* 49*   CREATININE 1.93* 1.69* 1.66*   MG 2.3 2.3 2.3   ANIONGAP 10 12 10   LABGLOM 32* 38* 38*   CALCIUM 9.5 9.2 9.0   PHOS 3.5 3.2 2.6       Recent Labs     11/25/22  0656 11/25/22  1112 11/26/22  0533 11/26/22  0625 11/26/22  1150 11/26/22  1625 11/26/22 2016 11/27/22  0538 11/27/22  0613 11/27/22  1129 11/27/22  1559   LABALBU 3.8  --  3.5  --   --   --   --  3.4*  --   --   --    POCGLU  --    < >  --    < > 308* 178* 215*  --  180* 273* 215*    < > = values in this interval not displayed.          Lab Results   Component Value Date    INR 2.3 11/27/2022    INR 2.5 11/26/2022    INR 2.9 11/25/2022    PROTIME 25.3 (H) 11/27/2022    PROTIME 27.1 (H) 11/26/2022    PROTIME 30.3 (H) 11/25/2022       Lab Results   Component Value Date/Time    SPECIAL RT AC,10ML 11/22/2022 05:35 PM     Lab Results   Component Value Date/Time    CULTURE NO GROWTH <24 HRS 11/27/2022 01:03 PM       No results found for: POCPH, PHART, PH, POCPCO2, PUW2HTP, PCO2, POCPO2, PO2ART, PO2, POCHCO3, GWZ9DOK, HCO3, NBEA, PBEA, BEART, BE, THGBART, THB, IDY1BWJ, JRNY7LDE, J5USFJPD, O2SAT, FIO2    Radiology:    CT HEAD WO CONTRAST    Result Date: 11/22/2022  No evidence for acute intracranial hemorrhage, territorial infarction or intracranial mass lesion. Mild chronic microangiopathic ischemic disease. Mild generalized volume loss. Complete opacification of right maxillary sinus, mild mucosal thickening of the left maxillary sinus. Moderate mucosal thickening of the ethmoidal air cells. Polypoid mucosal thickening within the right side of the nasopharynx. XR CHEST PORTABLE    Result Date: 11/22/2022  No acute cardiopulmonary process suspected         All radiological studies reviewed                Code Status:  DNR-CCA    Electronically signed by Enrique Whitfield MD on 11/27/2022 at 4:28 PM     Copy sent to Dr. Elias Couch MD    This note was created with the assistance of a speech-recognition program.  Although the intention is to generate a document that actually reflects the content of the visit, no guarantees can be provided that every mistake has been identified and corrected by editing. Note was updated later by me after  physical examination and  completion of the assessment.

## 2022-11-27 NOTE — PLAN OF CARE
Problem: Discharge Planning  Goal: Discharge to home or other facility with appropriate resources  11/27/2022 1414 by Cristian Rizo RN  Outcome: Progressing  11/27/2022 1137 by Cristian Rizo RN  Outcome: Progressing     Problem: Musculoskeletal - Adult  Goal: Return mobility to safest level of function  11/27/2022 1414 by Cristian Rizo RN  Outcome: Progressing  11/27/2022 1137 by Cristian Rizo RN  Outcome: Progressing  Goal: Return ADL status to a safe level of function  11/27/2022 1414 by Cristian Rizo RN  Outcome: Progressing  11/27/2022 1137 by Cristian Rizo RN  Outcome: Progressing

## 2022-11-27 NOTE — CONSULTS
[facility] -   Infectious diseases evaluation  admission date 11/22/2022    reason for consultation:   Bacteremia    Impression :   Current:  Streptococcus viridans bacteremia, possible dental source. Coagulase-negative staph positive blood culture could be contamination. Acute renal failure improved  Dehydration  Atrial fibrillation  Coronary artery disease  Cardiomyopathy  History of DVT and pulmonary embolism  Diabetes mellitus  Peripheral vascular disease  GI intolerance to ampicillin and cephalexin    Recommendations   Discontinue oral Levaquin  IV ceftriaxone  Echocardiogram  Repeat blood cultures  Follow CBC renal function  Supportive care    Infection Control Recommendations   [place] Precautions      Antimicrobial Stewardship Recommendations   Simplification of therapy      History of Present Illness:   Initial history:  Regla Bella is a 80y.o.-year-old male presented to hospital with generalized weakness for 2 days prior to admission associated with 1 episode of vomiting. Symptoms moderate, no alleviating or aggravating factors. He denied fever or chills, denied pain, no urinary symptoms, no cough or shortness of breath, no diarrhea, no other complaints. He had history of cardiomyopathy, no pacemaker in place, no history of joint placement or other hardware placement. The patient is wearing denture, had tooth extraction around 3-month ago ,denied oral pain. Blood cultures on 11/22/2022   2 of 2 grew Streptococcus viridans from different sites, 5 minutes apart also grew coagulase negative staph  Initial work-up showed normal WBC, creatinine 2.3.   Interval changes  11/27/2022   Patient Vitals for the past 8 hrs:   BP Temp Pulse Resp SpO2   11/27/22 0756 (!) 127/59 98.2 °F (36.8 °C) 53 16 94 %         I have personally reviewed the past medical history, past surgical history, medications, social history, and family history, and I haveupdated the database accordingly. Allergies:   Amoxicillin, Bumetanide, Cephalexin, and Omeprazole     Review of Systems:     Review of Systems  As per history present illness, other than above 12 system review was negative  Physical Examination :       Physical Exam     Appearance: He is well-developed. He is not diaphoretic. HENT:      Head: Normocephalic and atraumatic. Eyes:      General: No scleral icterus. Right eye: No discharge. Left eye: No discharge. Cardiovascular:      Rate and Rhythm: Normal rate. Rhythm irregular. Pulmonary:      Effort: Pulmonary effort is normal. No respiratory distress. Breath sounds: Normal breath sounds. No stridor. No wheezing or rales. Abdominal:      General: There is no distension. Palpations: Abdomen is soft. Tenderness: There is no abdominal tenderness. Musculoskeletal:         General: Normal range of motion. Cervical back: Neck supple. Lymphadenopathy:      Cervical: No cervical adenopathy. Skin:     General: Skin is warm and dry. Findings: No erythema or rash. Neurological:      Mental Status: He is alert and oriented to person, place, and time.    Psychiatric:         Behavior: Behavior normal.   Past Medical History:     Past Medical History:   Diagnosis Date    Achilles tendon pain 1980's    Repair, Had DVT post surgery    Anemia     Atrial fibrillation (HCC)     Cardiomyopathy ([address]     Cardiomyopathy ([address]     Cataract     CHF (congestive heart failure) ([facility])     Chronic kidney disease     Stage 3    COPD (chronic obstructive pulmonary disease) ([address]     Dental disease     Diabetes mellitus ([address]     Type 2     Diabetic neuropathy ([facility])     Bilateral Lower legs distal to knees    DVT (deep venous thrombosis) ([address] 1980's    With PE     Edema     Bilateral Legs    Entropion 06/2016    Right Lower Lid    Gout     Hearing loss     Hyperlipidemia     Hypertension     Ribs, multiple fractures     Per [facility] records Past Surgical  History:     Past Surgical History:   Procedure Laterality Date    ACHILLES TENDON SURGERY Left 1980's    Had DVT post surgery    COLONOSCOPY  1990's    EYE SURGERY Right 07/12/2016    Repair Entropion eye, Rt lower lid. Surgeon Jagjit La at [facility]    [place]     2 Lt inguinal    LIPOMA RESECTION  1980's    TONSILLECTOMY  1940's       Medications:      warfarin  3 mg Oral Once    levoFLOXacin  250 mg Oral Daily    mirtazapine  7.5 mg Oral Nightly    allopurinol  100 mg Oral Daily    carvedilol  6.25 mg Oral BID WC    cilostazol  100 mg Oral Daily    pravastatin  20 mg Oral Nightly    isosorbide mononitrate  30 mg Oral Daily    sodium chloride flush  5-40 mL IntraVENous 2 times per day    doxazosin  4 mg Oral Nightly    insulin lispro  0-8 Units SubCUTAneous TID WC    insulin lispro  0-4 Units SubCUTAneous Nightly    warfarin placeholder: dosing by pharmacy   Other RX Placeholder       Social History:     Social History     Socioeconomic History    Marital status:      Spouse name: Not on file    Number of children: Not on file    Years of education: Not on file    Highest education level: Not on file   Occupational History    Not on file   Tobacco Use    Smoking status: Never    Smokeless tobacco: Never   Vaping Use    Vaping Use: Never used   Substance and Sexual Activity    Alcohol use: Yes     Alcohol/week: 7.0 standard drinks     Types: 1 Cans of beer, 6 Shots of liquor per week    Drug use: No    Sexual activity: Never   Other Topics Concern    Not on file   Social History Narrative    Not on file     Social Determinants of Health     Financial Resource Strain: Not on file   Food Insecurity: Not on file   Transportation Needs: Not on file   Physical Activity: Not on file   Stress: Not on file   Social Connections: Not on file   Intimate Partner Violence: Not on file   Housing Stability: Not on file       Family History:   History reviewed. No pertinent family history. Medical Decision Making:   I have independently reviewed/ordered the following labs:    CBC with Differential:   Recent Labs     11/26/22 0533 11/27/22 0538   WBC 6.9 6.8   HGB 10.0* 9.8*   HCT 31.7* 30.5*   PLT 95* 90*   LYMPHOPCT 13* 13*   MONOPCT 10 10     BMP:  Recent Labs     11/26/22 0533 11/27/22 0538    141   K 3.1* 3.4*    102   CO2 29 29   BUN 55* 49*   CREATININE 1.69* 1.66*   MG 2.3 2.3     Hepatic Function Panel:   Recent Labs     11/26/22 0533 11/27/22 0538   LABALBU 3.5 3.4*     No results for input(s): RPR in the last 72 hours. No results for input(s): HIV in the last 72 hours. No results for input(s): BC in the last 72 hours. Lab Results   Component Value Date/Time    CREATININE 1.66 11/27/2022 05:38 AM    GLUCOSE 181 11/27/2022 05:38 AM       Detailed results: Thank you for allowing us to participate in the care of this patient. Please call with questions. This note is created with the assistance of a speech recognition program.  While intending to generate adocument that actually reflects the content of the visit, the document can still have some errors including those of syntax and sound a like substitutions which may escape proof reading. It such instances, actual meaningcan be extrapolated by contextual diversion.     Ernestine Grant MD  Office: [phone]  Perfect serve / office [phone]

## 2022-11-28 LAB
ABSOLUTE EOS #: 0.28 K/UL (ref 0–0.4)
ABSOLUTE IMMATURE GRANULOCYTE: 0.07 K/UL (ref 0–0.3)
ABSOLUTE LYMPH #: 0.71 K/UL (ref 1–4.8)
ABSOLUTE MONO #: 0.71 K/UL (ref 0.2–0.8)
ALBUMIN SERPL-MCNC: 3.3 G/DL (ref 3.5–5.2)
ANION GAP SERPL CALCULATED.3IONS-SCNC: 11 MMOL/L (ref 9–17)
BASOPHILS # BLD: 1 %
BASOPHILS ABSOLUTE: 0.07 K/UL (ref 0–0.2)
BUN BLDV-MCNC: 49 MG/DL (ref 8–23)
BUN/CREAT BLD: 27 (ref 9–20)
CALCIUM SERPL-MCNC: 8.9 MG/DL (ref 8.6–10.4)
CHLORIDE BLD-SCNC: 104 MMOL/L (ref 98–107)
CO2: 26 MMOL/L (ref 20–31)
CREAT SERPL-MCNC: 1.8 MG/DL (ref 0.7–1.2)
EOSINOPHILS RELATIVE PERCENT: 4 % (ref 1–4)
GFR SERPL CREATININE-BSD FRML MDRD: 35 ML/MIN/1.73M2
GLUCOSE BLD-MCNC: 158 MG/DL (ref 75–110)
GLUCOSE BLD-MCNC: 166 MG/DL (ref 75–110)
GLUCOSE BLD-MCNC: 167 MG/DL (ref 75–110)
GLUCOSE BLD-MCNC: 172 MG/DL (ref 75–110)
GLUCOSE BLD-MCNC: 176 MG/DL (ref 70–99)
GLUCOSE BLD-MCNC: 274 MG/DL (ref 75–110)
HCT VFR BLD CALC: 29.7 % (ref 40.7–50.3)
HEMOGLOBIN: 9.3 G/DL (ref 13–17)
IMMATURE GRANULOCYTES: 1 %
INR BLD: 2.4
LYMPHOCYTES # BLD: 10 % (ref 24–44)
MAGNESIUM: 2.4 MG/DL (ref 1.6–2.6)
MCH RBC QN AUTO: 30.6 PG (ref 25.2–33.5)
MCHC RBC AUTO-ENTMCNC: 31.3 G/DL (ref 28.4–34.8)
MCV RBC AUTO: 97.7 FL (ref 82.6–102.9)
MONOCYTES # BLD: 10 % (ref 1–7)
NRBC AUTOMATED: 0 PER 100 WBC
PDW BLD-RTO: 14.7 % (ref 11.8–14.4)
PHOSPHORUS: 2.5 MG/DL (ref 2.5–4.5)
PLATELET # BLD: 111 K/UL (ref 138–453)
PMV BLD AUTO: 10.1 FL (ref 8.1–13.5)
POTASSIUM SERPL-SCNC: 3.8 MMOL/L (ref 3.7–5.3)
PROTHROMBIN TIME: 26.3 SEC (ref 11.5–14.2)
RBC # BLD: 3.04 M/UL (ref 4.21–5.77)
SEG NEUTROPHILS: 74 % (ref 36–66)
SEGMENTED NEUTROPHILS ABSOLUTE COUNT: 5.26 K/UL (ref 1.8–7.7)
SODIUM BLD-SCNC: 141 MMOL/L (ref 135–144)
WBC # BLD: 7.1 K/UL (ref 3.5–11.3)

## 2022-11-28 PROCEDURE — 2580000003 HC RX 258: Performed by: INTERNAL MEDICINE

## 2022-11-28 PROCEDURE — 97535 SELF CARE MNGMENT TRAINING: CPT

## 2022-11-28 PROCEDURE — 93306 TTE W/DOPPLER COMPLETE: CPT

## 2022-11-28 PROCEDURE — 82947 ASSAY GLUCOSE BLOOD QUANT: CPT

## 2022-11-28 PROCEDURE — 97530 THERAPEUTIC ACTIVITIES: CPT

## 2022-11-28 PROCEDURE — 2580000003 HC RX 258: Performed by: FAMILY MEDICINE

## 2022-11-28 PROCEDURE — 85025 COMPLETE CBC W/AUTO DIFF WBC: CPT

## 2022-11-28 PROCEDURE — 85610 PROTHROMBIN TIME: CPT

## 2022-11-28 PROCEDURE — 6360000002 HC RX W HCPCS: Performed by: INTERNAL MEDICINE

## 2022-11-28 PROCEDURE — 99233 SBSQ HOSP IP/OBS HIGH 50: CPT | Performed by: INTERNAL MEDICINE

## 2022-11-28 PROCEDURE — 83735 ASSAY OF MAGNESIUM: CPT

## 2022-11-28 PROCEDURE — 1200000000 HC SEMI PRIVATE

## 2022-11-28 PROCEDURE — 6370000000 HC RX 637 (ALT 250 FOR IP): Performed by: FAMILY MEDICINE

## 2022-11-28 PROCEDURE — 36415 COLL VENOUS BLD VENIPUNCTURE: CPT

## 2022-11-28 PROCEDURE — 80069 RENAL FUNCTION PANEL: CPT

## 2022-11-28 RX ORDER — WARFARIN SODIUM 2 MG/1
2 TABLET ORAL
Status: COMPLETED | OUTPATIENT
Start: 2022-11-28 | End: 2022-11-28

## 2022-11-28 RX ADMIN — MIRTAZAPINE 7.5 MG: 7.5 TABLET ORAL at 22:17

## 2022-11-28 RX ADMIN — SODIUM CHLORIDE, PRESERVATIVE FREE 10 ML: 5 INJECTION INTRAVENOUS at 22:17

## 2022-11-28 RX ADMIN — INSULIN LISPRO 4 UNITS: 100 INJECTION, SOLUTION INTRAVENOUS; SUBCUTANEOUS at 17:13

## 2022-11-28 RX ADMIN — ISOSORBIDE MONONITRATE 30 MG: 30 TABLET, EXTENDED RELEASE ORAL at 11:24

## 2022-11-28 RX ADMIN — DOXAZOSIN 4 MG: 4 TABLET ORAL at 22:17

## 2022-11-28 RX ADMIN — PRAVASTATIN SODIUM 20 MG: 20 TABLET ORAL at 22:17

## 2022-11-28 RX ADMIN — ALLOPURINOL 100 MG: 100 TABLET ORAL at 11:24

## 2022-11-28 RX ADMIN — CILOSTAZOL 100 MG: 100 TABLET ORAL at 11:24

## 2022-11-28 RX ADMIN — WARFARIN SODIUM 2 MG: 2 TABLET ORAL at 17:13

## 2022-11-28 RX ADMIN — SODIUM CHLORIDE, PRESERVATIVE FREE 10 ML: 5 INJECTION INTRAVENOUS at 11:24

## 2022-11-28 RX ADMIN — CEFTRIAXONE 2000 MG: 2 INJECTION, POWDER, FOR SOLUTION INTRAMUSCULAR; INTRAVENOUS at 13:03

## 2022-11-28 RX ADMIN — CARVEDILOL 6.25 MG: 6.25 TABLET, FILM COATED ORAL at 17:13

## 2022-11-28 RX ADMIN — ALOGLIPTIN 6.25 MG: 6.25 TABLET, FILM COATED ORAL at 11:24

## 2022-11-28 RX ADMIN — CARVEDILOL 6.25 MG: 6.25 TABLET, FILM COATED ORAL at 11:24

## 2022-11-28 ASSESSMENT — ENCOUNTER SYMPTOMS
GASTROINTESTINAL NEGATIVE: 1
RESPIRATORY NEGATIVE: 1

## 2022-11-28 NOTE — PLAN OF CARE
Problem: Discharge Planning  Goal: Discharge to home or other facility with appropriate resources  11/28/2022 0932 by Darlin Ocampo RN  Outcome: Progressing  11/28/2022 0020 by Aletha Davis RN  Outcome: Progressing     Problem: Pain  Goal: Verbalizes/displays adequate comfort level or baseline comfort level  11/28/2022 0932 by Darlin Ocampo RN  Outcome: Progressing  11/28/2022 0020 by Aletha Davis RN  Outcome: Progressing  Flowsheets (Taken 11/28/2022 0020)  Verbalizes/displays adequate comfort level or baseline comfort level:   Encourage patient to monitor pain and request assistance   Administer analgesics based on type and severity of pain and evaluate response   Consider cultural and social influences on pain and pain management   Assess pain using appropriate pain scale   Implement non-pharmacological measures as appropriate and evaluate response     Problem: Safety - Adult  Goal: Free from fall injury  11/28/2022 0932 by Darlin Ocampo RN  Outcome: Progressing  11/28/2022 0020 by Aletha Davis RN  Outcome: Progressing     Problem: ABCDS Injury Assessment  Goal: Absence of physical injury  11/28/2022 0932 by Darlin Ocampo RN  Outcome: Progressing  11/28/2022 0020 by Aletha Davis RN  Outcome: Progressing     Problem: Chronic Conditions and Co-morbidities  Goal: Patient's chronic conditions and co-morbidity symptoms are monitored and maintained or improved  11/28/2022 0932 by Darlin Ocampo RN  Outcome: Progressing  11/28/2022 0020 by Aletha Davis RN  Outcome: Progressing  Flowsheets (Taken 11/28/2022 0020)  Care Plan - Patient's Chronic Conditions and Co-Morbidity Symptoms are Monitored and Maintained or Improved:   Collaborate with multidisciplinary team to address chronic and comorbid conditions and prevent exacerbation or deterioration   Update acute care plan with appropriate goals if chronic or comorbid symptoms are exacerbated and prevent overall improvement and discharge   Monitor and assess patient's chronic conditions and comorbid symptoms for stability, deterioration, or improvement     Problem: Nutrition Deficit:  Goal: Optimize nutritional status  11/28/2022 0932 by Dede Mckeon RN  Outcome: Progressing  11/28/2022 0020 by Vicky Stewart RN  Outcome: Progressing     Problem: Neurosensory - Adult  Goal: Achieves maximal functionality and self care  11/28/2022 0932 by Dede Mckeon RN  Outcome: Progressing  11/28/2022 0020 by Vicky Stewart RN  Outcome: Progressing     Problem: Skin/Tissue Integrity - Adult  Goal: Skin integrity remains intact  11/28/2022 0932 by Dede Mckeon RN  Outcome: Progressing  11/28/2022 0020 by Vicky Stewart RN  Outcome: Progressing  Flowsheets (Taken 11/27/2022 1918)  Skin Integrity Remains Intact: Monitor for areas of redness and/or skin breakdown  Goal: Incisions, wounds, or drain sites healing without S/S of infection  11/28/2022 0932 by Dede Mckeon RN  Outcome: Progressing  11/28/2022 0020 by Vicky Stewart RN  Outcome: Progressing  Flowsheets (Taken 11/27/2022 1918)  Incisions, Wounds, or Drain Sites Healing Without Sign and Symptoms of Infection: TWICE DAILY: Assess and document skin integrity     Problem: Musculoskeletal - Adult  Goal: Return mobility to safest level of function  11/28/2022 0932 by Dede Mckeon RN  Outcome: Progressing  11/28/2022 0020 by Vicky Stewart RN  Outcome: Progressing  Goal: Return ADL status to a safe level of function  11/28/2022 0932 by Dede Mckeon RN  Outcome: Progressing  11/28/2022 0020 by Vicky Stewart RN  Outcome: Progressing     Problem: Metabolic/Fluid and Electrolytes - Adult  Goal: Electrolytes maintained within normal limits  11/28/2022 0932 by Dede Mckeon RN  Outcome: Progressing  11/28/2022 0020 by Vikcy Stewart RN  Outcome: Progressing  Goal: Hemodynamic stability and optimal renal function maintained  11/28/2022 0932 by Altaf Mcintyre RN  Outcome: Progressing  11/28/2022 0020 by Lilli Klinefelter, RN  Outcome: Progressing  Goal: Glucose maintained within prescribed range  11/28/2022 0932 by Altaf Mcintyre RN  Outcome: Progressing  11/28/2022 0020 by Lilli Klinefelter, RN  Outcome: Progressing

## 2022-11-28 NOTE — PROGRESS NOTES
Warfarin Dosing - Pharmacy Consult Note  Consulting Provider: Dr. Demarco Lane  Indication:  History of  DVT/PE and Atrial Fibrillation  Warfarin Dose prior to admission: 2 mg Mon/Fri and 4 mg all other days   Concurrent anticoagulants/antiplatelets: cilostazol  Significant Drug Interactions: levofloxacin   -Home medications- pravastatin, allopurinol    -mirtazapine started 11/23  Recent Labs     11/26/22  0533 11/27/22  0538 11/28/22  0626   INR 2.5 2.3 2.4   HGB 10.0* 9.8* 9.3*   PLT 95* 90* 111*   LABALBU 3.5 3.4* 3.3*     Recent warfarin administrations                     warfarin (COUMADIN) tablet 3 mg (mg) 3 mg Given 11/27/22 1754    warfarin (COUMADIN) tablet 4 mg (mg) 4 mg Given 11/26/22 1703    warfarin (COUMADIN) tablet 2 mg (mg) 2 mg Given 11/25/22 1734                   Date   INR    Dose  11/22     2.6        4 mg   11/23     2.6        4 mg  11/24     3.3        -0-  11/25     2.9        2 mg  11/26     2.5        4 mg  11/27     2.3        3 mg  11/28     2.4            Assessment/Plan  (Goal INR: 2 - 3)  Coumadin 2 mg today. INR in am.     Active problem list reviewed. INR orders are placed. Chart reviewed for pertinent labs, drug/diet interactions, and past doses. Documentation of patient's clinical condition was reviewed. Pharmacy Dosing:  Pharmacy will continue to follow.

## 2022-11-28 NOTE — PROGRESS NOTES
Nephrology Progress Note        Subjective:  The patient is feeling well, he denies pain, no shortness of breath, he is on room air, no nausea, vomiting or diarrhea, blood pressure is a stable, afebrile    Objective:  CURRENT TEMPERATURE:  Temp: 97.3 °F (36.3 °C)  MAXIMUM TEMPERATURE OVER 24HRS:  Temp (24hrs), Av °F (36.7 °C), Min:97.3 °F (36.3 °C), Max:98.6 °F (37 °C)    CURRENT RESPIRATORY RATE:  Resp: 16  CURRENT PULSE:  Heart Rate: 66  CURRENT BLOOD PRESSURE:  BP: (!) 126/57  24HR BLOOD PRESSURE RANGE:  Systolic (30TRU), UYL:424 , Min:119 , IYZ:511   ; Diastolic (16CGF), ZGQ:45, Min:39, Max:57    24HR INTAKE/OUTPUT:    Intake/Output Summary (Last 24 hours) at 2022 0953  Last data filed at 2022 0931  Gross per 24 hour   Intake 49.24 ml   Output --   Net 49.24 ml     Weight   Wt Readings from Last 3 Encounters:   22 168 lb (76.2 kg)   18 199 lb (90.3 kg)       Physical Exam:  Awake, alert, in no acute distress  Skin: warm and dry, no rash or erythema  Pulmonary: clear to auscultation bilaterally- no wheezes, rales or rhonchi, normal air movement, no respiratory distress  Cardiovascular: Normal S1 & S2  Abdomen: soft nontender, bowel sounds present, no organomegaly,  no ascites  Extremities: no cyanosis, clubbing or edema    Current Medications:    warfarin (COUMADIN) tablet 2 mg, Once  cefTRIAXone (ROCEPHIN) 2,000 mg in dextrose 5 % 50 mL IVPB mini-bag, Q24H  alogliptin (NESINA) tablet 6.25 mg, Daily  potassium chloride (KLOR-CON M) extended release tablet 40 mEq, PRN   Or  potassium bicarb-citric acid (EFFER-K) effervescent tablet 40 mEq, PRN   Or  potassium chloride 10 mEq/100 mL IVPB (Peripheral Line), PRN  magnesium sulfate 2000 mg in 50 mL IVPB premix, PRN  mirtazapine (REMERON) tablet 7.5 mg, Nightly  allopurinol (ZYLOPRIM) tablet 100 mg, Daily  carvedilol (COREG) tablet 6.25 mg, BID WC  cilostazol (PLETAL) tablet 100 mg, Daily  pravastatin (PRAVACHOL) tablet 20 mg, Nightly  isosorbide mononitrate (IMDUR) extended release tablet 30 mg, Daily  sodium chloride flush 0.9 % injection 5-40 mL, 2 times per day  sodium chloride flush 0.9 % injection 5-40 mL, PRN  0.9 % sodium chloride infusion, PRN  ondansetron (ZOFRAN-ODT) disintegrating tablet 4 mg, Q8H PRN   Or  ondansetron (ZOFRAN) injection 4 mg, Q6H PRN  acetaminophen (TYLENOL) tablet 650 mg, Q6H PRN   Or  acetaminophen (TYLENOL) suppository 650 mg, Q6H PRN  doxazosin (CARDURA) tablet 4 mg, Nightly  glucose chewable tablet 16 g, PRN  dextrose bolus 10% 125 mL, PRN   Or  dextrose bolus 10% 250 mL, PRN  glucagon (rDNA) injection 1 mg, PRN  dextrose 10 % infusion, Continuous PRN  insulin lispro (HUMALOG) injection vial 0-8 Units, TID WC  insulin lispro (HUMALOG) injection vial 0-4 Units, Nightly  warfarin placeholder: dosing by pharmacy, RX Placeholder        Labs:    CBC:  Recent Labs     11/26/22 0533 11/27/22 0538 11/28/22  0626   WBC 6.9 6.8 7.1   RBC 3.28* 3.18* 3.04*   HGB 10.0* 9.8* 9.3*   HCT 31.7* 30.5* 29.7*   MCV 96.6 95.9 97.7   MCH 30.5 30.8 30.6   MCHC 31.5 32.1 31.3   RDW 14.6* 14.6* 14.7*   PLT 95* 90* 111*   MPV 9.8 9.4 10.1     Chemistry:  Recent Labs     11/26/22 0533 11/27/22  0538 11/28/22  0626    141 141   K 3.1* 3.4* 3.8    102 104   CO2 29 29 26   LABALBU 3.5 3.4* 3.3*   GLUCOSE 172* 181* 176*   BUN 55* 49* 49*   CREATININE 1.69* 1.66* 1.80*   MG 2.3 2.3 2.4   ANIONGAP 12 10 11   LABGLOM 38* 38* 35*   CALCIUM 9.2 9.0 8.9   PHOS 3.2 2.6 2.5     Recent Labs     11/26/22  0533 11/27/22  0538 11/28/22  0626   LABALBU 3.5 3.4* 3.3*       Phosphorus:    Lab Results   Component Value Date/Time    PHOS 2.5 11/28/2022 06:26 AM     Magnesium:   Lab Results   Component Value Date/Time    MG 2.4 11/28/2022 06:26 AM     Albumin:   Lab Results   Component Value Date/Time    LABALBU 3.3 11/28/2022 06:26 AM       Radiology:  CT HEAD WO CONTRAST    Result Date: 11/22/2022  EXAMINATION: CT OF THE HEAD WITHOUT CONTRAST  11/22/2022 3:28 pm TECHNIQUE: CT of the head was performed without the administration of intravenous contrast. Automated exposure control, iterative reconstruction, and/or weight based adjustment of the mA/kV was utilized to reduce the radiation dose to as low as reasonably achievable. COMPARISON: None. HISTORY: ORDERING SYSTEM PROVIDED HISTORY: fall TECHNOLOGIST PROVIDED HISTORY: fall Decision Support Exception - unselect if not a suspected or confirmed emergency medical condition->Emergency Medical Condition (MA) Reason for Exam: Pt states felt weak today, so laid down on floor and unable to get up. No reported head injury. Pt on anticoagulants. FINDINGS: There is no acute infarction, intracranial hemorrhage or intracranial mass lesion. No mass effect, midline shift or extra-axial collection is noted. There are mild nonspecific foci of periventricular and subcortical cerebral white matter hypodensity, most likely representing chronic microangiopathic disease in this age group. The brain parenchyma is otherwise normal. The cerebellar tonsils are in normal position. The ventricles, sulci, and cisterns are mildly prominent suggestive of mild generalized volume loss. The globes and orbits are within normal limits. Complete opacification of right maxillary sinus, mild mucosal thickening of the left maxillary sinus. Moderate mucosal thickening of the ethmoidal air cells. Polypoid mucosal thickening within the right side of the nasopharynx. The visualized extracranial structures including paranasal sinuses and mastoid air cells are otherwise unremarkable. No fracture is identified. No evidence for acute intracranial hemorrhage, territorial infarction or intracranial mass lesion. Mild chronic microangiopathic ischemic disease. Mild generalized volume loss. Complete opacification of right maxillary sinus, mild mucosal thickening of the left maxillary sinus.  Moderate mucosal thickening of the ethmoidal air cells. Polypoid mucosal thickening within the right side of the nasopharynx. XR CHEST PORTABLE    Result Date: 11/22/2022  EXAMINATION: ONE XRAY VIEW OF THE CHEST 11/22/2022 3:21 pm COMPARISON: 12/09/2018 HISTORY: ORDERING SYSTEM PROVIDED HISTORY: weak TECHNOLOGIST PROVIDED HISTORY: weak Reason for Exam: fatigue Additional signs and symptoms: fatigue and weak FINDINGS: Stable cardiomegaly with calcific plaque of the thoracic aorta. No signs of pulmonary vascular congestion. Aside from mild chronic appearing changes lungs appear grossly clear. Minimal subsegmental atelectasis/scarring left lung base. No new focal lung consolidation. No large pleural effusions. Bones are osteopenic with degenerative changes. Old left rib fractures. No acute cardiopulmonary process suspected       Assessment:  1. DELROY on CKD stage III with a baseline creatinine of 1.9 mg/dL, under the care of Dr. Suly Chanel secondary to prerenal azotemia, nonoliguric, improving  2. Hypokalemia, GI loss, replaced  3. Diarrhea, resolved  4. Presented for fall  5. Strep viridans bacteremia, possible dental source  6. History of DVT/PE  7. Anemia  8. History of GI bleed  9. A. fib  10. Diabetes mellitus type 2    Plan:  1. Continue present therapy   2. Avoid nephrotoxins  3. Replace electrolytes as needed        Please do not hesitate to call with questions.     Electronically signed by Foreign Roberson MD on 11/28/2022 at 9:53 AM

## 2022-11-28 NOTE — PROGRESS NOTES
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  Acute renal failure on CKD stage III  Chronic atrial fibrillation-warfarin  Cardiomyopathy  History of DVT/PE  Hypertension  Hyperlipidemia  Diabetes type 2  Peripheral arterial disease          Plan:        Patient is on MedSurg floor  Demented oxygen saturation greater than 92%    Antibiotics as below/IV ceftriaxone  ID on board    Monitor creatinine  IV fluids discontinued  Nephrology on board      DVT and GI prophylaxis  Continue to monitor/telemetry/CBC with differential daily/BMP daily  Continue medications as below    Scheduled Meds:   warfarin  2 mg Oral Once    cefTRIAXone (ROCEPHIN) IV  2,000 mg IntraVENous Q24H    alogliptin  6.25 mg Oral Daily    mirtazapine  7.5 mg Oral Nightly    allopurinol  100 mg Oral Daily    carvedilol  6.25 mg Oral BID WC    cilostazol  100 mg Oral Daily    pravastatin  20 mg Oral Nightly    isosorbide mononitrate  30 mg Oral Daily    sodium chloride flush  5-40 mL IntraVENous 2 times per day    doxazosin  4 mg Oral Nightly    insulin lispro  0-8 Units SubCUTAneous TID WC    insulin lispro  0-4 Units SubCUTAneous Nightly    warfarin placeholder: dosing by pharmacy   Other RX Placeholder     Continuous Infusions:   sodium chloride      dextrose       PRN Meds:  potassium chloride, 40 mEq, PRN   Or  potassium alternative oral replacement, 40 mEq, PRN   Or  potassium chloride, 10 mEq, PRN  magnesium sulfate, 2,000 mg, PRN  sodium chloride flush, 5-40 mL, PRN  sodium chloride, , PRN  ondansetron, 4 mg, Q8H PRN   Or  ondansetron, 4 mg, Q6H PRN  acetaminophen, 650 mg, Q6H PRN Or  acetaminophen, 650 mg, Q6H PRN  glucose, 4 tablet, PRN  dextrose bolus, 125 mL, PRN   Or  dextrose bolus, 250 mL, PRN  glucagon (rDNA), 1 mg, PRN  dextrose, , Continuous PRN            Subjective:     Patient seen and examined at bedside. No overnight events. No acute complaints today. Afebrile  Pt. Denies any CP, SOB, palpitation, HA, dizziness, chills, cough, cold, changes in urination, BM or skin changes or any pain. ROS:  A 10 point system reviewed and negative otherwise mentioned above. Physical Examination:      Vitals:  BP (!) 126/57   Pulse 66   Temp 97.3 °F (36.3 °C) (Axillary)   Resp 16   Ht 5' 11\" (1.803 m)   Wt 168 lb (76.2 kg)   SpO2 95%   BMI 23.43 kg/m²   Temp (24hrs), Av °F (36.7 °C), Min:97.3 °F (36.3 °C), Max:98.6 °F (37 °C)    Weight:   Wt Readings from Last 3 Encounters:   22 168 lb (76.2 kg)   18 199 lb (90.3 kg)     I/O last 3 completed shifts:  I/O last 3 completed shifts:   In: 300 [P.O.:300]  Out: -      Recent Labs     22  2049 22  2139 22  0153 22  0607   POCGLU 64* 92 166* 172*         General appearance - alert, well appearing, and in no acute distress  Mental status - oriented to person, place, and time with normal affect  Head - normocephalic and atraumatic  Eyes - pupils equal and reactive, extraocular eye movements intact, conjunctiva clear  Ears - hearing appears to be intact  Nose - no drainage noted  Mouth - mucous membranes moist  Neck - supple, no carotid bruits, thyroid not palpable  Chest - clear to auscultation, normal effort  Heart - normal rate, regular rhythm, no murmur  Abdomen - soft, nontender, nondistended, bowel sounds present all four quadrants, no masses, hepatomegaly or splenomegaly  Neurological - normal speech, no focal findings or movement disorder noted, cranial nerves II through XII grossly intact  Extremities - peripheral pulses palpable, no pedal edema or calf pain with palpation  Skin - no gross lesions, rashes, or induration noted        Medications: Allergies:    Allergies   Allergen Reactions    Amoxicillin Diarrhea and Nausea And Vomiting    Bumetanide Nausea And Vomiting    Cephalexin Nausea And Vomiting    Omeprazole Nausea And Vomiting       Current Meds:   Current Facility-Administered Medications:     warfarin (COUMADIN) tablet 2 mg, 2 mg, Oral, Once, Paul Quach MD    cefTRIAXone (ROCEPHIN) 2,000 mg in dextrose 5 % 50 mL IVPB mini-bag, 2,000 mg, IntraVENous, Q24H, Leidy Penny MD, Stopped at 11/27/22 1423    alogliptin (NESINA) tablet 6.25 mg, 6.25 mg, Oral, Daily, Stephanie Demarco MD, 6.25 mg at 11/27/22 1754    potassium chloride (KLOR-CON M) extended release tablet 40 mEq, 40 mEq, Oral, PRN **OR** potassium bicarb-citric acid (EFFER-K) effervescent tablet 40 mEq, 40 mEq, Oral, PRN, 40 mEq at 11/24/22 0925 **OR** potassium chloride 10 mEq/100 mL IVPB (Peripheral Line), 10 mEq, IntraVENous, PRN, Simon Main MD    magnesium sulfate 2000 mg in 50 mL IVPB premix, 2,000 mg, IntraVENous, PRN, Simon Main MD    mirtazapine (REMERON) tablet 7.5 mg, 7.5 mg, Oral, Nightly, Stephanie Demarco MD, 7.5 mg at 11/27/22 2141    allopurinol (ZYLOPRIM) tablet 100 mg, 100 mg, Oral, Daily, Stephanie Demarco MD, 100 mg at 11/27/22 1022    carvedilol (COREG) tablet 6.25 mg, 6.25 mg, Oral, BID WC, Stephanie Demarco MD, 6.25 mg at 11/27/22 1754    cilostazol (PLETAL) tablet 100 mg, 100 mg, Oral, Daily, Stephanie Demarco MD, 100 mg at 11/27/22 1022    pravastatin (PRAVACHOL) tablet 20 mg, 20 mg, Oral, Nightly, Stephanie Demarco MD, 20 mg at 11/27/22 2141    isosorbide mononitrate (IMDUR) extended release tablet 30 mg, 30 mg, Oral, Daily, Stephanie Demarco MD, 30 mg at 11/27/22 1022    sodium chloride flush 0.9 % injection 5-40 mL, 5-40 mL, IntraVENous, 2 times per day, Stephanie Demarco MD, 10 mL at 11/27/22 4241    sodium chloride flush 0.9 % injection 5-40 mL, 5-40 mL, IntraVENous, PRN, Stephanie Demarco MD    0.9 % sodium chloride infusion, , IntraVENous, PRN, Stephanie Demarco MD    ondansetron (ZOFRAN-ODT) disintegrating tablet 4 mg, 4 mg, Oral, Q8H PRN **OR** ondansetron (ZOFRAN) injection 4 mg, 4 mg, IntraVENous, Q6H PRN, Stephanie Demarco MD, 4 mg at 11/22/22 1754    acetaminophen (TYLENOL) tablet 650 mg, 650 mg, Oral, Q6H PRN **OR** acetaminophen (TYLENOL) suppository 650 mg, 650 mg, Rectal, Q6H PRN, Stephanie Demarco MD    doxazosin (CARDURA) tablet 4 mg, 4 mg, Oral, Nightly, Stephanie Demarco MD, 4 mg at 11/27/22 2141    glucose chewable tablet 16 g, 4 tablet, Oral, PRN, Stephanie Demarco MD    dextrose bolus 10% 125 mL, 125 mL, IntraVENous, PRN **OR** dextrose bolus 10% 250 mL, 250 mL, IntraVENous, PRN, Stephanie Demarco MD    glucagon (rDNA) injection 1 mg, 1 mg, SubCUTAneous, PRN, Stephanie Demarco MD    dextrose 10 % infusion, , IntraVENous, Continuous PRN, Stephanie Demarco MD    insulin lispro (HUMALOG) injection vial 0-8 Units, 0-8 Units, SubCUTAneous, TID WC, Stephanie Demarco MD, 2 Units at 11/27/22 1753    insulin lispro (HUMALOG) injection vial 0-4 Units, 0-4 Units, SubCUTAneous, Nightly, Stephanie Demarco MD, 4 Units at 11/24/22 2128    warfarin placeholder: dosing by pharmacy, , Other, Byron Banerjee MD      I/O (24Hr):   No intake or output data in the 24 hours ending 11/28/22 0921    Data:           Labs:    Hematology:  Recent Labs     11/26/22  0533 11/27/22  0538 11/28/22  0626   WBC 6.9 6.8 7.1   RBC 3.28* 3.18* 3.04*   HGB 10.0* 9.8* 9.3*   HCT 31.7* 30.5* 29.7*   MCV 96.6 95.9 97.7   MCH 30.5 30.8 30.6   MCHC 31.5 32.1 31.3   RDW 14.6* 14.6* 14.7*   PLT 95* 90* 111*   MPV 9.8 9.4 10.1   INR 2.5 2.3 2.4     Chemistry:  Recent Labs     11/26/22  0533 11/27/22  0538 11/28/22  0626    141 141   K 3.1* 3.4* 3.8    102 104   CO2 29 29 26   GLUCOSE 172* 181* 176*   BUN 55* 49* 49*   CREATININE 1.69* 1.66* 1.80*   MG 2.3 2.3 2.4   ANIONGAP 12 10 11   LABGLOM 38* 38* 35*   CALCIUM 9.2 9.0 8.9   PHOS 3.2 2.6 2.5     Recent Labs     11/26/22  0533 11/26/22  0625 11/27/22  0538 11/27/22  0613 11/27/22  1129 11/27/22  1559 11/27/22  2049 11/27/22  2139 11/28/22  0153 11/28/22  0607 11/28/22  0626   LABALBU 3.5  --  3.4*  --   --   --   --   --   --   --  3.3*   POCGLU  --    < >  --    < > 273* 215* 64* 92 166* 172*  --     < > = values in this interval not displayed. Lab Results   Component Value Date/Time    SPECIAL RT AC,10ML 11/22/2022 05:35 PM     Lab Results   Component Value Date/Time    CULTURE NO GROWTH 12 HOURS 11/27/2022 01:03 PM       No results found for: POCPH, PHART, PH, POCPCO2, RYV9XQC, PCO2, POCPO2, PO2ART, PO2, POCHCO3, NVN6KEN, HCO3, NBEA, PBEA, BEART, BE, THGBART, THB, KEO9PRX, IJGH3YMX, B7PVRDPB, O2SAT, FIO2    Radiology:    CT HEAD WO CONTRAST    Result Date: 11/22/2022  No evidence for acute intracranial hemorrhage, territorial infarction or intracranial mass lesion. Mild chronic microangiopathic ischemic disease. Mild generalized volume loss. Complete opacification of right maxillary sinus, mild mucosal thickening of the left maxillary sinus. Moderate mucosal thickening of the ethmoidal air cells. Polypoid mucosal thickening within the right side of the nasopharynx. XR CHEST PORTABLE    Result Date: 11/22/2022  No acute cardiopulmonary process suspected         All radiological studies reviewed  Code Status:  DNR-CCA        Electronically signed by Isreal Marrero MD on 11/28/2022 at 9:21 AM    This note was created with the assistance of a speech-recognition program.  Although the intention is to generate a document that actually reflects the content of the visit, no guarantees can be provided that every mistake has been identified and corrected by editing. Note was updated later by me after  physical examination and  completion of the assessment.

## 2022-11-28 NOTE — PLAN OF CARE
Problem: Discharge Planning  Goal: Discharge to home or other facility with appropriate resources  11/28/2022 0020 by Anum Ibanez RN  Outcome: Progressing     Problem: Pain  Goal: Verbalizes/displays adequate comfort level or baseline comfort level  Outcome: Progressing  Flowsheets (Taken 11/28/2022 0020)  Verbalizes/displays adequate comfort level or baseline comfort level:   Encourage patient to monitor pain and request assistance   Administer analgesics based on type and severity of pain and evaluate response   Consider cultural and social influences on pain and pain management   Assess pain using appropriate pain scale   Implement non-pharmacological measures as appropriate and evaluate response     Problem: Safety - Adult  Goal: Free from fall injury  Outcome: Progressing     Problem: Chronic Conditions and Co-morbidities  Goal: Patient's chronic conditions and co-morbidity symptoms are monitored and maintained or improved  Outcome: Progressing  Flowsheets (Taken 11/28/2022 0020)  Care Plan - Patient's Chronic Conditions and Co-Morbidity Symptoms are Monitored and Maintained or Improved:   Collaborate with multidisciplinary team to address chronic and comorbid conditions and prevent exacerbation or deterioration   Update acute care plan with appropriate goals if chronic or comorbid symptoms are exacerbated and prevent overall improvement and discharge   Monitor and assess patient's chronic conditions and comorbid symptoms for stability, deterioration, or improvement     Problem: Neurosensory - Adult  Goal: Achieves maximal functionality and self care  Outcome: Progressing     Problem: Skin/Tissue Integrity - Adult  Goal: Skin integrity remains intact  Outcome: Progressing  Flowsheets (Taken 11/27/2022 1918)  Skin Integrity Remains Intact: Monitor for areas of redness and/or skin breakdown  Goal: Incisions, wounds, or drain sites healing without S/S of infection  Outcome: Progressing  Flowsheets (Taken 11/27/2022 1918)  Incisions, Wounds, or Drain Sites Healing Without Sign and Symptoms of Infection: TWICE DAILY: Assess and document skin integrity     Problem: Musculoskeletal - Adult  Goal: Return mobility to safest level of function  11/28/2022 0020 by Sayda Patiño RN  Outcome: Progressing     Problem: Musculoskeletal - Adult  Goal: Return ADL status to a safe level of function  11/28/2022 0020 by Sayda Patiño RN  Outcome: Progressing

## 2022-11-28 NOTE — PROGRESS NOTES
[facility]  Progress Note    Keegan Simms  MRN: [id number]  Date: 11/28/2022  LOS: 6     Reason for F/U :   Polymicrobial sepsis    Impression :   Viridans Streptococcus strep mitis/paralysis and Streptococcus salivarius bacteremia  Coagulase-negative staph bacteremia - Staph epidermidis and Staphylococcus hominis  Acute kidney injury on chronic kidney disease stage III likely related to dehydration  Recent diarrheal illness-resolved  Chronic atrial fibrillation on Coumadin  Cardiomyopathy  History of DVT/PE    Recommendations: There is a remote history of dental infection least 3 months ago with no current issues and at this point in time is not clear if the species of viridans Streptococcus are related to an odontogenic infection  The patient has 2 different species of viridans isolated in 1 culture and 1 species and another  The coagulase-negative Staphylococcus are likely contaminants given 2 different species in 2 different sets of blood cultures  There is a remote history of prior valvular vegetation reported and 2D echocardiogram done and is pending reports  Continue Rocephin for now and we will make further antibiotic recommendations depending on echocardiogram findings and her repeat culture data. Infection Control Recommendations:   Universal precautions    Discharge Planning:   Estimated Length of IV antimicrobials: To be determined  Patient will need Midline Catheter Insertion/ PICC line Insertion: No  Patient will need: Home IV , [place],  SNF,  LTAC: Undetermined  Patient willneed outpatient wound care: No    Medical Decision making / Summary of Stay:   Keegan Simms is a 80y.o.-year-old male presented to hospital with generalized weakness for 2 days prior to admission associated with 1 episode of vomiting. Symptoms moderate, no alleviating or aggravating factors.   He denied fever or chills, denied pain, no urinary symptoms, no cough or shortness of breath, no diarrhea, no other complaints. He had history of cardiomyopathy, no pacemaker in place, no history of joint placement or other hardware placement. The patient is wearing denture, had tooth extraction around 3-month ago ,denied oral pain. Blood cultures on 2022   2 of 2 grew Streptococcus viridans from different sites, 5 minutes apart also grew coagulase negative staph    Current evaluation:2022    BP (!) 126/57   Pulse 66   Temp 97.3 °F (36.3 °C) (Axillary)   Resp 16   Ht 5' 11\" (1.803 m)   Wt 168 lb (76.2 kg)   SpO2 95%   BMI 23.43 kg/m²     Temperature Range: Temp: 97.3 °F (36.3 °C) Temp  Av °F (36.7 °C)  Min: 97.3 °F (36.3 °C)  Max: 98.6 °F (37 °C)  The patient is seen and evaluated at bedside he is awake and alert in no acute distress. No subjective fever or chills. Abdominal pain nausea vomiting or diarrhea. No chest pains or palpitations. Review of Systems   Constitutional: Negative. HENT: Negative. Respiratory: Negative. Cardiovascular: Negative. Gastrointestinal: Negative. Genitourinary: Negative. Musculoskeletal: Negative. Neurological: Negative. Psychiatric/Behavioral: Negative. Physical Examination :     Physical Exam  Constitutional:       Appearance: He is well-developed. HENT:      Head: Normocephalic and atraumatic. Cardiovascular:      Rate and Rhythm: Normal rate. Heart sounds: Normal heart sounds. No friction rub. No gallop. Pulmonary:      Effort: Pulmonary effort is normal.      Breath sounds: Normal breath sounds. No wheezing. Abdominal:      General: Bowel sounds are normal.      Palpations: Abdomen is soft. There is no mass. Tenderness: There is no abdominal tenderness. Musculoskeletal:         General: Normal range of motion. Cervical back: Normal range of motion and neck supple. Lymphadenopathy:      Cervical: No cervical adenopathy. Skin:     General: Skin is warm and dry.    Neurological:      Mental Status: He is alert and oriented to person, place, and time. Laboratory data:   I have independently reviewed the followinglabs:  CBC with Differential:   Recent Labs     11/27/22  0538 11/28/22  0626   WBC 6.8 7.1   HGB 9.8* 9.3*   HCT 30.5* 29.7*   PLT 90* 111*   LYMPHOPCT 13* 10*   MONOPCT 10 10*     BMP:   Recent Labs     11/27/22  0538 11/28/22  0626    141   K 3.4* 3.8    104   CO2 29 26   BUN 49* 49*   CREATININE 1.66* 1.80*   MG 2.3 2.4     Hepatic Function Panel:   Recent Labs     11/27/22  0538 11/28/22  0626   LABALBU 3.4* 3.3*         Lab Results   Component Value Date/Time    PROCAL 4.03 12/09/2018 04:05 PM     No results found for: CRP  No results found for: SEDRATE      No results found for: DDIMER  Lab Results   Component Value Date/Time    FERRITIN 482 12/10/2018 02:33 PM     No results found for: LDH  No results found for: FIBRINOGEN    Results in Past 30 Days  Result Component Current Result Ref Range Previous Result Ref Range   SARS-CoV-2, Rapid Not Detected (11/22/2022) Not Detected Not in Time Range      Lab Results   Component Value Date/Time    COVID19 Not Detected 11/22/2022 02:55 PM       No results for input(s): [facility] in the last 72 hours. Imaging Studies:   No new imaging    Cultures:     Culture, Blood 1 [[id number]] Collected: 11/27/22 1303   Order Status: Completed Specimen: Blood Updated: 11/28/22 0304    Specimen Description . BLOOD    Culture NO GROWTH 12 HOURS   Culture, Blood 1 [[id number]] Collected: 11/27/22 1258   Order Status: Completed Specimen: Blood Updated: 11/28/22 0302    Specimen Description . BLOOD    Culture NO GROWTH 12 HOURS     Culture, Blood 1 [[id number]] (Abnormal)  Collected: 11/22/22 1735   Order Status: Completed Specimen: Blood Updated: 11/26/22 1048    Specimen Description . BLOOD    Special Requests RT AC,10ML    Culture POSITIVE Blood Culture Abnormal      DIRECT GRAM STAIN FROM BOTTLE: GRAM POSITIVE COCCI IN CHAINS Streptococcus species (not S. agalactiae (Group B), S. pneumoniae, or S. pyogenes (Group A))   Methodology- Polymerase Chain Reaction (PCR)      VIRIDANS STREPTOCOCCUS GROUP Identified as : STREPTOCOCCUS MITIS/STREPTOCOCCUS ORALIS Abnormal      VIRIDANS STREPTOCOCCUS GROUP Identified as : STREPTOCOCCUS SALIVARIUS SSP SALIVARIUS Abnormal      STAPHYLOCOCCUS EPIDERMIDIS Abnormal      (NOTE) Direct Gram Stain from bottle and Polymerase Chain Reaction (PCR) results called to and read back by:CESAR MARRERO AT 10:30 PN 11/23/2022     Viridans streptococcus group (4)    Antibiotic Interpretation Microscan Method Status    penicillin Sensitive 0.023 BACTERIAL SUSCEPTIBILITY PANEL BY E-TEST  Final      Viridans streptococcus group (5)    Antibiotic Interpretation Microscan Method Status    penicillin Intermediate 0.125 BACTERIAL SUSCEPTIBILITY PANEL BY E-TEST  Final      Staphylococcus epidermidis (6)    Antibiotic Interpretation Microscan Method Status    clindamycin Resistant >=8 BACTERIAL SUSCEPTIBILITY PANEL DIO Final    erythromycin Sensitive <=0.25 BACTERIAL SUSCEPTIBILITY PANEL DIO Final    gentamicin Sensitive <=0.5 BACTERIAL SUSCEPTIBILITY PANEL DIO Final     Gentamicin is used only in combination with other active agents that test susceptible. Induced Clind Resist Negative NEGATIVE BACTERIAL SUSCEPTIBILITY PANEL DIO Final    levofloxacin Sensitive <=0.12 BACTERIAL SUSCEPTIBILITY PANEL DIO Final    oxacillin Sensitive <=0.25 BACTERIAL SUSCEPTIBILITY PANEL DIO Final     This staph isolate may be susceptible to Penicillin. Please contact Microbiology for   confirmatory testing of susceptibility if Pencillin is a therapeutic consideration.         tetracycline Sensitive <=1 BACTERIAL SUSCEPTIBILITY PANEL DIO Final    trimethoprim-sulfamethoxazole Sensitive <=10 BACTERIAL SUSCEPTIBILITY PANEL DIO Final     Condensed View           Specimen Collected: 11/22/22 17:35 EST Last Resulted: 11/26/22 10:47 EST        Culture, Blood 1 [[id number]] (Abnormal)  Collected: 11/22/22 1730   Order Status: Completed Specimen: Blood Updated: 11/26/22 1028    Specimen Description . BLOOD    Special Requests LT AC,10ML    Culture POSITIVE Blood Culture Abnormal      DIRECT GRAM STAIN FROM BOTTLE: GRAM POSITIVE COCCI IN CHAINS     STAPHYLOCOCCUS HOMINIS Abnormal      STREPTOCOCCUS SALIVARIUS SSP SALIVARIUS Abnormal      (NOTE) Direct Gram Stain from bottle result called to and read back by:CESAR MARRERO AT 10:30 ON 11/13/2022     Staphylococcus hominis (3)    Antibiotic Interpretation Microscan Method Status    clindamycin Resistant >=8 BACTERIAL SUSCEPTIBILITY PANEL DIO Final    erythromycin Intermediate  BACTERIAL SUSCEPTIBILITY PANEL DIO Final    gentamicin Sensitive <=0.5 BACTERIAL SUSCEPTIBILITY PANEL DIO Final     Gentamicin is used only in combination with other active agents that test susceptible. Induced Clind Resist Negative NEGATIVE BACTERIAL SUSCEPTIBILITY PANEL DIO Final    levofloxacin Sensitive <=0.12 BACTERIAL SUSCEPTIBILITY PANEL DIO Final    oxacillin Sensitive <=0.25 BACTERIAL SUSCEPTIBILITY PANEL DIO Final     This staph isolate may be susceptible to Penicillin. Please contact Microbiology for   confirmatory testing of susceptibility if Pencillin is a therapeutic consideration.         tetracycline Sensitive <=1 BACTERIAL SUSCEPTIBILITY PANEL DIO Final    trimethoprim-sulfamethoxazole Sensitive <=10 BACTERIAL SUSCEPTIBILITY PANEL DOI Final      Streptococcus salivarius ssp salivarius (4)    Antibiotic Interpretation Microscan Method Status    penicillin Sensitive 0.019 BACTERIAL SUSCEPTIBILITY PANEL BY E-TEST  Final     Condensed View           Specimen Collected: 11/22/22 17:30 EST Last Resulted: 11/26/22 10:28 EST           Flu A/B Ag Detection [[id number]] Collected: 11/22/22 1455   Order Status: Completed Specimen: Nasopharyngeal Updated: 11/22/22 1524    Flu A Antigen NEGATIVE    Comment: for Influenza A Antigen       Flu B Antigen NEGATIVE    Comment: for Influenza B Antigen. COVID-19, Rapid [[id number]] Collected: 11/22/22 9720   Order Status: Completed Specimen: Nasopharyngeal Swab Updated: 11/22/22 2614    Specimen Description . NASOPHARYNGEAL SWAB    SARS-CoV-2, Rapid Not Detected    Comment:        Rapid NAAT:  The specimen is NEGATIVE for SARS-CoV-2, the novel coronavirus associated with   COVID-19. The ID NOW COVID-19 assay is designed to detect the virus that causes COVID-19 in patients   with signs and symptoms of infection who are suspected of COVID-19. An individual without symptoms of COVID-19 and who is not shedding SARS-CoV-2 virus would   expect to have a negative (not detected) result in this assay. Negative results should be treated as presumptive and, if inconsistent with clinical signs   and symptoms or necessary for patient management,   should be tested with an alternative molecular assay. Negative results do not preclude   SARS-CoV-2 infection and   should not be used as the sole basis for patient management decisions.          Fact sheet for Healthcare Providers: http://www.josephine.rivka/   Fact sheet for Patients: http://www.josephine.rivka/         Methodology: Isothermal Nucleic Acid Amplification         Medications:      warfarin  2 mg Oral Once    cefTRIAXone (ROCEPHIN) IV  2,000 mg IntraVENous Q24H    alogliptin  6.25 mg Oral Daily    mirtazapine  7.5 mg Oral Nightly    allopurinol  100 mg Oral Daily    carvedilol  6.25 mg Oral BID WC    cilostazol  100 mg Oral Daily    pravastatin  20 mg Oral Nightly    isosorbide mononitrate  30 mg Oral Daily    sodium chloride flush  5-40 mL IntraVENous 2 times per day    doxazosin  4 mg Oral Nightly    insulin lispro  0-8 Units SubCUTAneous TID WC    insulin lispro  0-4 Units SubCUTAneous Nightly    warfarin placeholder: dosing by pharmacy   Other RX Placeholder           [facility]  Joni Manuel Enid Lawrence, [address] [place] [address]  Perfect [employer] messaging  OFFICE: [phone]      Electronically signed by Lay Ryan MD on 11/28/2022 at 10:19 AM  Thank you for allowing us to participate in the care of this patient. Please call with questions. This note iscreated with the assistance of a speech recognition program.  While intending to generate a document that actually reflects the content of the visit, the document can still have some errors including those of syntax andsound a like substitutions which may escape proof reading. In such instances, actual meaning can be extrapolated by contextual diversion.

## 2022-11-28 NOTE — PROGRESS NOTES
Occupational Therapy  Facility/Department: [facility]  Rehabilitation Occupational Therapy Daily Treatment Note    Date: 22  Patient Name: Elizabeth Henson       Room:   MRN: [id number]  Account: [de-identified]   : 1930  (80 y.o.) Gender: male        Pt currently functioning below baseline. Recommend daily inpatient skilled therapy at time of discharge to maximize long term outcomes and prevent re-admission. Please refer to AM-PAC score for current level of function. Past Medical History:  has a past medical history of Achilles tendon pain, Anemia, Atrial fibrillation ([address] Cardiomyopathy ([address] Cardiomyopathy ([address] Cataract, CHF (congestive heart failure) ([address] Chronic kidney disease, COPD (chronic obstructive pulmonary disease) ([address] Dental disease, Diabetes mellitus ([address] Diabetic neuropathy ([address] DVT (deep venous thrombosis) ([address] Edema, Entropion, Gout, Hearing loss, Hyperlipidemia, Hypertension, and Ribs, multiple fractures. Past Surgical History:   has a past surgical history that includes hernia repair (); lipoma resection (); Colonoscopy (); Achilles tendon surgery (Left, ); Tonsillectomy (s); and Eye surgery (Right, 2016). Restrictions  Restrictions/Precautions: General Precautions; Fall Risk;Up as Tolerated  Other position/activity restrictions: Up with assist, easy to chew diet, ALEXANDER diet, ALARMS,  RUE IV  Required Braces or Orthoses?: No    Subjective  Subjective: Pt on toilet upon arrival and agreeable to therapy. Restrictions/Precautions: General Precautions; Fall Risk;Up as Tolerated             Objective     Cognition  Overall Cognitive Status: Exceptions  Arousal/Alertness: Appropriate responses to stimuli  Following Commands: Follows multistep commands with increased time; Follows multistep commands consistently  Attention Span: Appears intact  Memory: Decreased short term memory  Safety Judgement: Decreased awareness of need for assistance;Decreased awareness of need for safety  Problem Solving: Decreased awareness of errors  Insights: Decreased awareness of deficits  Initiation: Requires cues for some  Sequencing: Requires cues for some  Orientation  Overall Orientation Status: Within Functional Limits   Perception  Overall Perceptual Status: [facility]     ADL  Grooming/Oral Hygiene  Assistance Level: Set-up; Stand by assist;Increased time to complete  Skilled Clinical Factors: standing at sink for 8+ minutes completing simple grooming tasks  Upper Extremity Dressing  Assistance Level: Minimal assistance  Skilled Clinical Factors: to change gowns  Toileting  Assistance Level: Minimal assistance  Skilled Clinical Factors: for brief mgmt  Toilet Transfers  Technique: Stand step  Equipment: Standard toilet;Grab bars  Additional Factors: Verbal cues;Cues for hand placement  Assistance Level: Stand by assist          Functional Mobility  Device: Rolling walker  Activity: To/From bathroom (mobility in room)  Assistance Level: Stand by assist  Skilled Clinical Factors: Pt req'd SBA during mobility with NO LOB noted. Bed Mobility  Overall Assistance Level: Stand By Assist  Additional Factors: Verbal cues; Head of bed raised; With handrails  Supine to Sit  Assistance Level: Stand by assist  Scooting  Assistance Level: Stand by assist  Skilled Clinical Factors: to scoot self to EOB  Transfers  Surface: From bed;Standard toilet; To bed  Additional Factors: Verbal cues; Hand placement cues  Device: Walker  Sit to Stand  Assistance Level: Stand by assist  Stand to Sit  Assistance Level: Stand by assist  Skilled Clinical Factors: Min cues for hand placement and overall safety. Neuromuscular Education  Neuromuscular education: Yes  NDT Treatment: Gait ;Sitting;Standing     Assessment  Assessment  Assessment: Pt tolerated session well and is progressing towards goals. Pt remain limited by global weakness and decreased activity tolerance.  Pt req's A LITTLE assist with ADLs and SBA with bed mobility, transfers and functional mobility with RW. Skilled OT indicated to increase safety and IND with all functional tasks to ensure a safe return to PLOF. Activity Tolerance: Patient tolerated treatment well;Patient limited by fatigue  Discharge Recommendations: Patient would benefit from continued therapy after discharge  Safety Devices  Safety Devices in place: Yes  Type of devices: All fall risk precautions in place; Left in bed;Bed alarm in place;Call light within reach;Nurse notified; Patient at risk for falls    Patient Education  Education  Education Given To: Patient  Education Provided: Mobility Training;Transfer Training; Safety  Education Method: Verbal  Barriers to Learning: None  Education Outcome: Verbalized understanding;Demonstrated understanding  Access Code: Alecia Belle: [email or web address]/  Date: 11/28/2022  Prepared by:    Exercises  Wrist AROM Flexion Extension - 1 x daily - 7 x weekly - 3 sets - 10 reps  Seated Forearm Pronation and Supination AROM - 1 x daily - 7 x weekly - 3 sets - 10 reps  Seated Elbow Flexion and Extension AROM - 1 x daily - 7 x weekly - 3 sets - 10 reps  Seated Shoulder Shrugs - 1 x daily - 7 x weekly - 3 sets - 10 reps  Seated Shoulder External Rotation - 1 x daily - 7 x weekly - 3 sets - 10 reps  Seated Shoulder Flexion - 1 x daily - 7 x weekly - 3 sets - 10 reps    Patient Education  Understanding Energy Conservation  Energy Conservation During Daily Tasks  How to Prevent Falls  Check for Safety  Preventing Pressure Injuries    Plan  Occupational Therapy Plan  Times Per Week: 4-5x/week 1x/dayas megan  Current Treatment Recommendations: Strengthening;Balance training;Functional mobility training; Endurance training;Equipment evaluation, education, & procurement; Neuromuscular re-education; Safety education & training;Patient/Caregiver education & training;Self-Care / ADL;Cognitive/Perceptual training;Coordination

## 2022-11-29 VITALS
WEIGHT: 169.2 LBS | HEIGHT: 71 IN | HEART RATE: 66 BPM | TEMPERATURE: 97.9 F | OXYGEN SATURATION: 95 % | SYSTOLIC BLOOD PRESSURE: 123 MMHG | DIASTOLIC BLOOD PRESSURE: 52 MMHG | BODY MASS INDEX: 23.69 KG/M2 | RESPIRATION RATE: 16 BRPM

## 2022-11-29 LAB
ABSOLUTE EOS #: 0.24 K/UL (ref 0–0.44)
ABSOLUTE IMMATURE GRANULOCYTE: 0.06 K/UL (ref 0–0.3)
ABSOLUTE LYMPH #: 0.74 K/UL (ref 1.1–3.7)
ABSOLUTE MONO #: 0.72 K/UL (ref 0.1–1.2)
ALBUMIN SERPL-MCNC: 3.4 G/DL (ref 3.5–5.2)
ANION GAP SERPL CALCULATED.3IONS-SCNC: 10 MMOL/L (ref 9–17)
BASOPHILS # BLD: 1 % (ref 0–2)
BASOPHILS ABSOLUTE: 0.04 K/UL (ref 0–0.2)
BUN BLDV-MCNC: 49 MG/DL (ref 8–23)
BUN/CREAT BLD: 25 (ref 9–20)
CALCIUM SERPL-MCNC: 8.8 MG/DL (ref 8.6–10.4)
CHLORIDE BLD-SCNC: 102 MMOL/L (ref 98–107)
CO2: 28 MMOL/L (ref 20–31)
CREAT SERPL-MCNC: 1.93 MG/DL (ref 0.7–1.2)
EOSINOPHILS RELATIVE PERCENT: 4 % (ref 1–4)
GFR SERPL CREATININE-BSD FRML MDRD: 32 ML/MIN/1.73M2
GLUCOSE BLD-MCNC: 128 MG/DL (ref 75–110)
GLUCOSE BLD-MCNC: 140 MG/DL (ref 70–99)
GLUCOSE BLD-MCNC: 166 MG/DL (ref 75–110)
GLUCOSE BLD-MCNC: 235 MG/DL (ref 75–110)
HCT VFR BLD CALC: 30.4 % (ref 40.7–50.3)
HEMOGLOBIN: 9.5 G/DL (ref 13–17)
IMMATURE GRANULOCYTES: 1 %
INR BLD: 2.6
LYMPHOCYTES # BLD: 12 % (ref 24–43)
MAGNESIUM: 2.3 MG/DL (ref 1.6–2.6)
MCH RBC QN AUTO: 30.4 PG (ref 25.2–33.5)
MCHC RBC AUTO-ENTMCNC: 31.3 G/DL (ref 28.4–34.8)
MCV RBC AUTO: 97.4 FL (ref 82.6–102.9)
MONOCYTES # BLD: 12 % (ref 3–12)
NRBC AUTOMATED: 0 PER 100 WBC
PDW BLD-RTO: 15 % (ref 11.8–14.4)
PHOSPHORUS: 3.1 MG/DL (ref 2.5–4.5)
PLATELET # BLD: 87 K/UL (ref 138–453)
PMV BLD AUTO: 9.7 FL (ref 8.1–13.5)
POTASSIUM SERPL-SCNC: 4 MMOL/L (ref 3.7–5.3)
PROTHROMBIN TIME: 27.7 SEC (ref 11.5–14.2)
RBC # BLD: 3.12 M/UL (ref 4.21–5.77)
RBC # BLD: ABNORMAL 10*6/UL
SEG NEUTROPHILS: 70 % (ref 36–65)
SEGMENTED NEUTROPHILS ABSOLUTE COUNT: 4.45 K/UL (ref 1.5–8.1)
SODIUM BLD-SCNC: 140 MMOL/L (ref 135–144)
WBC # BLD: 6.3 K/UL (ref 3.5–11.3)

## 2022-11-29 PROCEDURE — 97535 SELF CARE MNGMENT TRAINING: CPT

## 2022-11-29 PROCEDURE — 2580000003 HC RX 258: Performed by: FAMILY MEDICINE

## 2022-11-29 PROCEDURE — 85610 PROTHROMBIN TIME: CPT

## 2022-11-29 PROCEDURE — 6370000000 HC RX 637 (ALT 250 FOR IP): Performed by: INTERNAL MEDICINE

## 2022-11-29 PROCEDURE — 97530 THERAPEUTIC ACTIVITIES: CPT

## 2022-11-29 PROCEDURE — 36415 COLL VENOUS BLD VENIPUNCTURE: CPT

## 2022-11-29 PROCEDURE — 83735 ASSAY OF MAGNESIUM: CPT

## 2022-11-29 PROCEDURE — 82947 ASSAY GLUCOSE BLOOD QUANT: CPT

## 2022-11-29 PROCEDURE — 6370000000 HC RX 637 (ALT 250 FOR IP): Performed by: FAMILY MEDICINE

## 2022-11-29 PROCEDURE — 99232 SBSQ HOSP IP/OBS MODERATE 35: CPT | Performed by: INTERNAL MEDICINE

## 2022-11-29 PROCEDURE — 85025 COMPLETE CBC W/AUTO DIFF WBC: CPT

## 2022-11-29 PROCEDURE — 80069 RENAL FUNCTION PANEL: CPT

## 2022-11-29 RX ORDER — LEVOFLOXACIN 500 MG/1
500 TABLET, FILM COATED ORAL EVERY OTHER DAY
Qty: 5 TABLET | Refills: 0 | DISCHARGE
Start: 2022-11-29 | End: 2022-12-08

## 2022-11-29 RX ORDER — MIRTAZAPINE 7.5 MG/1
7.5 TABLET, FILM COATED ORAL NIGHTLY
Qty: 30 TABLET | Refills: 0
Start: 2022-11-29

## 2022-11-29 RX ORDER — ALOGLIPTIN 6.25 MG/1
6.25 TABLET, FILM COATED ORAL DAILY
Qty: 120 TABLET | Refills: 0
Start: 2022-11-30

## 2022-11-29 RX ORDER — WARFARIN SODIUM 2 MG/1
2 TABLET ORAL
Status: COMPLETED | OUTPATIENT
Start: 2022-11-29 | End: 2022-11-29

## 2022-11-29 RX ORDER — LEVOFLOXACIN 500 MG/1
500 TABLET, FILM COATED ORAL EVERY OTHER DAY
Status: DISCONTINUED | OUTPATIENT
Start: 2022-11-29 | End: 2022-11-29 | Stop reason: HOSPADM

## 2022-11-29 RX ADMIN — CARVEDILOL 6.25 MG: 6.25 TABLET, FILM COATED ORAL at 17:18

## 2022-11-29 RX ADMIN — ALLOPURINOL 100 MG: 100 TABLET ORAL at 12:05

## 2022-11-29 RX ADMIN — ISOSORBIDE MONONITRATE 30 MG: 30 TABLET, EXTENDED RELEASE ORAL at 12:03

## 2022-11-29 RX ADMIN — ALOGLIPTIN 6.25 MG: 6.25 TABLET, FILM COATED ORAL at 12:03

## 2022-11-29 RX ADMIN — INSULIN LISPRO 2 UNITS: 100 INJECTION, SOLUTION INTRAVENOUS; SUBCUTANEOUS at 17:19

## 2022-11-29 RX ADMIN — SODIUM CHLORIDE, PRESERVATIVE FREE 10 ML: 5 INJECTION INTRAVENOUS at 12:03

## 2022-11-29 RX ADMIN — CILOSTAZOL 100 MG: 100 TABLET ORAL at 12:04

## 2022-11-29 RX ADMIN — LEVOFLOXACIN 500 MG: 500 TABLET, FILM COATED ORAL at 12:04

## 2022-11-29 RX ADMIN — WARFARIN SODIUM 2 MG: 2 TABLET ORAL at 17:19

## 2022-11-29 ASSESSMENT — ENCOUNTER SYMPTOMS
RESPIRATORY NEGATIVE: 1
GASTROINTESTINAL NEGATIVE: 1

## 2022-11-29 NOTE — PROGRESS NOTES
Reason for Follow up: Acute on chronic kidney disease/fluid electrolyte management    Subjective:    Sachin Levi was seen and evaluated. No new complaints/on RA. Debating re: CLIFF    Review Of Systems: Otherwise as noted in HPI. No current diarrhea/voiding difficulty       Hematology:       Scheduled Meds:   warfarin  2 mg Oral Once    cefTRIAXone (ROCEPHIN) IV  2,000 mg IntraVENous Q24H    alogliptin  6.25 mg Oral Daily    mirtazapine  7.5 mg Oral Nightly    allopurinol  100 mg Oral Daily    carvedilol  6.25 mg Oral BID WC    cilostazol  100 mg Oral Daily    pravastatin  20 mg Oral Nightly    isosorbide mononitrate  30 mg Oral Daily    sodium chloride flush  5-40 mL IntraVENous 2 times per day    doxazosin  4 mg Oral Nightly    insulin lispro  0-8 Units SubCUTAneous TID WC    insulin lispro  0-4 Units SubCUTAneous Nightly    warfarin placeholder: dosing by pharmacy   Other RX Placeholder     Continuous Infusions:   sodium chloride      dextrose       PRN Meds:potassium chloride **OR** potassium alternative oral replacement **OR** potassium chloride, magnesium sulfate, sodium chloride flush, sodium chloride, ondansetron **OR** ondansetron, acetaminophen **OR** acetaminophen, glucose, dextrose bolus **OR** dextrose bolus, glucagon (rDNA), dextrose    Allergies   Allergen Reactions    Amoxicillin Diarrhea and Nausea And Vomiting    Bumetanide Nausea And Vomiting    Cephalexin Nausea And Vomiting     Pt tolerated ceftriaxone during 11/22/2022 admission. Omeprazole Nausea And Vomiting       Physical Exam:    Vitals:    11/28/22 1927 11/29/22 0030 11/29/22 0415 11/29/22 0720   BP: (!) 114/51  (!) 141/59 (!) 123/52   Pulse: 69  (!) 43 (!) 46   Resp: 17  16 14   Temp: 98.4 °F (36.9 °C)  98.1 °F (36.7 °C) 97.9 °F (36.6 °C)   TempSrc: Oral  Oral Oral   SpO2: 95%  96% 95%   Weight:  169 lb 3.2 oz (76.7 kg)     Height:         I/O last 3 completed shifts:   In: 49.2 [IV Piggyback:49.2]  Out: -     PHYSICAL EXAM:  Sachni Levi is awake and alert in no acute distress. Resting on room air. JVP not engorged. He is in a rate controlled atrial fibrillation. His chest is diminished bilaterally but clear. He is a protuberant but nontender abdomen. Chronic lower extremity edema with 2+ pretibial edema and stasis dermatitis. No metabolic flap or gross motor deficit.     Data:    CBC:   Lab Results   Component Value Date    WBC 6.3 11/29/2022    HGB 9.5 (L) 11/29/2022    HCT 30.4 (L) 11/29/2022    MCV 97.4 11/29/2022    PLT 87 (L) 11/29/2022     BMP:    Lab Results   Component Value Date     11/29/2022     11/28/2022     11/27/2022    K 4.0 11/29/2022    K 3.8 11/28/2022    K 3.4 (L) 11/27/2022     11/29/2022     11/28/2022     11/27/2022    CO2 28 11/29/2022    CO2 26 11/28/2022    CO2 29 11/27/2022    BUN 49 (H) 11/29/2022    BUN 49 (H) 11/28/2022    BUN 49 (H) 11/27/2022    CREATININE 1.93 (H) 11/29/2022    CREATININE 1.80 (H) 11/28/2022    CREATININE 1.66 (H) 11/27/2022    GLUCOSE 140 (H) 11/29/2022    GLUCOSE 176 (H) 11/28/2022    GLUCOSE 181 (H) 11/27/2022     CMP:   Lab Results   Component Value Date/Time     11/29/2022 06:15 AM    K 4.0 11/29/2022 06:15 AM     11/29/2022 06:15 AM    CO2 28 11/29/2022 06:15 AM    BUN 49 11/29/2022 06:15 AM    CREATININE 1.93 11/29/2022 06:15 AM    GLUCOSE 140 11/29/2022 06:15 AM    CALCIUM 8.8 11/29/2022 06:15 AM    PROT 6.5 11/23/2022 06:45 AM    LABALBU 3.4 11/29/2022 06:15 AM    BILITOT 0.9 11/23/2022 06:45 AM    ALKPHOS 90 11/23/2022 06:45 AM    AST 11 11/23/2022 06:45 AM    ALT <5 11/23/2022 06:45 AM      Hepatic:   Lab Results   Component Value Date    AST 11 11/23/2022    ALT <5 (L) 11/23/2022    BILITOT 0.9 11/23/2022    ALKPHOS 90 11/23/2022     BNP: No results found for: BNP  Lipids: No results found for: CHOL, HDL  INR:   Lab Results   Component Value Date    INR 2.6 11/29/2022    INR 2.4 11/28/2022    INR 2.3 11/27/2022     PTH: No results found for: PTH  Phosphorus:    Lab Results   Component Value Date/Time    PHOS 3.1 11/29/2022 06:15 AM     Ionized Calcium: No results found for: IONCA  Magnesium:   Lab Results   Component Value Date/Time    MG 2.3 11/29/2022 06:15 AM     Albumin:   Lab Results   Component Value Date/Time    LABALBU 3.4 11/29/2022 06:15 AM         Radiology:  EXAMINATION:   ONE XRAY VIEW OF THE CHEST       11/22/2022 3:21 pm       COMPARISON:   12/09/2018       HISTORY:   ORDERING SYSTEM PROVIDED HISTORY: weak   TECHNOLOGIST PROVIDED HISTORY:   weak   Reason for Exam: fatigue   Additional signs and symptoms: fatigue and weak       FINDINGS:   Stable cardiomegaly with calcific plaque of the thoracic aorta. No signs of   pulmonary vascular congestion. Aside from mild chronic appearing changes   lungs appear grossly clear. Minimal subsegmental atelectasis/scarring left   lung base. No new focal lung consolidation. No large pleural effusions. Bones are osteopenic with degenerative changes. Old left rib fractures. 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 Overt low grade proteinuria  Sepsis/2 out of 2 blood cultures positive for low virulence mixed Staph/Strep  DM2  HTN  HLD  Gout  CHF  Chronic Afib  Peripheral Neuropathy  Chronic multifactorial LE edema    Plan:  On Rocephin  Continue PT OT and supportive care  Follow up labs. Please do not hesitate to call with questions.     Dr. Anisa Dennis M.D.  [phone] [facility]

## 2022-11-29 NOTE — PROGRESS NOTES
[facility]  Progress Note    Rodrigo Dobbs  MRN: [id number]  Date: 11/29/2022  LOS: 7     Reason for F/U :   Polymicrobial sepsis    Impression :   Viridans Streptococcus strep mitis/oralis and Streptococcus salivarius bacteremia  The patient has 2 different species of viridans isolated in 1 culture and 1 species and another  Coagulase-negative staph bacteremia - Staph epidermidis and Staphylococcus hominis in 2 different sets of blood cultures  The coagulase-negative Staphylococcus are likely contaminants given 2 different species in 2 different sets of blood cultures  Acute kidney injury on chronic kidney disease stage III likely related to dehydration  Recent diarrheal illness-resolved  Chronic atrial fibrillation on Coumadin  Cardiomyopathy  History of DVT/PE    Recommendations:    There is a remote history of dental infection least 3 months ago with no current issues and at this point in time is not clear if the species of viridans Streptococcus are related to an odontogenic infection  The patient seems to suggest a gastroenteritis that cause diarrhea and dehydration which would explain the fall/syncopal episode at home  The 2D echocardiogram done and does not show any findings to suggest endocarditis  The patient did receive Rocephin initially at 1 g daily and subsequently switched to 2 g IV daily on 11/27/2022  At this point in time I do not plan on discharging him on IV antibiotic therapy  I will switch him to oral levofloxacin to complete a 14-day course of antibiotic coverage  The patient is okay for discharge to the rehab facility today    Infection Control Recommendations:   Universal precautions    Discharge Planning:   Patient will need Midline Catheter Insertion/ PICC line Insertion: No  Patient will need:  SNF  Patient willneed outpatient wound care: No    Medical Decision making / Summary of Stay:   Rodrigo Dobbs is a 80y.o.-year-old male presented to hospital with generalized weakness for 2 days prior to admission associated with 1 episode of vomiting. Symptoms moderate, no alleviating or aggravating factors. He denied fever or chills, denied pain, no urinary symptoms, no cough or shortness of breath, no diarrhea, no other complaints. He had history of cardiomyopathy, no pacemaker in place, no history of joint placement or other hardware placement. The patient is wearing denture, had tooth extraction around 3-month ago ,denied oral pain. Blood cultures on 2022   2 of 2 grew Streptococcus viridans from different sites, 5 minutes apart also grew coagulase negative staph    Current evaluation:2022    BP (!) 123/52   Pulse (!) 46   Temp 97.9 °F (36.6 °C) (Oral)   Resp 14   Ht 5' 11\" (1.803 m)   Wt 169 lb 3.2 oz (76.7 kg)   SpO2 95%   BMI 23.60 kg/m²     Temperature Range: Temp: 97.9 °F (36.6 °C) Temp  Av.2 °F (36.8 °C)  Min: 97.9 °F (36.6 °C)  Max: 98.4 °F (36.9 °C)  The patient is seen and evaluated at bedside he is awake and alert in no acute distress. No subjective fever or chills. Abdominal pain nausea vomiting or diarrhea. No chest pains or palpitations. Review of Systems   Constitutional: Negative. HENT: Negative. Respiratory: Negative. Cardiovascular: Negative. Gastrointestinal: Negative. Genitourinary: Negative. Musculoskeletal: Negative. Neurological: Negative. Psychiatric/Behavioral: Negative. Physical Examination :     Physical Exam  Constitutional:       Appearance: He is well-developed. HENT:      Head: Normocephalic and atraumatic. Cardiovascular:      Rate and Rhythm: Normal rate. Heart sounds: Normal heart sounds. No friction rub. No gallop. Pulmonary:      Effort: Pulmonary effort is normal.      Breath sounds: Normal breath sounds. No wheezing. Abdominal:      General: Bowel sounds are normal.      Palpations: Abdomen is soft. There is no mass. Tenderness:  There is no abdominal tenderness. Musculoskeletal:         General: Normal range of motion. Cervical back: Normal range of motion and neck supple. Lymphadenopathy:      Cervical: No cervical adenopathy. Skin:     General: Skin is warm and dry. Neurological:      Mental Status: He is alert and oriented to person, place, and time. Laboratory data:   I have independently reviewed the followinglabs:  CBC with Differential:   Recent Labs     11/28/22  0626 11/29/22  0615   WBC 7.1 6.3   HGB 9.3* 9.5*   HCT 29.7* 30.4*   * 87*   LYMPHOPCT 10* 12*   MONOPCT 10* 12       BMP:   Recent Labs     11/28/22  0626 11/29/22  0615    140   K 3.8 4.0    102   CO2 26 28   BUN 49* 49*   CREATININE 1.80* 1.93*   MG 2.4 2.3       Hepatic Function Panel:   Recent Labs     11/28/22  0626 11/29/22  0615   LABALBU 3.3* 3.4*           Lab Results   Component Value Date/Time    PROCAL 4.03 12/09/2018 04:05 PM     No results found for: CRP  No results found for: SEDRATE      No results found for: DDIMER  Lab Results   Component Value Date/Time    FERRITIN 482 12/10/2018 02:33 PM     No results found for: LDH  No results found for: FIBRINOGEN    Results in Past 30 Days  Result Component Current Result Ref Range Previous Result Ref Range   SARS-CoV-2, Rapid Not Detected (11/22/2022) Not Detected Not in Time Range      Lab Results   Component Value Date/Time    COVID19 Not Detected 11/22/2022 02:55 PM       No results for input(s): [facility] in the last 72 hours. Imaging Studies:   TTE procedure: 2D Echocardiogram, M-Mode, Doppler, Color Doppler. 11/28/2022 09:36 AM  Procedure explained to patient. History / Tech. Comments:  Height: 71 inches Weight: 168 pounds BSA: 1.96 mÂ²  CONCLUSIONS  Summary  Global left ventricular systolic function is moderately reduced  Estimated ejection fraction is 35 % .  basal and mid lateral segments are moderately to severely hypokinetic on top of global hypokinesis  Left atrium is severely dilated. Right ventricular mild dilatation with mildly reduced systolic function. Moderate mitral regurgitation. Moderate tricuspid regurgitation. Mild pulmonic insufficiency. No vegetations noted on cardiac valves  Right atrium is moderate to severely dilated . Signature  Electronically signed by All Traylor) on 11/28/2022 10:57 AM  Electronically signed by Quentin Barba(Interpreting physician) on 11/28/2022 07:27 PM    Cultures:     Culture, Blood 1 [[id number]] Collected: 11/27/22 1303   Order Status: Completed Specimen: Blood Updated: 11/29/22 1504    Specimen Description . BLOOD    Culture NO GROWTH 2 DAYS   Culture, Blood 1 [[id number]] Collected: 11/27/22 1258   Order Status: Completed Specimen: Blood Updated: 11/29/22 1502    Specimen Description . BLOOD    Culture NO GROWTH 2 DAYS     Culture, Blood 1 [[id number]] (Abnormal)  Collected: 11/22/22 1735   Order Status: Completed Specimen: Blood Updated: 11/26/22 1048    Specimen Description . BLOOD    Special Requests RT AC,10ML    Culture POSITIVE Blood Culture Abnormal      DIRECT GRAM STAIN FROM BOTTLE: GRAM POSITIVE COCCI IN CHAINS     Streptococcus species (not S. agalactiae (Group B), S. pneumoniae, or S. pyogenes (Group A))   Methodology- Polymerase Chain Reaction (PCR)      VIRIDANS STREPTOCOCCUS GROUP Identified as : STREPTOCOCCUS MITIS/STREPTOCOCCUS ORALIS Abnormal      VIRIDANS STREPTOCOCCUS GROUP Identified as : STREPTOCOCCUS SALIVARIUS SSP SALIVARIUS Abnormal      STAPHYLOCOCCUS EPIDERMIDIS Abnormal      (NOTE) Direct Gram Stain from bottle and Polymerase Chain Reaction (PCR) results called to and read back by:CESAR MARRERO AT 10:30 PN 11/23/2022     Viridans streptococcus group (4)    Antibiotic Interpretation Microscan Method Status    penicillin Sensitive 0.023 BACTERIAL SUSCEPTIBILITY PANEL BY E-TEST  Final      Viridans streptococcus group (5)    Antibiotic Interpretation Microscan Method Status    penicillin Intermediate 0.125 BACTERIAL SUSCEPTIBILITY PANEL BY E-TEST  Final      Staphylococcus epidermidis (6)    Antibiotic Interpretation Microscan Method Status    clindamycin Resistant >=8 BACTERIAL SUSCEPTIBILITY PANEL DIO Final    erythromycin Sensitive <=0.25 BACTERIAL SUSCEPTIBILITY PANEL DIO Final    gentamicin Sensitive <=0.5 BACTERIAL SUSCEPTIBILITY PANEL DIO Final     Gentamicin is used only in combination with other active agents that test susceptible. Induced Clind Resist Negative NEGATIVE BACTERIAL SUSCEPTIBILITY PANEL DIO Final    levofloxacin Sensitive <=0.12 BACTERIAL SUSCEPTIBILITY PANEL DIO Final    oxacillin Sensitive <=0.25 BACTERIAL SUSCEPTIBILITY PANEL DIO Final     This staph isolate may be susceptible to Penicillin. Please contact Microbiology for   confirmatory testing of susceptibility if Pencillin is a therapeutic consideration. tetracycline Sensitive <=1 BACTERIAL SUSCEPTIBILITY PANEL DIO Final    trimethoprim-sulfamethoxazole Sensitive <=10 BACTERIAL SUSCEPTIBILITY PANEL DIO Final     Condensed View           Specimen Collected: 11/22/22 17:35 EST Last Resulted: 11/26/22 10:47 EST        Culture, Blood 1 [[id number]] (Abnormal)  Collected: 11/22/22 1730   Order Status: Completed Specimen: Blood Updated: 11/26/22 1028    Specimen Description . BLOOD    Special Requests LT AC,10ML    Culture POSITIVE Blood Culture Abnormal      DIRECT GRAM STAIN FROM BOTTLE: GRAM POSITIVE COCCI IN CHAINS     STAPHYLOCOCCUS HOMINIS Abnormal      STREPTOCOCCUS SALIVARIUS SSP SALIVARIUS Abnormal      (NOTE) Direct Gram Stain from bottle result called to and read back by:CESAR MARRERO AT 10:30 ON 11/13/2022     Staphylococcus hominis (3)    Antibiotic Interpretation Microscan Method Status    clindamycin Resistant >=8 BACTERIAL SUSCEPTIBILITY PANEL DIO Final    erythromycin Intermediate  BACTERIAL SUSCEPTIBILITY PANEL DIO Final    gentamicin Sensitive <=0.5 BACTERIAL SUSCEPTIBILITY PANEL DIO Final     Gentamicin is used only in combination with other active agents that test susceptible. Induced Clind Resist Negative NEGATIVE BACTERIAL SUSCEPTIBILITY PANEL DIO Final    levofloxacin Sensitive <=0.12 BACTERIAL SUSCEPTIBILITY PANEL DIO Final    oxacillin Sensitive <=0.25 BACTERIAL SUSCEPTIBILITY PANEL DIO Final     This staph isolate may be susceptible to Penicillin. Please contact Microbiology for   confirmatory testing of susceptibility if Pencillin is a therapeutic consideration. tetracycline Sensitive <=1 BACTERIAL SUSCEPTIBILITY PANEL DIO Final    trimethoprim-sulfamethoxazole Sensitive <=10 BACTERIAL SUSCEPTIBILITY PANEL DIO Final      Streptococcus salivarius ssp salivarius (4)    Antibiotic Interpretation Microscan Method Status    penicillin Sensitive 0.019 BACTERIAL SUSCEPTIBILITY PANEL BY E-TEST  Final     Condensed View           Specimen Collected: 11/22/22 17:30 EST Last Resulted: 11/26/22 10:28 EST           Flu A/B Ag Detection [[id number]] Collected: 11/22/22 1455   Order Status: Completed Specimen: Nasopharyngeal Updated: 11/22/22 1524    Flu A Antigen NEGATIVE    Comment: for Influenza A Antigen       Flu B Antigen NEGATIVE    Comment: for Influenza B Antigen. COVID-19, Rapid [[id number]] Collected: 11/22/22 1455   Order Status: Completed Specimen: Nasopharyngeal Swab Updated: 11/22/22 1523    Specimen Description . NASOPHARYNGEAL SWAB    SARS-CoV-2, Rapid Not Detected    Comment:        Rapid NAAT:  The specimen is NEGATIVE for SARS-CoV-2, the novel coronavirus associated with   COVID-19. The ID NOW COVID-19 assay is designed to detect the virus that causes COVID-19 in patients   with signs and symptoms of infection who are suspected of COVID-19. An individual without symptoms of COVID-19 and who is not shedding SARS-CoV-2 virus would   expect to have a negative (not detected) result in this assay.    Negative results should be treated as presumptive and, if inconsistent with clinical signs   and symptoms or necessary for patient management,   should be tested with an alternative molecular assay. Negative results do not preclude   SARS-CoV-2 infection and   should not be used as the sole basis for patient management decisions. Fact sheet for Healthcare Providers: http://www.josephine.rivka/   Fact sheet for Patients: http://www.josephine.rivka/         Methodology: Isothermal Nucleic Acid Amplification         Medications:      cefTRIAXone (ROCEPHIN) IV  2,000 mg IntraVENous Q24H    alogliptin  6.25 mg Oral Daily    mirtazapine  7.5 mg Oral Nightly    allopurinol  100 mg Oral Daily    carvedilol  6.25 mg Oral BID WC    cilostazol  100 mg Oral Daily    pravastatin  20 mg Oral Nightly    isosorbide mononitrate  30 mg Oral Daily    sodium chloride flush  5-40 mL IntraVENous 2 times per day    doxazosin  4 mg Oral Nightly    insulin lispro  0-8 Units SubCUTAneous TID WC    insulin lispro  0-4 Units SubCUTAneous Nightly    warfarin placeholder: dosing by pharmacy   Other RX Placeholder           [facility]  Lay Ryan MD  [employer] messaging  OFFICE: [phone]      Electronically signed by Lay Ryan MD on 11/29/2022 at 11:22 AM  Thank you for allowing us to participate in the care of this patient. Please call with questions. This note iscreated with the assistance of a speech recognition program.  While intending to generate a document that actually reflects the content of the visit, the document can still have some errors including those of syntax andsound a like substitutions which may escape proof reading. In such instances, actual meaning can be extrapolated by contextual diversion.

## 2022-11-29 NOTE — CARE COORDINATION
Social work: Patient to [employer] to [employer] via [address] [facility] [address]  at [facility].  # for RN report: [phone]. Completed GINGER faxed to [phone]. Informed RN, pt, and facility of dc time, agreeable to plan. HENS completed.

## 2022-11-29 NOTE — PROGRESS NOTES
[address] Hospitalist        11/29/2022   9:36 AM    Name:  Phuong Gann  MRN:    [id number]     Acct:     [de-identified]   Room:  2012/2012-02  IP Day: 9     Admit Date: 11/22/2022  2:40 PM  PCP: Michael Jordan MD    C/C:   Chief Complaint   Patient presents with    Fatigue       Assessment:      Sepsis with strep viridans/possible dental source  Coagulase-negative staph  Acute renal failure on CKD stage III  Chronic atrial fibrillation-warfarin  Cardiomyopathy  History of DVT/PE  Hypertension  Hyperlipidemia  Diabetes type 2  Peripheral arterial disease          Plan:        Patient is on MedSurg floor  O2 to maintain oxygen saturation greater than 92%    Antibiotics as below/IV ceftriaxone  Echocardiogram  ID on board    Monitor creatinine  Off IV fluids  Nephrology on board      DVT and GI prophylaxis  Continue to monitor/telemetry/CBC with differential daily/BMP daily  Continue medications as below    Scheduled Meds:   cefTRIAXone (ROCEPHIN) IV  2,000 mg IntraVENous Q24H    alogliptin  6.25 mg Oral Daily    mirtazapine  7.5 mg Oral Nightly    allopurinol  100 mg Oral Daily    carvedilol  6.25 mg Oral BID WC    cilostazol  100 mg Oral Daily    pravastatin  20 mg Oral Nightly    isosorbide mononitrate  30 mg Oral Daily    sodium chloride flush  5-40 mL IntraVENous 2 times per day    doxazosin  4 mg Oral Nightly    insulin lispro  0-8 Units SubCUTAneous TID WC    insulin lispro  0-4 Units SubCUTAneous Nightly    warfarin placeholder: dosing by pharmacy   Other RX Placeholder     Continuous Infusions:   sodium chloride      dextrose       PRN Meds:  potassium chloride, 40 mEq, PRN   Or  potassium alternative oral replacement, 40 mEq, PRN   Or  potassium chloride, 10 mEq, PRN  magnesium sulfate, 2,000 mg, PRN  sodium chloride flush, 5-40 mL, PRN  sodium chloride, , PRN  ondansetron, 4 mg, Q8H PRN   Or  ondansetron, 4 mg, Q6H PRN  acetaminophen, 650 mg, Q6H PRN   Or  acetaminophen, 650 mg, Q6H PRN  glucose, 4 tablet, PRN  dextrose bolus, 125 mL, PRN   Or  dextrose bolus, 250 mL, PRN  glucagon (rDNA), 1 mg, PRN  dextrose, , Continuous PRN          Subjective:     Patient seen and examined at bedside. No overnight events. No acute complaints today. Afebrile  Pt. Denies any CP, SOB, palpitation, HA, dizziness, chills, cough, cold, changes in urination, BM or skin changes or any pain. ROS:  A 10 point system reviewed and negative otherwise mentioned above. Physical Examination:      Vitals:  BP (!) 123/52   Pulse (!) 46   Temp 97.9 °F (36.6 °C) (Oral)   Resp 14   Ht 5' 11\" (1.803 m)   Wt 169 lb 3.2 oz (76.7 kg)   SpO2 95%   BMI 23.60 kg/m²   Temp (24hrs), Av.1 °F (36.7 °C), Min:97.9 °F (36.6 °C), Max:98.4 °F (36.9 °C)    Weight:   Wt Readings from Last 3 Encounters:   22 169 lb 3.2 oz (76.7 kg)   18 199 lb (90.3 kg)     I/O last 3 completed shifts:  I/O last 3 completed shifts:   In: 46.1 [IV Piggyback:49.2]  Out: -      Recent Labs     22  1135 22  1654 22  1928 22  0620   POCGLU 167* 274* 158* 128*           General appearance - alert, well appearing, and in no acute distress  Mental status - oriented to person, place, and time with normal affect  Head - normocephalic and atraumatic  Eyes - pupils equal and reactive, extraocular eye movements intact, conjunctiva clear  Ears - hearing appears to be intact  Nose - no drainage noted  Mouth - mucous membranes moist  Neck - supple, no carotid bruits, thyroid not palpable  Chest - clear to auscultation, normal effort  Heart - normal rate, regular rhythm, no murmur  Abdomen - soft, nontender, nondistended, bowel sounds present all four quadrants, no masses, hepatomegaly or splenomegaly  Neurological - normal speech, no focal findings or movement disorder noted, cranial nerves II through XII grossly intact  Extremities - peripheral pulses palpable, no pedal edema or calf pain with palpation  Skin - no gross lesions, rashes, or induration noted        Medications: Allergies: Allergies   Allergen Reactions    Amoxicillin Diarrhea and Nausea And Vomiting    Bumetanide Nausea And Vomiting    Cephalexin Nausea And Vomiting     Pt tolerated ceftriaxone during 11/22/2022 admission.      Omeprazole Nausea And Vomiting       Current Meds:   Current Facility-Administered Medications:     cefTRIAXone (ROCEPHIN) 2,000 mg in dextrose 5 % 50 mL IVPB mini-bag, 2,000 mg, IntraVENous, Q24H, Gautam Hutchins MD, Stopped at 11/28/22 1347    alogliptin (NESINA) tablet 6.25 mg, 6.25 mg, Oral, Daily, Stephanie Demarco MD, 6.25 mg at 11/28/22 1124    potassium chloride (KLOR-CON M) extended release tablet 40 mEq, 40 mEq, Oral, PRN **OR** potassium bicarb-citric acid (EFFER-K) effervescent tablet 40 mEq, 40 mEq, Oral, PRN, 40 mEq at 11/24/22 0925 **OR** potassium chloride 10 mEq/100 mL IVPB (Peripheral Line), 10 mEq, IntraVENous, PRN, Momo Garcia MD    magnesium sulfate 2000 mg in 50 mL IVPB premix, 2,000 mg, IntraVENous, PRN, Momo Garcia MD    mirtazapine (REMERON) tablet 7.5 mg, 7.5 mg, Oral, Nightly, Stephanie Demarco MD, 7.5 mg at 11/28/22 2217    allopurinol (ZYLOPRIM) tablet 100 mg, 100 mg, Oral, Daily, Stephanie Demarco MD, 100 mg at 11/28/22 1124    carvedilol (COREG) tablet 6.25 mg, 6.25 mg, Oral, BID WC, Stephanie Demarco MD, 6.25 mg at 11/28/22 1713    cilostazol (PLETAL) tablet 100 mg, 100 mg, Oral, Daily, Stephanie Demarco MD, 100 mg at 11/28/22 1124    pravastatin (PRAVACHOL) tablet 20 mg, 20 mg, Oral, Nightly, Stephanie Demarco MD, 20 mg at 11/28/22 2217    isosorbide mononitrate (IMDUR) extended release tablet 30 mg, 30 mg, Oral, Daily, Stephanie Demarco MD, 30 mg at 11/28/22 1124    sodium chloride flush 0.9 % injection 5-40 mL, 5-40 mL, IntraVENous, 2 times per day, Stephanie Demarco MD, 10 mL at 11/28/22 2217    sodium chloride flush 0.9 % injection 5-40 mL, 5-40 mL, IntraVENous, PRN, Stephanie Demarco MD    0.9 % sodium chloride infusion, , IntraVENous, PRN, Stephanie Demarco MD    ondansetron (ZOFRAN-ODT) disintegrating tablet 4 mg, 4 mg, Oral, Q8H PRN **OR** ondansetron (ZOFRAN) injection 4 mg, 4 mg, IntraVENous, Q6H PRN, Stephanie Demarco MD, 4 mg at 11/22/22 1754    acetaminophen (TYLENOL) tablet 650 mg, 650 mg, Oral, Q6H PRN **OR** acetaminophen (TYLENOL) suppository 650 mg, 650 mg, Rectal, Q6H PRN, Stephanie Demarco MD    doxazosin (CARDURA) tablet 4 mg, 4 mg, Oral, Nightly, Stephanie Demarco MD, 4 mg at 11/28/22 2217    glucose chewable tablet 16 g, 4 tablet, Oral, PRN, Stephanie Demarco MD    dextrose bolus 10% 125 mL, 125 mL, IntraVENous, PRN **OR** dextrose bolus 10% 250 mL, 250 mL, IntraVENous, PRN, Stephanie Demarco MD    glucagon (rDNA) injection 1 mg, 1 mg, SubCUTAneous, PRN, Stephanie Demarco MD    dextrose 10 % infusion, , IntraVENous, Continuous PRN, Stephanie Demarco MD    insulin lispro (HUMALOG) injection vial 0-8 Units, 0-8 Units, SubCUTAneous, TID WC, Stephanie Demarco MD, 4 Units at 11/28/22 1713    insulin lispro (HUMALOG) injection vial 0-4 Units, 0-4 Units, SubCUTAneous, Nightly, Stephanie Demarco MD, 4 Units at 11/24/22 2128    warfarin placeholder: dosing by pharmacy, , Other, Crispin Monsalve MD      I/O (24Hr):   No intake or output data in the 24 hours ending 11/29/22 0936    Data:           Labs:    Hematology:  Recent Labs     11/27/22  0538 11/28/22  0626 11/29/22  0615   WBC 6.8 7.1 6.3   RBC 3.18* 3.04* 3.12*   HGB 9.8* 9.3* 9.5*   HCT 30.5* 29.7* 30.4*   MCV 95.9 97.7 97.4   MCH 30.8 30.6 30.4   MCHC 32.1 31.3 31.3   RDW 14.6* 14.7* 15.0*   PLT 90* 111* 87*   MPV 9.4 10.1 9.7   INR 2.3 2.4 2.6       Chemistry:  Recent Labs     11/27/22  0538 11/28/22  0626 11/29/22  0615    141 140   K 3.4* 3.8 4.0    104 102   CO2 29 26 28   GLUCOSE 181* 176* 140*   BUN 49* 49* 49*   CREATININE 1.66* 1.80* 1.93*   MG 2.3 2.4 2.3   ANIONGAP 10 11 10   LABGLOM 38* 35* 32*   CALCIUM 9.0 8.9 8.8   PHOS 2.6 2.5 3.1       Recent Labs     11/27/22  0538 11/27/22  0613 11/28/22  0153 11/28/22  0607 11/28/22  0626 11/28/22  1135 11/28/22  1654 11/28/22  1928 11/29/22  0615 11/29/22  0620   LABALBU 3.4*  --   --   --  3.3*  --   --   --  3.4*  --    POCGLU  --    < > 166* 172*  --  167* 274* 158*  --  128*    < > = values in this interval not displayed. Lab Results   Component Value Date/Time    SPECIAL RT AC,10ML 11/22/2022 05:35 PM     Lab Results   Component Value Date/Time    CULTURE NO GROWTH 1 DAY 11/27/2022 01:03 PM       No results found for: POCPH, PHART, PH, POCPCO2, LYH4JGL, PCO2, POCPO2, PO2ART, PO2, POCHCO3, FKQ4QYS, HCO3, NBEA, PBEA, BEART, BE, THGBART, THB, QTS3DZN, CTKY0ZKT, B0NWJODG, O2SAT, FIO2    Radiology:    CT HEAD WO CONTRAST    Result Date: 11/22/2022  No evidence for acute intracranial hemorrhage, territorial infarction or intracranial mass lesion. Mild chronic microangiopathic ischemic disease. Mild generalized volume loss. Complete opacification of right maxillary sinus, mild mucosal thickening of the left maxillary sinus. Moderate mucosal thickening of the ethmoidal air cells. Polypoid mucosal thickening within the right side of the nasopharynx. XR CHEST PORTABLE    Result Date: 11/22/2022  No acute cardiopulmonary process suspected         All radiological studies reviewed  Code Status:  DNR-CCA        Electronically signed by Fer Titus MD on 11/29/2022 at 9:36 AM    This note was created with the assistance of a speech-recognition program.  Although the intention is to generate a document that actually reflects the content of the visit, no guarantees can be provided that every mistake has been identified and corrected by editing. Note was updated later by me after  physical examination and  completion of the assessment.

## 2022-11-29 NOTE — PLAN OF CARE
Problem: Discharge Planning  Goal: Discharge to home or other facility with appropriate resources  11/29/2022 1140 by Lenin Boo RN  Outcome: Progressing  11/29/2022 0354 by Jose Luis Buck RN  Outcome: Progressing     Problem: Pain  Goal: Verbalizes/displays adequate comfort level or baseline comfort level  11/29/2022 1140 by Lenin Boo RN  Outcome: Progressing  11/29/2022 0354 by Jose Luis Buck RN  Outcome: Progressing     Problem: Safety - Adult  Goal: Free from fall injury  11/29/2022 1140 by Lenin Boo RN  Outcome: Progressing  11/29/2022 0354 by Jose Luis Buck RN  Outcome: Progressing     Problem: ABCDS Injury Assessment  Goal: Absence of physical injury  11/29/2022 1140 by Lenin Boo RN  Outcome: Progressing  11/29/2022 0354 by Jose Luis Buck RN  Outcome: Progressing     Problem: Chronic Conditions and Co-morbidities  Goal: Patient's chronic conditions and co-morbidity symptoms are monitored and maintained or improved  11/29/2022 1140 by Lenin Boo RN  Outcome: Progressing  11/29/2022 0354 by Jose Luis Buck RN  Outcome: Progressing     Problem: Nutrition Deficit:  Goal: Optimize nutritional status  Outcome: Progressing     Problem: Neurosensory - Adult  Goal: Achieves maximal functionality and self care  Outcome: Progressing     Problem: Skin/Tissue Integrity - Adult  Goal: Skin integrity remains intact  Outcome: Progressing  Goal: Incisions, wounds, or drain sites healing without S/S of infection  Outcome: Progressing     Problem: Musculoskeletal - Adult  Goal: Return mobility to safest level of function  Outcome: Progressing  Goal: Return ADL status to a safe level of function  Outcome: Progressing     Problem: Metabolic/Fluid and Electrolytes - Adult  Goal: Electrolytes maintained within normal limits  Outcome: Progressing  Goal: Hemodynamic stability and optimal renal function maintained  Outcome: Progressing  Goal: Glucose maintained within prescribed range  Outcome: Progressing

## 2022-11-29 NOTE — PROGRESS NOTES
Occupational Therapy  Facility/Department: [facility]  Rehabilitation Occupational Therapy Daily Treatment Note    Date: 22  Patient Name: Pia Denver       Room:   MRN: [id number]  Account: [de-identified]   : 1930  (80 y.o.) Gender: male        Pt currently functioning below baseline. Recommend daily inpatient skilled therapy at time of discharge to maximize long term outcomes and prevent re-admission. Please refer to AM-PAC score for current level of function. Past Medical History:  has a past medical history of Achilles tendon pain, Anemia, Atrial fibrillation ([address] Cardiomyopathy ([address] Cardiomyopathy ([address] Cataract, CHF (congestive heart failure) ([address] Chronic kidney disease, COPD (chronic obstructive pulmonary disease) ([address] Dental disease, Diabetes mellitus ([address] Diabetic neuropathy ([address] DVT (deep venous thrombosis) ([address] Edema, Entropion, Gout, Hearing loss, Hyperlipidemia, Hypertension, and Ribs, multiple fractures. Past Surgical History:   has a past surgical history that includes hernia repair (); lipoma resection (); Colonoscopy (); Achilles tendon surgery (Left, ); Tonsillectomy (s); and Eye surgery (Right, 2016). Restrictions  Restrictions/Precautions: General Precautions; Fall Risk;Up as Tolerated  Other position/activity restrictions: Up with assist, easy to chew diet, ALEXANDER diet, ALARMS,  RUE IV  Required Braces or Orthoses?: No    Subjective  Subjective: Pt in bed upon arrival and agreeable to therapy. Restrictions/Precautions: General Precautions; Fall Risk;Up as Tolerated             Objective     Cognition  Overall Cognitive Status: Exceptions  Arousal/Alertness: Appropriate responses to stimuli  Following Commands: Follows multistep commands with increased time; Follows multistep commands consistently  Attention Span: Appears intact  Memory: Decreased short term memory  Safety Judgement: Decreased awareness of need for assistance;Decreased awareness of need for safety  Problem Solving: Decreased awareness of errors  Insights: Decreased awareness of deficits  Initiation: Requires cues for some  Sequencing: Requires cues for some  Orientation  Overall Orientation Status: Within Functional Limits   Perception  Overall Perceptual Status: [facility]     ADL  Grooming/Oral Hygiene  Assistance Level: Set-up; Stand by assist  Skilled Clinical Factors: standing at sink to complete grooming tasks  Toileting  Assistance Level: Minimal assistance  Skilled Clinical Factors: for brief mgmt  Toilet Transfers  Technique: Stand step  Equipment: Standard toilet;Grab bars  Additional Factors: Verbal cues  Assistance Level: Stand by assist  Skilled Clinical Factors: for safety          Functional Mobility  Device: Rolling walker  Activity: To/From bathroom (mobility in room and hallway)  Assistance Level: Stand by assist  Skilled Clinical Factors: No LOB noted  Bed Mobility  Overall Assistance Level: Supervision  Sit to Supine  Assistance Level: Modified independent  Supine to Sit  Assistance Level: Modified independent  Scooting  Assistance Level: Independent  Transfers  Additional Factors: Verbal cues  Device: Walker  Sit to Stand  Assistance Level: Stand by assist  Stand to Sit  Assistance Level: Stand by assist  Skilled Clinical Factors: Min cues for hand placement and overall safety. Neuromuscular Education  Neuromuscular education: Yes  NDT Treatment: Gait ;Sitting;Standing     Assessment  Assessment  Assessment: Pt tolerated session well and is progressing towards goals. Pt remain limited by global weakness and decreased activity tolerance. Pt req's A LITTLE assist with ADLs and SBA with transfers and functional mobility with RW. Skilled OT indicated to increase safety and IND with all functional tasks to ensure a safe return to PLOF.   Activity Tolerance: Patient tolerated treatment well;Patient limited by fatigue  Discharge 32.79 (11/29/22 1244)  ADL Inpatient CMS G-Code Modifier : CJ (11/29/22 1244)      Therapy Time   Individual Concurrent Group Co-treatment   Time In 1043         Time Out 1106         Minutes [address], [facility] details… Alert & oriented; no sensory, motor or coordination deficits, normal reflexes

## 2022-11-29 NOTE — DISCHARGE SUMMARY
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 And says he had been nauseous and vomited once today. Patient says he was not able to eat or drink much weakness continued to worsen after which she was brought to the emergency room patient says he does not understand why he was nauseous. He also says he had poor appetite for several days before that but he does not know why. Is having any fever or chills. Denies any respiratory infection, diarrhea or dysuria. Patient states he has not had a bowel movement for 3 days. Patient admitted for further management. Id was involved in the care. They mentioned, that patient has a remote history of dental infection at least 3 months ago with no current issues. At this point in time it is not clear if the species of returns Streptococcus are related to an order on to [facility]. Patient has 2 different species of reddened isolated in 1 culture and 1 species and other. Coagulase negative Staphylococcus are likely contaminant given 2 different species in 2 different sets of blood cultures.   Patient had a 2D echocardiogram which did not show any vegetation. EF was significant for 35%. Patient was receiving IV Rocephin. Patient is discharged on p.o. Levaquin for 2 weeks. Patient has elevated creatinine. Nephrology was involved in the care. Diuretics were held. Creatinine somewhat improved. Nephrology cleared the patient for discharge. Patient was started on nesina for diabetes control. Also started on Remeron to improve appetite. The plan was discussed in detail with patient who agreed with the plan and verbalized understanding . The patient was seen and examined on day of discharge and this discharge summary is in conjunction with any daily progress note from day of discharge. Hospital Data:    Labs:    Hematology:  Recent Labs     11/27/22  0538 11/28/22  0626 11/29/22  0615   WBC 6.8 7.1 6.3   RBC 3.18* 3.04* 3.12*   HGB 9.8* 9.3* 9.5*   HCT 30.5* 29.7* 30.4*   MCV 95.9 97.7 97.4   MCH 30.8 30.6 30.4   MCHC 32.1 31.3 31.3   RDW 14.6* 14.7* 15.0*   PLT 90* 111* 87*   MPV 9.4 10.1 9.7   INR 2.3 2.4 2.6     Chemistry:  Recent Labs     11/27/22  0538 11/28/22  0626 11/29/22  0615    141 140   K 3.4* 3.8 4.0    104 102   CO2 29 26 28   GLUCOSE 181* 176* 140*   BUN 49* 49* 49*   CREATININE 1.66* 1.80* 1.93*   MG 2.3 2.4 2.3   ANIONGAP 10 11 10   LABGLOM 38* 35* 32*   CALCIUM 9.0 8.9 8.8   PHOS 2.6 2.5 3.1     Recent Labs     11/27/22  0538 11/27/22  0613 11/28/22  0607 11/28/22  0626 11/28/22  1135 11/28/22  1654 11/28/22  1928 11/29/22  0615 11/29/22  0620 11/29/22  1153   LABALBU 3.4*  --   --  3.3*  --   --   --  3.4*  --   --    POCGLU  --    < > 172*  --  167* 274* 158*  --  128* 166*    < > = values in this interval not displayed.      Lab Results   Component Value Date    INR 2.6 11/29/2022    INR 2.4 11/28/2022    INR 2.3 11/27/2022    PROTIME 27.7 (H) 11/29/2022    PROTIME 26.3 (H) 11/28/2022    PROTIME 25.3 (H) 11/27/2022     Lab Results   Component Value Date/Time    SPECIAL RT AC,10ML 2022 05:35 PM     Lab Results   Component Value Date/Time    CULTURE NO GROWTH 1 DAY 2022 01:03 PM       No results found for: POCPH, PHART, PH, POCPCO2, WCK8WNI, PCO2, POCPO2, PO2ART, PO2, POCHCO3, HWF6CHY, HCO3, NBEA, PBEA, BEART, BE, THGBART, THB, ATU2EWL, YAKU6VEP, O7NPSZSF, O2SAT, FIO2    Radiology:    CT HEAD WO CONTRAST    Result Date: 2022  No evidence for acute intracranial hemorrhage, territorial infarction or intracranial mass lesion. Mild chronic microangiopathic ischemic disease. Mild generalized volume loss. Complete opacification of right maxillary sinus, mild mucosal thickening of the left maxillary sinus. Moderate mucosal thickening of the ethmoidal air cells. Polypoid mucosal thickening within the right side of the nasopharynx.      XR CHEST PORTABLE    Result Date: 2022  No acute cardiopulmonary process suspected         All radiological studies reviewed      Reviews of Symptoms:    A 10 point system is reviewed and  negative except described in hospital course    Physical Exam:    Vitals:  BP (!) 123/52   Pulse (!) 46   Temp 97.9 °F (36.6 °C) (Oral)   Resp 14   Ht 5' 11\" (1.803 m)   Wt 169 lb 3.2 oz (76.7 kg)   SpO2 95%   BMI 23.60 kg/m²   Temp (24hrs), Av.2 °F (36.8 °C), Min:97.9 °F (36.6 °C), Max:98.4 °F (36.9 °C)      General appearance - alert, well appearing, and in no acute distress  Mental status - oriented to person, place, and time with normal affect  Head - normocephalic and atraumatic  Eyes - pupils equal and reactive, extraocular eye movements intact, conjunctiva clear  Ears - hearing appears to be intact  Nose - no drainage noted  Mouth - mucous membranes moist  Neck - supple, no carotid bruits, thyroid not palpable  Chest - clear to auscultation, normal effort  Heart - normal rate, regular rhythm, no murmur  Abdomen - soft, nontender, nondistended, bowel sounds present all four quadrants, no masses, hepatomegaly or splenomegaly  Neurological - normal speech, no focal findings or movement disorder noted, cranial nerves II through XII grossly intact  Extremities - peripheral pulses palpable, no pedal edema or calf pain with palpation  Skin - no gross lesions, rashes, or induration noted      Consults:  IP CONSULT TO HOSPITALIST  IP CONSULT TO PHARMACY  IP CONSULT TO NEPHROLOGY  IP CONSULT TO INFECTIOUS DISEASES  IP CONSULT TO CARDIOLOGY    Disposition:  SNF    Discharged Condition: Stable    Follow Up: Jatinder Osborn MD  [address] [place] [address]  [phone]    Follow up in 1 week(s)      Anthony Oviedo MD  [address] [place] [address]  [phone]    Follow up        Lab Frequency Next Occurrence   Up with assistance PRN    Intake and output EVERY 8 HOURS    Initiate Oxygen Therapy Protocol PRN    Pulse Oximetry Spot Check PRN    Protime-INR Daily (Lab)    Renal Function Panel Daily (Lab)    POCT glucose 4 TIMES DAILY BEFORE MEALS & AT BEDTIME    HYPOGLYCEMIA TREATMENT: blood glucose LESS THAN 70 mg/dL and patient ALERT and TOLERATING PO PRN    HYPOGLYCEMIA TREATMENT: blood glucose LESS THAN 70 mg/dL and patient NOT ALERT or NPO PRN    POCT Glucose PRN    Basic Metabolic Panel TOMORROW AM    Magnesium TOMORROW AM    CBC with Auto Differential TOMORROW AM          Diet: ADULT DIET; Easy to Chew; Low Fat/Low Chol/High Fiber/ALEXANDER    Discharge Medications:      Medication List        START taking these medications      alogliptin 6.25 MG Tabs tablet  Commonly known as: NESINA  Take 1 tablet by mouth daily  Start taking on: November 30, 2022     levoFLOXacin 500 MG tablet  Commonly known as: LEVAQUIN  Take 1 tablet by mouth every other day for 5 doses     mirtazapine 7.5 MG tablet  Commonly known as: REMERON  Take 1 tablet by mouth nightly            CHANGE how you take these medications      warfarin 2 MG tablet  Commonly known as: COUMADIN  What changed: Another medication with the same name was removed.  Continue taking this medication, and follow the directions you see here. CONTINUE taking these medications      allopurinol 100 MG tablet  Commonly known as: ZYLOPRIM     carvedilol 6.25 MG tablet  Commonly known as: COREG     cilostazol 100 MG tablet  Commonly known as: PLETAL     darbepoetin calos-polysorbate 40 MCG/0.4ML Sosy injection  Commonly known as: ARANESP     doxazosin 4 MG tablet  Commonly known as: CARDURA     isosorbide mononitrate 60 MG extended release tablet  Commonly known as: IMDUR     pravastatin 40 MG tablet  Commonly known as: PRAVACHOL     Vitamin D3 1.25 MG (43765 UT) Tabs            STOP taking these medications      furosemide 40 MG tablet  Commonly known as: LASIX     gabapentin 300 MG capsule  Commonly known as: NEURONTIN     ISOSORBIDE PO     spironolactone 25 MG tablet  Commonly known as: ALDACTONE               Where to Get Your Medications        Information about where to get these medications is not yet available    Ask your nurse or doctor about these medications  alogliptin 6.25 MG Tabs tablet  levoFLOXacin 500 MG tablet  mirtazapine 7.5 MG tablet         Code Status:  DNR-CCA    Time Spent on discharge is   41 mins  in patient examination, evaluation, counseling as well as medication reconciliation, prescriptions for required medications, discharge plan and follow up. Electronically signed by Fer Titus MD on 11/29/2022 at 12:16 PM     Thank you Dr. Stephanie Martin MD for the opportunity to be involved in this patient's care. This note was created with the assistance of a speech-recognition program.  Although the intention is to generate a document that actually reflects the content of the visit, no guarantees can be provided that every mistake has been identified and corrected by editing. Note was updated later by me after  physical examination and  completion of the assessment.

## 2022-11-29 NOTE — PROGRESS NOTES
Comprehensive Nutrition Assessment    Type and Reason for Visit:  Reassess    Nutrition Recommendations/Plan:   Continue current diet. Malnutrition Assessment:  Malnutrition Status:  Severe malnutrition (11/23/22 1618)    Context:  Acute Illness     Findings of the 6 clinical characteristics of malnutrition:  Energy Intake:  50% or less of estimated energy requirements for 5 or more days  Weight Loss:  Greater than 7.5% over 3 months     Body Fat Loss:  Unable to assess     Muscle Mass Loss:  Unable to assess    Fluid Accumulation:  No significant fluid accumulation Extremities   Strength:  Not Performed    Nutrition Assessment:    Patient continues to have fair appetite, consuming about 50% of meals. Patient again refused offer of oral nutritional supplements. Weight stable. Will continue current diet and monitor po intakes. Possible discharge later today. Nutrition Related Findings:    Edema: +1 BLE. Bowel sounds active. Last BM 11/28. Labs and meds reviewed. Wound Type: None       Current Nutrition Intake & Therapies:    Average Meal Intake: 51-75%, 26-50%  Average Supplements Intake: Refusing to take  ADULT DIET; Easy to Chew; Low Fat/Low Chol/High Fiber/ALEXANDER    Anthropometric Measures:  Height: 5' 11\" (180.3 cm)  Ideal Body Weight (IBW): 172 lbs (78 kg)       Current Body Weight: 170 lb (77.1 kg), 98.8 % IBW.  Weight Source: Bed Scale  Current BMI (kg/m2): 23.7  Usual Body Weight: 190 lb (86.2 kg)  % Weight Change (Calculated): -12.6                    BMI Categories: Normal Weight (BMI 22.0 to 24.9) age over 72    Estimated Daily Nutrient Needs:  Energy Requirements Based On: Kcal/kg  Weight Used for Energy Requirements: Current  Energy (kcal/day): 1885 kcal (25 kcal/kg)  Weight Used for Protein Requirements: Ideal  Protein (g/day):  gm of protein (1.2-1.3 gm/kg)     Nutrition Diagnosis:   Severe malnutrition related to inadequate protein-energy intake as evidenced by weight loss, poor intake prior to admission, intake 26-50%, intake 0-25%, weight loss 7.5% in 3 months    Nutrition Interventions:   Food and/or Nutrient Delivery: Continue Current Diet  Nutrition Education/Counseling: Education not indicated  Coordination of Nutrition Care: Continue to monitor while inpatient       Goals:     Goals: PO intake 75% or greater       Nutrition Monitoring and Evaluation:   Behavioral-Environmental Outcomes: None Identified  Food/Nutrient Intake Outcomes: Food and Nutrient Intake  Physical Signs/Symptoms Outcomes: Biochemical Data, Fluid Status or Edema, Skin, Weight    Discharge Planning:    Continue current diet       Some areas of assessment may be incomplete due to COVID-19 precautions    Cleopatra Hebert RD, LD  Office phone [phone]

## 2022-11-30 NOTE — PROGRESS NOTES
Pt discharged to [facility] in stable condition with belongings via [id number] [facility]  Discharge instructions given  Pt denies having any further questions at this time  Patient/family state they have everything they were admitted with.

## 2022-11-30 NOTE — PLAN OF CARE
Problem: Discharge Planning  Goal: Discharge to home or other facility with appropriate resources  11/29/2022 1900 by Mat Alexander RN  Outcome: Progressing  11/29/2022 1140 by Mat Alexander RN  Outcome: Progressing     Problem: Pain  Goal: Verbalizes/displays adequate comfort level or baseline comfort level  11/29/2022 1900 by Mat Alexander RN  Outcome: Progressing  11/29/2022 1140 by Mat Alexander, RN  Outcome: Progressing     Problem: Safety - Adult  Goal: Free from fall injury  11/29/2022 1900 by Mat Alexander RN  Outcome: Progressing  11/29/2022 1140 by Mat Alexander, RN  Outcome: Progressing     Problem: ABCDS Injury Assessment  Goal: Absence of physical injury  11/29/2022 1900 by Mat Alexander RN  Outcome: Progressing  11/29/2022 1140 by Mat Alexander RN  Outcome: Progressing     Problem: Chronic Conditions and Co-morbidities  Goal: Patient's chronic conditions and co-morbidity symptoms are monitored and maintained or improved  11/29/2022 1900 by Mat Alexander, RN  Outcome: Progressing  11/29/2022 1140 by Mat Alexander RN  Outcome: Progressing     Problem: Nutrition Deficit:  Goal: Optimize nutritional status  11/29/2022 1900 by Mat Alexander, RN  Outcome: Progressing  11/29/2022 1140 by Mat Alexander RN  Outcome: Progressing     Problem: Neurosensory - Adult  Goal: Achieves maximal functionality and self care  11/29/2022 1900 by Mat Alexander, RN  Outcome: Progressing  11/29/2022 1140 by Mat Alexander RN  Outcome: Progressing     Problem: Skin/Tissue Integrity - Adult  Goal: Skin integrity remains intact  11/29/2022 1900 by Mat Alexander, RN  Outcome: Progressing  11/29/2022 1140 by Mat Alexander RN  Outcome: Progressing  Goal: Incisions, wounds, or drain sites healing without S/S of infection  11/29/2022 1900 by Mat Alexander, RN  Outcome: Progressing  11/29/2022 1140 by Mat Alexander RN  Outcome: Progressing     Problem: Musculoskeletal - Adult  Goal: Return mobility to safest level of function  11/29/2022 1900 by Samuel Mills RN  Outcome: Progressing  11/29/2022 1140 by Samuel Mills RN  Outcome: Progressing  Goal: Return ADL status to a safe level of function  11/29/2022 1900 by Samuel Mills RN  Outcome: Progressing  11/29/2022 1140 by Samuel Mills RN  Outcome: Progressing     Problem: Metabolic/Fluid and Electrolytes - Adult  Goal: Electrolytes maintained within normal limits  11/29/2022 1900 by Samuel Mills RN  Outcome: Progressing  11/29/2022 1140 by Samuel Mills RN  Outcome: Progressing  Goal: Hemodynamic stability and optimal renal function maintained  11/29/2022 1900 by Samuel Mills RN  Outcome: Progressing  11/29/2022 1140 by Samuel Mills RN  Outcome: Progressing  Goal: Glucose maintained within prescribed range  11/29/2022 1900 by Samuel Mills RN  Outcome: Progressing  11/29/2022 1140 by Samuel Mills RN  Outcome: Progressing

## 2022-12-02 LAB
CULTURE: NORMAL
CULTURE: NORMAL
SPECIMEN DESCRIPTION: NORMAL
SPECIMEN DESCRIPTION: NORMAL

## 2022-12-05 NOTE — PROGRESS NOTES
Physician Progress Note      PATIENT:               Saranya Stockton  CSN #:                  [id number]  :                       1930  ADMIT DATE:       2022 2:40 PM  [address] [place] DATE:        2022 7:28 PM  RESPONDING  PROVIDER #:        Kira Albarran MD          QUERY TEXT:    Pt admitted with dehydration. Noted documentation of Sepsis on ,     by ordered Nephrology consultant. If possible, please document in progress   notes and discharge summary:    The medical record reflects the following:  Risk Factors: bacteremia  Clinical Indicators: \"Sepsis/2 out of 2 blood cultures positive for GPC's in   chains: on Rocephin\" documented by Nephrology. On admission VS WNL, WBC WNL,   no procal or lactic acid drawn. Per Infectious Consult note :   Streptococcus viridans bacteremia, possible dental source; Coagulase-negative   staph positive blood culture could be contamination. Treatment: IV Rocephin    Thank you,  Edgardo De La Cruz MSN, RN, CCDS, CRCR  Options provided:  -- Sepsis ruled out  -- Sepsis confirmed present on admission  -- Other - I will add my own diagnosis  -- Disagree - Not applicable / Not valid  -- Disagree - Clinically unable to determine / Unknown  -- Refer to Clinical Documentation Reviewer    PROVIDER RESPONSE TEXT:    The diagnosis of sepsis was confirmed as present on admission.     Query created by: Joann Campos on 2022 1:00 PM      Electronically signed by:  Kira Albarran MD 2022 4:58 PM

## 2022-12-22 PROBLEM — E86.0 DEHYDRATION: Status: RESOLVED | Noted: 2022-11-22 | Resolved: 2022-12-22

## 2024-04-29 ENCOUNTER — APPOINTMENT (OUTPATIENT)
Dept: GENERAL RADIOLOGY | Age: 89
DRG: 602 | End: 2024-04-29
Payer: COMMERCIAL

## 2024-04-29 ENCOUNTER — APPOINTMENT (OUTPATIENT)
Dept: VASCULAR LAB | Age: 89
DRG: 602 | End: 2024-04-29
Attending: EMERGENCY MEDICINE
Payer: COMMERCIAL

## 2024-04-29 ENCOUNTER — HOSPITAL ENCOUNTER (INPATIENT)
Age: 89
LOS: 9 days | Discharge: NURSING, REHAB OR LONG-TERM CARE | DRG: 602 | End: 2024-05-08
Attending: EMERGENCY MEDICINE | Admitting: HOSPITALIST
Payer: COMMERCIAL

## 2024-04-29 DIAGNOSIS — I50.23 ACUTE ON CHRONIC SYSTOLIC CHF (CONGESTIVE HEART FAILURE) (HCC): ICD-10-CM

## 2024-04-29 DIAGNOSIS — I48.20 ATRIAL FIBRILLATION, CHRONIC (HCC): ICD-10-CM

## 2024-04-29 DIAGNOSIS — L03.115 BILATERAL LOWER LEG CELLULITIS: Primary | ICD-10-CM

## 2024-04-29 DIAGNOSIS — L03.116 BILATERAL LOWER LEG CELLULITIS: Primary | ICD-10-CM

## 2024-04-29 PROBLEM — L03.90 CELLULITIS: Status: ACTIVE | Noted: 2024-04-29

## 2024-04-29 LAB
ANION GAP SERPL CALCULATED.3IONS-SCNC: 14 MMOL/L (ref 9–17)
BASOPHILS # BLD: 0.06 K/UL (ref 0–0.2)
BASOPHILS NFR BLD: 1 %
BNP SERPL-MCNC: ABNORMAL PG/ML
BUN SERPL-MCNC: 41 MG/DL (ref 8–23)
BUN/CREAT SERPL: 21 (ref 9–20)
CALCIUM SERPL-MCNC: 8.6 MG/DL (ref 8.6–10.4)
CHLORIDE SERPL-SCNC: 94 MMOL/L (ref 98–107)
CO2 SERPL-SCNC: 22 MMOL/L (ref 20–31)
CREAT SERPL-MCNC: 2 MG/DL (ref 0.7–1.2)
ECHO BSA: 2.01 M2
EKG ATRIAL RATE: 107 BPM
EKG Q-T INTERVAL: 368 MS
EKG QRS DURATION: 86 MS
EKG QTC CALCULATION (BAZETT): 474 MS
EKG R AXIS: 77 DEGREES
EKG T AXIS: 10 DEGREES
EKG VENTRICULAR RATE: 100 BPM
EOSINOPHIL # BLD: 0.06 K/UL (ref 0–0.4)
EOSINOPHILS RELATIVE PERCENT: 1 % (ref 1–4)
ERYTHROCYTE [DISTWIDTH] IN BLOOD BY AUTOMATED COUNT: 16.7 % (ref 11.8–14.4)
GFR SERPL CREATININE-BSD FRML MDRD: 31 ML/MIN/1.73M2
GLUCOSE BLD-MCNC: 159 MG/DL (ref 75–110)
GLUCOSE BLD-MCNC: 237 MG/DL (ref 75–110)
GLUCOSE SERPL-MCNC: 164 MG/DL (ref 70–99)
HCT VFR BLD AUTO: 28.9 % (ref 40.7–50.3)
HGB BLD-MCNC: 9.2 G/DL (ref 13–17)
IMM GRANULOCYTES # BLD AUTO: 0.06 K/UL (ref 0–0.3)
IMM GRANULOCYTES NFR BLD: 1 %
INR PPP: 3.1
LACTATE BLDV-SCNC: 1.2 MMOL/L (ref 0.5–1.9)
LACTATE BLDV-SCNC: 1.4 MMOL/L (ref 0.5–1.9)
LYMPHOCYTES NFR BLD: 0.58 K/UL (ref 1–4.8)
LYMPHOCYTES RELATIVE PERCENT: 10 % (ref 24–44)
MCH RBC QN AUTO: 29.2 PG (ref 25.2–33.5)
MCHC RBC AUTO-ENTMCNC: 31.8 G/DL (ref 28.4–34.8)
MCV RBC AUTO: 91.7 FL (ref 82.6–102.9)
MONOCYTES NFR BLD: 0.7 K/UL (ref 0.2–0.8)
MONOCYTES NFR BLD: 12 % (ref 1–7)
NEUTROPHILS NFR BLD: 75 % (ref 36–66)
NEUTS SEG NFR BLD: 4.34 K/UL (ref 1.8–7.7)
NRBC BLD-RTO: 0 PER 100 WBC
PARTIAL THROMBOPLASTIN TIME: 46.2 SEC (ref 23.9–33.8)
PLATELET # BLD AUTO: 65 K/UL (ref 138–453)
PMV BLD AUTO: 11.1 FL (ref 8.1–13.5)
POTASSIUM SERPL-SCNC: 3.7 MMOL/L (ref 3.7–5.3)
PROCALCITONIN SERPL-MCNC: 0.15 NG/ML (ref 0–0.09)
PROTHROMBIN TIME: 31.3 SEC (ref 11.5–14.2)
RBC # BLD AUTO: 3.15 M/UL (ref 4.21–5.77)
SODIUM SERPL-SCNC: 130 MMOL/L (ref 135–144)
WBC OTHER # BLD: 5.8 K/UL (ref 3.5–11.3)

## 2024-04-29 PROCEDURE — 2580000003 HC RX 258: Performed by: HOSPITALIST

## 2024-04-29 PROCEDURE — 6360000002 HC RX W HCPCS: Performed by: HOSPITALIST

## 2024-04-29 PROCEDURE — 87040 BLOOD CULTURE FOR BACTERIA: CPT

## 2024-04-29 PROCEDURE — 1200000000 HC SEMI PRIVATE

## 2024-04-29 PROCEDURE — 2580000003 HC RX 258: Performed by: EMERGENCY MEDICINE

## 2024-04-29 PROCEDURE — 36415 COLL VENOUS BLD VENIPUNCTURE: CPT

## 2024-04-29 PROCEDURE — 99285 EMERGENCY DEPT VISIT HI MDM: CPT

## 2024-04-29 PROCEDURE — 80048 BASIC METABOLIC PNL TOTAL CA: CPT

## 2024-04-29 PROCEDURE — 84145 PROCALCITONIN (PCT): CPT

## 2024-04-29 PROCEDURE — 83880 ASSAY OF NATRIURETIC PEPTIDE: CPT

## 2024-04-29 PROCEDURE — 6360000002 HC RX W HCPCS: Performed by: EMERGENCY MEDICINE

## 2024-04-29 PROCEDURE — 93005 ELECTROCARDIOGRAM TRACING: CPT | Performed by: EMERGENCY MEDICINE

## 2024-04-29 PROCEDURE — 83605 ASSAY OF LACTIC ACID: CPT

## 2024-04-29 PROCEDURE — 93970 EXTREMITY STUDY: CPT | Performed by: SURGERY

## 2024-04-29 PROCEDURE — 85610 PROTHROMBIN TIME: CPT

## 2024-04-29 PROCEDURE — 85730 THROMBOPLASTIN TIME PARTIAL: CPT

## 2024-04-29 PROCEDURE — 71045 X-RAY EXAM CHEST 1 VIEW: CPT

## 2024-04-29 PROCEDURE — 85025 COMPLETE CBC W/AUTO DIFF WBC: CPT

## 2024-04-29 PROCEDURE — 82947 ASSAY GLUCOSE BLOOD QUANT: CPT

## 2024-04-29 PROCEDURE — 6370000000 HC RX 637 (ALT 250 FOR IP): Performed by: HOSPITALIST

## 2024-04-29 PROCEDURE — 96365 THER/PROPH/DIAG IV INF INIT: CPT

## 2024-04-29 PROCEDURE — 93970 EXTREMITY STUDY: CPT

## 2024-04-29 RX ORDER — CILOSTAZOL 100 MG/1
100 TABLET ORAL DAILY
Status: DISCONTINUED | OUTPATIENT
Start: 2024-04-29 | End: 2024-05-08 | Stop reason: HOSPADM

## 2024-04-29 RX ORDER — SENNOSIDES A AND B 8.6 MG/1
1 TABLET, FILM COATED ORAL 2 TIMES DAILY PRN
Status: DISCONTINUED | OUTPATIENT
Start: 2024-04-29 | End: 2024-05-08 | Stop reason: HOSPADM

## 2024-04-29 RX ORDER — INSULIN LISPRO 100 [IU]/ML
0-8 INJECTION, SOLUTION INTRAVENOUS; SUBCUTANEOUS
Status: DISCONTINUED | OUTPATIENT
Start: 2024-04-29 | End: 2024-05-08 | Stop reason: HOSPADM

## 2024-04-29 RX ORDER — ONDANSETRON 2 MG/ML
4 INJECTION INTRAMUSCULAR; INTRAVENOUS EVERY 6 HOURS PRN
Status: DISCONTINUED | OUTPATIENT
Start: 2024-04-29 | End: 2024-05-08 | Stop reason: HOSPADM

## 2024-04-29 RX ORDER — SODIUM CHLORIDE 0.9 % (FLUSH) 0.9 %
10 SYRINGE (ML) INJECTION PRN
Status: DISCONTINUED | OUTPATIENT
Start: 2024-04-29 | End: 2024-05-08 | Stop reason: HOSPADM

## 2024-04-29 RX ORDER — ONDANSETRON 4 MG/1
4 TABLET, ORALLY DISINTEGRATING ORAL EVERY 8 HOURS PRN
Status: DISCONTINUED | OUTPATIENT
Start: 2024-04-29 | End: 2024-05-08 | Stop reason: HOSPADM

## 2024-04-29 RX ORDER — ISOSORBIDE MONONITRATE 30 MG/1
30 TABLET, EXTENDED RELEASE ORAL DAILY
Status: DISCONTINUED | OUTPATIENT
Start: 2024-04-29 | End: 2024-05-08 | Stop reason: HOSPADM

## 2024-04-29 RX ORDER — WARFARIN SODIUM 2 MG/1
2 TABLET ORAL
Status: DISCONTINUED | OUTPATIENT
Start: 2024-04-29 | End: 2024-04-29

## 2024-04-29 RX ORDER — INSULIN LISPRO 100 [IU]/ML
0-4 INJECTION, SOLUTION INTRAVENOUS; SUBCUTANEOUS NIGHTLY
Status: DISCONTINUED | OUTPATIENT
Start: 2024-04-29 | End: 2024-05-08 | Stop reason: HOSPADM

## 2024-04-29 RX ORDER — SODIUM CHLORIDE 0.9 % (FLUSH) 0.9 %
10 SYRINGE (ML) INJECTION EVERY 12 HOURS SCHEDULED
Status: DISCONTINUED | OUTPATIENT
Start: 2024-04-29 | End: 2024-05-08 | Stop reason: HOSPADM

## 2024-04-29 RX ORDER — ACETAMINOPHEN 325 MG/1
650 TABLET ORAL EVERY 6 HOURS PRN
Status: DISCONTINUED | OUTPATIENT
Start: 2024-04-29 | End: 2024-05-08 | Stop reason: HOSPADM

## 2024-04-29 RX ORDER — HYDROCODONE BITARTRATE AND ACETAMINOPHEN 5; 325 MG/1; MG/1
1 TABLET ORAL EVERY 6 HOURS PRN
Status: DISCONTINUED | OUTPATIENT
Start: 2024-04-29 | End: 2024-05-08 | Stop reason: HOSPADM

## 2024-04-29 RX ORDER — SODIUM CHLORIDE 9 MG/ML
INJECTION, SOLUTION INTRAVENOUS PRN
Status: DISCONTINUED | OUTPATIENT
Start: 2024-04-29 | End: 2024-05-08 | Stop reason: HOSPADM

## 2024-04-29 RX ORDER — FUROSEMIDE 10 MG/ML
40 INJECTION INTRAMUSCULAR; INTRAVENOUS DAILY
Status: DISCONTINUED | OUTPATIENT
Start: 2024-04-29 | End: 2024-05-02

## 2024-04-29 RX ORDER — ACETAMINOPHEN 650 MG/1
650 SUPPOSITORY RECTAL EVERY 6 HOURS PRN
Status: DISCONTINUED | OUTPATIENT
Start: 2024-04-29 | End: 2024-05-08 | Stop reason: HOSPADM

## 2024-04-29 RX ORDER — PRAVASTATIN SODIUM 20 MG
20 TABLET ORAL NIGHTLY
Status: DISCONTINUED | OUTPATIENT
Start: 2024-04-29 | End: 2024-05-08 | Stop reason: HOSPADM

## 2024-04-29 RX ORDER — ALLOPURINOL 100 MG/1
100 TABLET ORAL DAILY
Status: DISCONTINUED | OUTPATIENT
Start: 2024-04-29 | End: 2024-05-08 | Stop reason: HOSPADM

## 2024-04-29 RX ORDER — DEXTROSE MONOHYDRATE 100 MG/ML
INJECTION, SOLUTION INTRAVENOUS CONTINUOUS PRN
Status: DISCONTINUED | OUTPATIENT
Start: 2024-04-29 | End: 2024-05-08 | Stop reason: HOSPADM

## 2024-04-29 RX ORDER — DOXAZOSIN MESYLATE 4 MG/1
4 TABLET ORAL NIGHTLY
Status: DISCONTINUED | OUTPATIENT
Start: 2024-04-29 | End: 2024-05-08 | Stop reason: HOSPADM

## 2024-04-29 RX ADMIN — ALLOPURINOL 100 MG: 100 TABLET ORAL at 21:14

## 2024-04-29 RX ADMIN — VANCOMYCIN HYDROCHLORIDE 2000 MG: 5 INJECTION, POWDER, LYOPHILIZED, FOR SOLUTION INTRAVENOUS at 13:44

## 2024-04-29 RX ADMIN — PRAVASTATIN SODIUM 20 MG: 20 TABLET ORAL at 21:14

## 2024-04-29 RX ADMIN — FUROSEMIDE 40 MG: 10 INJECTION, SOLUTION INTRAMUSCULAR; INTRAVENOUS at 18:28

## 2024-04-29 RX ADMIN — DOXAZOSIN 4 MG: 4 TABLET ORAL at 21:15

## 2024-04-29 RX ADMIN — CILOSTAZOL 100 MG: 100 TABLET ORAL at 21:15

## 2024-04-29 RX ADMIN — CEFEPIME 2000 MG: 2 INJECTION, POWDER, FOR SOLUTION INTRAVENOUS at 18:26

## 2024-04-29 RX ADMIN — SODIUM CHLORIDE, PRESERVATIVE FREE 10 ML: 5 INJECTION INTRAVENOUS at 21:15

## 2024-04-29 RX ADMIN — ISOSORBIDE MONONITRATE 30 MG: 30 TABLET, EXTENDED RELEASE ORAL at 21:14

## 2024-04-29 ASSESSMENT — LIFESTYLE VARIABLES
HOW OFTEN DO YOU HAVE A DRINK CONTAINING ALCOHOL: 2-4 TIMES A MONTH
HOW MANY STANDARD DRINKS CONTAINING ALCOHOL DO YOU HAVE ON A TYPICAL DAY: 1 OR 2

## 2024-04-29 NOTE — H&P
History & Physical  [facility]    Adult Hospitalist      Name: Bruno Bustamante  MRN: [id number]     Acct: [id number]  Room: 2025/2025-01    Admit Date: 4/29/2024  9:48 AM  PCP: Ko Bauer MD    Primary Problem  Principal Problem:    Cellulitis  Resolved Problems:    * No resolved hospital problems. *        Assesment/ plan:     Patient admitted to MedSur floor        Bilateral lower extremity cellulitis  Lactate and procalcitonin  Blood culture  IV cefepime  ID consult      Stasis dermatitis  Patient has chronic stasis dermatitis      Acute on chronic diastolic CHF  Patient presented with bilateral lower extremity edema-worsening  IV diuretics  Monitor creatinine  Echocardiogram  Cardiology consult      Ischemic cardiomyopathy  Stable      Chronic atrial fibrillation  Patient is on warfarin  Monitor heart rate      History of DVT/PE  Stable      Hypertensive heart disease  Monitor blood pressure  Continue antihypertensives as below      Mixed hyperlipidemia  On statin      Diabetes type 2  Monitor blood glucose  SSI      Peripheral arterial disease  Stable        Continue to monitor/telemetry/CBC with differential daily/BMP daily  DVT and GI prophylaxis.  Continue medications as below      Discussed with son and daughter-in-law present at the bedside    Scheduled Meds:   allopurinol  100 mg Oral Daily    cilostazol  100 mg Oral Daily    doxazosin  4 mg Oral Nightly    isosorbide mononitrate  30 mg Oral Daily    pravastatin  20 mg Oral Nightly    spironolactone  12.5 mg Oral Daily    sodium chloride flush  10 mL IntraVENous 2 times per day    furosemide  40 mg IntraVENous Daily    cefepime  2,000 mg IntraVENous Once    Followed by    [START ON 4/30/2024] cefepime  1,000 mg IntraVENous Q24H    warfarin placeholder: dosing by pharmacy   Other RX Placeholder     Continuous Infusions:   sodium chloride       PRN Meds:  sodium chloride flush, 10 mL, PRN  sodium chloride, , PRN  ondansetron, 4 mg, Q8H PRN    editing.     Note was updated later by me after  physical examination and  completion of the assessment.

## 2024-04-29 NOTE — PROGRESS NOTES
Patient transferred from ED with family at bedside. Patient resting in bed with side rails up x2, bed alarm on, call light in reach. Orders released and reviewed.

## 2024-04-29 NOTE — ED NOTES
ED to inpatient nurses report     Chief Complaint   Patient presents with    Leg Swelling      Present to ED from [facility]  LOC: alert and orientated to name, place, date  Vital signs   Vitals:    04/29/24 0950 04/29/24 1300 04/29/24 1335   BP: (!) 102/59 (!) 114/52 (!) 114/50   Pulse: 78 99 (!) 103   Resp: 16 14 18   Temp: 98.6 °F (37 °C)     TempSrc: Oral     SpO2: 98% 94% 94%   Weight: 80.7 kg (178 lb)     Height: 1.803 m (5' 11\")        Oxygen Baseline RA    Current needs required RA   SEPSIS:   [] Lactate X 2 ordered (Yes or No)  [] Antibiotics given (Yes or No)  [] IV Fluids ordered (Yes or No)             [] 2nd IV completed (Yes or No)  [] Hourly Vital Signs (Validated)  [] Outstanding Orders:     LDAs:   Peripheral IV 04/29/24 Proximal;Right;Anterior Forearm (Active)   Site Assessment Clean, dry & intact 04/29/24 1019     Mobility:  Uses walker at home   Fall Risk:    Pending ED orders: none   Present condition: stable   Code Status: DNR CCA was last code status, unsure if admitting team reviewed or not  Consults: PHARMACY TO DOSE VANCOMYCIN  IP CONSULT TO INTERNAL MEDICINE  IP CONSULT TO CARDIOLOGY  IP CONSULT TO INFECTIOUS DISEASES  PHARMACY TO DOSE WARFARIN  IP CONSULT TO SOCIAL WORK  []  Hospitalist  Completed  [] yes [] no Who:   []  Medicine  Completed  [] yes [] No Who:   []  Cardiology  Completed  [] yes [] No Who:   []  GI   Completed  [] yes [] No Who:   []  Neurology  Completed  [] yes [] No Who:   []  Nephrology Completed  [] yes [] No Who:    []  Vascular  Completed  [] yes [] No Who:   []  Ortho  Completed  [] yes [] No Who:     []  Surgery  Completed  [] yes [] No Who:    []  Urology  Completed  [] yes [] No Who:    []  CT Surgery Completed  [] yes [] No Who:   []  Podiatry  Completed  [] yes [] No Who:    []  Other    Completed  [] yes [] No Who:  Interventions: IV, EKG, labs, cultures, vanco   Important Events: See chart        Electronically signed by Basilio Roberts RN on 4/29/2024

## 2024-04-29 NOTE — CONSULTS
Warfarin Dosing - Pharmacy Consult Note  Consulting Provider: Dr. Galindo  Indication:  History of  VTE/PE and Atrial Fibrillation  Warfarin Dose prior to admission: 2 mg Mon/Fri, 4 mg all other days    Concurrent anticoagulants/antiplatelets: none   Significant Drug Interactions: No obvious interactions  Recent Labs     04/29/24  1010   INR 3.1   HGB 9.2*   PLT 65*     Recent warfarin administrations        No warfarin orders with administrations found.            Orders not given:            warfarin placeholder: dosing by pharmacy                   Date   INR    Dose  4/29       3.1    Assessment/Plan  (Goal INR: 2 - 3)  INR supra therapeutic, hold warfarin today. Follow INR in AM.     Active problem list reviewed.  INR orders are placed.  Chart reviewed for pertinent labs, drug/diet interactions, and past doses.  Documentation of patient's clinical condition was reviewed.    Pharmacy Dosing:  Pharmacy will continue to follow.

## 2024-04-29 NOTE — CARE COORDINATION
Case Management Assessment  Initial Evaluation    Date/Time of Evaluation: 4/29/2024 5:33 PM  Assessment Completed by: MAMI Rothman    If patient is discharged prior to next notation, then this note serves as note for discharge by case management.    Patient Name: Bruno Bustamante                   YOB: 1930  Diagnosis: Cellulitis [L03.90]                   Date / Time: 4/29/2024  9:48 AM    Patient Admission Status: Inpatient   Readmission Risk (Low < 19, Mod (19-27), High > 27): Readmission Risk Score: 14.4    Current PCP: Ko Bauer MD  PCP verified by CM? Yes    Chart Reviewed: Yes      History Provided by: Child/Family  Patient Orientation: Other (see comment) (assessed with family)    Patient Cognition: Alert    Hospitalization in the last 30 days (Readmission):  No    If yes, Readmission Assessment in  Navigator will be completed.    Advance Directives:      Code Status: DNR-CCA   Patient's Primary Decision Maker is: Legal Next of Kin      Discharge Planning:    Patient lives with: Alone Type of Home: Independent Living  Primary Care Giver: Self  Patient Support Systems include: Children   Current Financial resources: Medicare  Current community resources: Assisted Living  Current services prior to admission: None            Current DME: Walker, Cane            Type of Home Care services:  Aide Services    ADLS  Prior functional level: Assistance with the following:, Cooking, Housework, Shopping  Current functional level: Assistance with the following:, Cooking, Housework, Shopping    PT AM-PAC:   /24  OT AM-PAC:   /24    Family can provide assistance at DC: No  Would you like Case Management to discuss the discharge plan with any other family members/significant others, and if so, who? Yes (son)  Plans to Return to Present Housing: Yes  Other Identified Issues/Barriers to RETURNING to current housing: cellulitis  Potential Assistance needed at discharge: N/A

## 2024-04-29 NOTE — PROGRESS NOTES
Pharmacy Note - Renal Dosing and Extended Infusion Beta-Lactam Adjustment    Cefepime 2000mg Q12h for treatment of Skin and soft tissue infection. Per [facility] Renal Dose Adjustment Policy and Extended Infusion Beta-Lactam Policy, cefepime will be changed to 2000mg load followed by 1000mg Q24h extended infusion    Estimated Creatinine Clearance: Estimated Creatinine Clearance: 25 mL/min (A) (based on SCr of 2 mg/dL (H)).    BMI: Body mass index is 24.83 kg/m².    Please call with any questions.    Thank you,    Eugenie Rangel, [facility]

## 2024-04-29 NOTE — ED PROVIDER NOTES
EMERGENCY DEPARTMENT ENCOUNTER    Pt Name: Bruno Bustamante  MRN: [id number]  Birthdate 6/21/1930  Date of evaluation: 4/29/24  CHIEF COMPLAINT       Chief Complaint   Patient presents with    Leg Swelling     HISTORY OF PRESENT ILLNESS   HPI   The patient is a 89-year-old male with a history of atrial fibrillation cardiomyopathy CHF who presented to the emergency department secondary to bilateral leg swelling.  Patient stated over the last several days she has had increased pain and swelling in his bilateral lower extremities.  No redness to his legs as well.  Denies trauma or injury.      REVIEW OF SYSTEMS     Review of Systems   Constitutional:  Negative for chills, diaphoresis and fever.   HENT:  Negative for congestion, ear pain and facial swelling.    Eyes:  Negative for pain, discharge and visual disturbance.   Respiratory:  Negative for chest tightness and shortness of breath.    Cardiovascular:  Positive for leg swelling. Negative for chest pain and palpitations.   Gastrointestinal:  Negative for abdominal distention and abdominal pain.   Genitourinary:  Negative for difficulty urinating and flank pain.   Musculoskeletal:  Negative for back pain.   Skin:  Negative for wound.   Neurological:  Negative for dizziness, light-headedness and headaches.     PASTMEDICAL HISTORY     Past Medical History:   Diagnosis Date    Achilles tendon pain 1980's    Repair, Had DVT post surgery    Anemia     Atrial fibrillation (HCC)     Cardiomyopathy (HCC)     Cardiomyopathy (HCC)     Cataract     CHF (congestive heart failure) ([facility])     Chronic kidney disease     Stage 3    COPD (chronic obstructive pulmonary disease) ([facility])     Dental disease     Diabetes mellitus (HCC)     Type 2     Diabetic neuropathy (HCC)     Bilateral Lower legs distal to knees    DVT (deep venous thrombosis) ([facility]) 1980's    With PE     Edema     Bilateral Legs    Entropion 06/2016    Right Lower Lid    Gout     Hearing loss     Hyperlipidemia      Refills: 0      mirtazapine (REMERON) 7.5 MG tablet Take 1 tablet by mouth nightly  Qty: 30 tablet, Refills: 0      isosorbide mononitrate (IMDUR) 60 MG extended release tablet Take 0.5 tablets by mouth daily      darbepoetin calos-polysorbate (ARANESP) 40 MCG/0.4ML SOSY injection Inject 0.4 mLs as directed every 30 days      warfarin (COUMADIN) 2 MG tablet Take 1 tablet by mouth Twice a Week Monday AND FRIDAY      Cholecalciferol (VITAMIN D3) 1.25 MG (12101 UT) TABS Take 1 tablet by mouth every 30 days Last Friday of the month.      pravastatin (PRAVACHOL) 40 MG tablet Take 0.5 tablets by mouth at bedtime           ALLERGIES     is allergic to amoxicillin, bumetanide, cephalexin, metoprolol, omeprazole, metformin, and simvastatin.  FAMILY HISTORY     has no family status information on file.      SOCIAL HISTORY       Social History     Tobacco Use    Smoking status: Never    Smokeless tobacco: Never   Vaping Use    Vaping Use: Never used   Substance Use Topics    Alcohol use: Yes     Alcohol/week: 7.0 standard drinks of alcohol     Types: 1 Cans of beer, 6 Shots of liquor per week     Comment: 5 beers a week    Drug use: No     PHYSICAL EXAM     INITIAL VITALS: /79   Pulse (!) 109   Temp 97.5 °F (36.4 °C) (Oral)   Resp 20   Ht 1.778 m (5' 10\")   Wt 87.5 kg (192 lb 14.4 oz)   SpO2 96%   BMI 27.68 kg/m²    Physical Exam  Vitals and nursing note reviewed.   Constitutional:       General: He is not in acute distress.     Appearance: He is well-developed. He is not diaphoretic.   HENT:      Head: Normocephalic and atraumatic.   Eyes:      Pupils: Pupils are equal, round, and reactive to light.   Cardiovascular:      Rate and Rhythm: Normal rate and regular rhythm.   Pulmonary:      Effort: Pulmonary effort is normal.      Breath sounds: Normal breath sounds.   Abdominal:      General: Bowel sounds are normal.      Palpations: Abdomen is soft.   Musculoskeletal:         General: Normal range of motion.

## 2024-04-29 NOTE — PROGRESS NOTES
[] Medication Reconciliation was completed and the patient's home medication list was verified. The Med List Status has been marked \"Complete\". The following sources were used to assist with Medication Reconciliation:    [] Patient had a list of medications which was transcribed into the EHR.    [] Patient provided bottles of their medications    [] Home medications reviewed and confirmed with     [] Contacted patient's pharmacy to confirm home medications    [] Contacted patient's physician office to confirm home medications    [] Medical Records from another facility and/or Care Everywhere were reviewed    OR    [x] There are one or more home medications that need clarification before Medication Reconciliation can be completed. The Med List Status has been marked as In Progress. To assist with Home Medication Reconciliation the following actions have been taken:    [x] Pharmacy medication reconciliation service requested. (Note: This can be done by sending a Perfect Serve message to [facility] Pharmacist or by phoning [phone].)    [] Family requested to bring medications into the hospital    [] Family requested to call hospital with medication list    [] Message left with physician office    [] Request for medical records made to     [] Other

## 2024-04-29 NOTE — CONSULTS
Pharmacy dosing of initial vancomycin ordered by ED provider.    Wt Readings from Last 1 Encounters:   04/29/24 80.7 kg (178 lb)       2000 mg ordered x 1.    Pharmacy is waiting to see if vancomycin therapy is continued or stopped/changed by the admitting physician.

## 2024-04-30 PROBLEM — L03.115 BILATERAL LOWER LEG CELLULITIS: Status: ACTIVE | Noted: 2024-04-30

## 2024-04-30 PROBLEM — L03.116 BILATERAL LOWER LEG CELLULITIS: Status: ACTIVE | Noted: 2024-04-30

## 2024-04-30 LAB
ANION GAP SERPL CALCULATED.3IONS-SCNC: 12 MMOL/L (ref 9–17)
BASOPHILS # BLD: 0 K/UL (ref 0–0.2)
BASOPHILS NFR BLD: 0 %
BNP SERPL-MCNC: ABNORMAL PG/ML
BUN SERPL-MCNC: 43 MG/DL (ref 8–23)
BUN/CREAT SERPL: 24 (ref 9–20)
CALCIUM SERPL-MCNC: 8.3 MG/DL (ref 8.6–10.4)
CHLORIDE SERPL-SCNC: 98 MMOL/L (ref 98–107)
CO2 SERPL-SCNC: 22 MMOL/L (ref 20–31)
CREAT SERPL-MCNC: 1.8 MG/DL (ref 0.7–1.2)
EOSINOPHIL # BLD: 0.06 K/UL (ref 0–0.4)
EOSINOPHILS RELATIVE PERCENT: 1 % (ref 1–4)
ERYTHROCYTE [DISTWIDTH] IN BLOOD BY AUTOMATED COUNT: 16.9 % (ref 11.8–14.4)
GFR SERPL CREATININE-BSD FRML MDRD: 35 ML/MIN/1.73M2
GLUCOSE BLD-MCNC: 160 MG/DL (ref 75–110)
GLUCOSE BLD-MCNC: 175 MG/DL (ref 75–110)
GLUCOSE BLD-MCNC: 212 MG/DL (ref 75–110)
GLUCOSE BLD-MCNC: 230 MG/DL (ref 75–110)
GLUCOSE SERPL-MCNC: 160 MG/DL (ref 70–99)
HCT VFR BLD AUTO: 26.7 % (ref 40.7–50.3)
HGB BLD-MCNC: 8.5 G/DL (ref 13–17)
IMM GRANULOCYTES # BLD AUTO: 0.06 K/UL (ref 0–0.3)
IMM GRANULOCYTES NFR BLD: 1 %
INR PPP: 3.3
LYMPHOCYTES NFR BLD: 0.58 K/UL (ref 1–4.8)
LYMPHOCYTES RELATIVE PERCENT: 10 % (ref 24–44)
MCH RBC QN AUTO: 29.3 PG (ref 25.2–33.5)
MCHC RBC AUTO-ENTMCNC: 31.8 G/DL (ref 28.4–34.8)
MCV RBC AUTO: 92.1 FL (ref 82.6–102.9)
MONOCYTES NFR BLD: 0.64 K/UL (ref 0.2–0.8)
MONOCYTES NFR BLD: 11 % (ref 1–7)
NEUTROPHILS NFR BLD: 77 % (ref 36–66)
NEUTS SEG NFR BLD: 4.46 K/UL (ref 1.8–7.7)
NRBC BLD-RTO: 0 PER 100 WBC
PLATELET # BLD AUTO: 61 K/UL (ref 138–453)
PMV BLD AUTO: 11.4 FL (ref 8.1–13.5)
POTASSIUM SERPL-SCNC: 3.7 MMOL/L (ref 3.7–5.3)
PROTHROMBIN TIME: 33.2 SEC (ref 11.5–14.2)
RBC # BLD AUTO: 2.9 M/UL (ref 4.21–5.77)
SODIUM SERPL-SCNC: 132 MMOL/L (ref 135–144)
WBC OTHER # BLD: 5.8 K/UL (ref 3.5–11.3)

## 2024-04-30 PROCEDURE — 99222 1ST HOSP IP/OBS MODERATE 55: CPT | Performed by: INTERNAL MEDICINE

## 2024-04-30 PROCEDURE — 97535 SELF CARE MNGMENT TRAINING: CPT

## 2024-04-30 PROCEDURE — 85025 COMPLETE CBC W/AUTO DIFF WBC: CPT

## 2024-04-30 PROCEDURE — 36415 COLL VENOUS BLD VENIPUNCTURE: CPT

## 2024-04-30 PROCEDURE — 97530 THERAPEUTIC ACTIVITIES: CPT

## 2024-04-30 PROCEDURE — 6360000002 HC RX W HCPCS: Performed by: HOSPITALIST

## 2024-04-30 PROCEDURE — 97162 PT EVAL MOD COMPLEX 30 MIN: CPT

## 2024-04-30 PROCEDURE — 99223 1ST HOSP IP/OBS HIGH 75: CPT | Performed by: NURSE PRACTITIONER

## 2024-04-30 PROCEDURE — 85610 PROTHROMBIN TIME: CPT

## 2024-04-30 PROCEDURE — 82947 ASSAY GLUCOSE BLOOD QUANT: CPT

## 2024-04-30 PROCEDURE — 83880 ASSAY OF NATRIURETIC PEPTIDE: CPT

## 2024-04-30 PROCEDURE — 97166 OT EVAL MOD COMPLEX 45 MIN: CPT

## 2024-04-30 PROCEDURE — 2580000003 HC RX 258: Performed by: HOSPITALIST

## 2024-04-30 PROCEDURE — 6370000000 HC RX 637 (ALT 250 FOR IP): Performed by: HOSPITALIST

## 2024-04-30 PROCEDURE — 1200000000 HC SEMI PRIVATE

## 2024-04-30 PROCEDURE — 80048 BASIC METABOLIC PNL TOTAL CA: CPT

## 2024-04-30 RX ADMIN — INSULIN LISPRO 2 UNITS: 100 INJECTION, SOLUTION INTRAVENOUS; SUBCUTANEOUS at 12:25

## 2024-04-30 RX ADMIN — CILOSTAZOL 100 MG: 100 TABLET ORAL at 10:32

## 2024-04-30 RX ADMIN — ALLOPURINOL 100 MG: 100 TABLET ORAL at 10:32

## 2024-04-30 RX ADMIN — PRAVASTATIN SODIUM 20 MG: 20 TABLET ORAL at 20:01

## 2024-04-30 RX ADMIN — Medication 12.5 MG: at 10:32

## 2024-04-30 RX ADMIN — FUROSEMIDE 40 MG: 10 INJECTION, SOLUTION INTRAMUSCULAR; INTRAVENOUS at 10:32

## 2024-04-30 RX ADMIN — SODIUM CHLORIDE, PRESERVATIVE FREE 10 ML: 5 INJECTION INTRAVENOUS at 20:01

## 2024-04-30 RX ADMIN — DOXAZOSIN 4 MG: 4 TABLET ORAL at 20:01

## 2024-04-30 RX ADMIN — SODIUM CHLORIDE, PRESERVATIVE FREE 10 ML: 5 INJECTION INTRAVENOUS at 10:32

## 2024-04-30 RX ADMIN — ISOSORBIDE MONONITRATE 30 MG: 30 TABLET, EXTENDED RELEASE ORAL at 10:31

## 2024-04-30 NOTE — PROGRESS NOTES
Occupational Therapy  Facility/Department: [facility] MED SURG  Occupational Therapy Initial Assessment    Name: Bruno Bustamante  : 1930  MRN: [id number]  Date of Service: 2024    RN Randi reports patient is medically stable for therapy treatment this date.    Chart reviewed prior to treatment and patient is agreeable for therapy.  All lines intact and patient positioned comfortably at end of treatment.  All patient needs addressed prior to ending therapy session.      Discharge Recommendations:  Patient would benefit from continued therapy after discharge  Pt currently functioning below baseline.  Recommend daily inpatient skilled therapy at time of discharge to maximize long term outcomes and prevent re-admission. Please refer to AM-PAC score for current level of function.    OT Equipment Recommendations  Equipment Needed: Yes  Mobility Devices: ADL Assistive Devices  ADL Assistive Devices: Long-handled Shoe Horn;Long-handled Sponge;Reacher;Sock-Aid Hard       Patient Diagnosis(es): The encounter diagnosis was Bilateral lower leg cellulitis.  Past Medical History:  has a past medical history of Achilles tendon pain, Anemia, Atrial fibrillation (HCC), Cardiomyopathy (HCC), Cardiomyopathy (HCC), Cataract, CHF (congestive heart failure) (HCC), Chronic kidney disease, COPD (chronic obstructive pulmonary disease) (HCC), Dental disease, Diabetes mellitus (HCC), Diabetic neuropathy (HCC), DVT (deep venous thrombosis) ([facility]), Edema, Entropion, Gout, Hearing loss, Hyperlipidemia, Hypertension, and Ribs, multiple fractures.  Past Surgical History:  has a past surgical history that includes hernia repair (); lipoma resection (); Colonoscopy (); Achilles tendon surgery (Left, ); Tonsillectomy (s); and Eye surgery (Right, 2016).     PER H&P:  Bruno Bustamante is a 89 y.o.  male who presents with Leg Swelling     89-year-old gentleman presented to ER due to worsening peripheral edema and shortness  physical assistance required to guide into proper postion on toilet as pt kept attempting to sit off toilet towards R side. Mod vebral cues given for use of grab bar, pacing self, pursed lip breathing, awareness/assist with lines all to increase safety.      AROM: Within functional limits  Strength: Generally decreased, functional (BUE ~4-/5)  Coordination: Generally decreased, functional (decreased speed/accuracy during B finger to thumb opposition)  Tone: Normal  Sensation: Intact      ADL  Feeding: Setup  Grooming: Setup;Stand by assistance (Pt completed hand hygiene while seated)  UE Bathing: Setup;Minimal assistance  LE Bathing: Setup;Maximum assistance  UE Dressing: Setup;Minimal assistance (to tie/adjust hosp gown while seated on EOB for modesty)  LE Dressing: Setup;Maximum assistance;Dependent/Total (for donning B socks while seated on EOB. Pt reporting difficulites with LB dressing at baseline)  Toileting: Moderate assistance (Pt able to complete dariana care after BM while seated on toilet, required cues for initiation, continuation and throughouness throughout. Pt also required assistance for clothing mgmt up and down)  Functional Mobility: Minimal assistance;Moderate assistance  Functional Mobility Skilled Clinical Factors: Pt completed functional mobility from bed to toilet, toilet to bedside chair with RW. Pt required Max verbal cues for pursed lip breathing, staying within RW, upright posture, scanning environment and pacing self all to increase safety.  Additional Comments: Pts ADL performance is limited by decreased activity tolerance, generalized weakness, fatigue and decreased safety/balance.      Bed mobility  Supine to Sit: Minimal assistance (HOB elevated ~45 degrees; bedrails used for UE assist)  Sit to Supine:  (Not assessed this date.  Pt retired in bedside chair at end of session.)  Scooting: Minimal assistance  Bed Mobility Comments: Pt completed bed mobility with parasiuclites this date. Pt

## 2024-04-30 NOTE — PROGRESS NOTES
Patient receives Sacrament of the Sick (anointing) from [facility].    Spiritual Care will follow as needed.  (writer charting for contract [facility].)    04/30/24 0911   Encounter Summary   Encounter Overview/Reason  Encounter   Service Provided For Patient   Referral/Consult From Rounding   Last Encounter  04/30/24   Rituals, Rites and Sacraments   Type Sacrament of Sick;Anointing

## 2024-04-30 NOTE — PROGRESS NOTES
Progress note  [facility].,    Adult Hospitalist      Name: Bruno Bustamante  MRN: [id number]     Acct: [id number]  Room: 2025/2025-01    Admit Date: 4/29/2024  9:48 AM  PCP: Ko Bauer MD    Primary Problem  Principal Problem:    Cellulitis  Active Problems:    Bilateral lower leg cellulitis  Resolved Problems:    * No resolved hospital problems. *        Assesment/ plan:     Patient admitted to MedSur floor        Bilateral lower extremity cellulitis  Lactate and procalcitonin  Blood culture  IV cefepime discontinued by ID  ID consult      Stasis dermatitis  Patient has chronic stasis dermatitis  Venous Doppler lower extremity negative for DVT    Acute on chronic diastolic CHF  Patient presented with bilateral lower extremity edema-worsening  IV diuretics  Monitor creatinine  Echocardiogram  Cardiology consult      Ischemic cardiomyopathy  Stable      Chronic atrial fibrillation  Patient is on warfarin  Monitor heart rate      History of DVT/PE  Stable      Hypertensive heart disease  Monitor blood pressure  Continue antihypertensives as below      Mixed hyperlipidemia  On statin      Diabetes type 2  Monitor blood glucose  SSI      Peripheral arterial disease  Stable        Continue to monitor/telemetry/CBC with differential daily/BMP daily  DVT and GI prophylaxis.  Continue medications as below      Discussed with son and daughter-in-law present at the bedside    Scheduled Meds:   allopurinol  100 mg Oral Daily    cilostazol  100 mg Oral Daily    doxazosin  4 mg Oral Nightly    isosorbide mononitrate  30 mg Oral Daily    pravastatin  20 mg Oral Nightly    spironolactone  12.5 mg Oral Daily    sodium chloride flush  10 mL IntraVENous 2 times per day    furosemide  40 mg IntraVENous Daily    warfarin placeholder: dosing by pharmacy   Other RX Placeholder    insulin lispro  0-8 Units SubCUTAneous TID     insulin lispro  0-4 Units SubCUTAneous Nightly     Continuous Infusions:   sodium chloride               All radiological studies reviewed                Code Status:  DNR-CCA    Electronically signed by Vicky Galindo MD on 4/30/2024 at 5:52 PM     Copy sent to Ko Santos MD    This note was created with the assistance of a speech-recognition program.  Although the intention is to generate a document that actually reflects the content of the visit, no guarantees can be provided that every mistake has been identified and corrected by editing.     Note was updated later by me after  physical examination and  completion of the assessment.

## 2024-04-30 NOTE — PROGRESS NOTES
Warfarin Dosing - Pharmacy Consult Note  Consulting Provider: Dr. Vicky Galindo   Indication:  History of  VTE/PE and Atrial Fibrillation  Warfarin Dose prior to admission: 2mg Mon/Fri, 4mg all other days   Concurrent anticoagulants/antiplatelets: Pletal 100mg   Significant Drug Interactions: No obvious interactions  Recent Labs     04/29/24  1010 04/30/24  0558   INR 3.1 3.3   HGB 9.2* 8.5*   PLT 65* 61*     Recent warfarin administrations        No warfarin orders with administrations found.            Orders not given:            warfarin placeholder: dosing by pharmacy                   Date   INR    Dose  4/29       3.1       none  4/30       3.3       none    Assessment/Plan  (Goal INR: 2 - 3)  INR still above goal. No coumadin today. INR in AM.     Active problem list reviewed.  INR orders are placed.  Chart reviewed for pertinent labs, drug/diet interactions, and past doses.  Documentation of patient's clinical condition was reviewed.    Pharmacy Dosing:  Pharmacy will continue to follow.

## 2024-04-30 NOTE — CONSULTS
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  I would recommend diuretic therapy, compression wrapping  I will sign off and please call if I can be of any further assistance    Chief complaint/reason for consultation:     Question Answer   Reason for Consult? Cellulitis     History of Present Illness:   Bruno Bustamante is a 89 y.o.-year-old male who was initially admitted on 4/29/2024.   Bruno has multiple medical issues including diabetes mellitus type 2 with associated neuropathy, hypertension, hyperlipidemia, atrial fibrillation, congestive heart failure, chronic kidney disease stage III, COPD, cardiomyopathy, history of DVT/PE, chronic edema in the lower legs bilaterally, history of gout.    The patient reports that he has had leg swelling for at least a month now at home but over the last several days has had increased pain and swelling which has brought him to the emergency room for evaluation.  The patient was diagnosed with acute on chronic diastolic heart failure started on diuretic therapy and there was concern for bilateral lower extremity cellulitis and started on IV antimicrobial therapy with cefepime    I was asked to evaluate and help with antibiotic choice.  The patient does not  Date/Time     03/29/2024 11:36 AM       No results found for: \"SEDRATE\"    Lab Results   Component Value Date/Time    COVID19 Not Detected 11/22/2022 02:55 PM     No results found for requested labs within last 30 days.       Imaging Studies:   Vascular duplex lower extremity venous bilateral    Result Date: 4/29/2024    No evidence of deep vein or superficial vein thrombosis in the bilateral lower extremities.     XR CHEST PORTABLE    Result Date: 4/29/2024  EXAMINATION: ONE XRAY VIEW OF THE CHEST 4/29/2024 10:10 am COMPARISON: None. HISTORY: ORDERING SYSTEM PROVIDED HISTORY: leg swelling TECHNOLOGIST PROVIDED HISTORY: leg swelling Reason for Exam: leg swelling FINDINGS: There are bilateral effusions.  There are scattered infiltrates.  There is no pneumothorax.  The heart is enlarged.  The upper abdomen is unremarkable. The extrathoracic soft tissues are unremarkable.     Cardiomegaly with bilateral effusions and scattered infiltrates.       Cultures:     Procedure Component Value Units Date/Time   Blood Culture 1 [[id number]] Collected: 04/29/24 1019   Order Status: Completed Specimen: Blood Updated: 04/30/24 0103    Specimen Description .BLOOD    Special Requests LFA 12ML    Culture NO GROWTH 12 HOURS   Culture, Blood 2 [[id number]] Collected: 04/29/24 1010   Order Status: Completed Specimen: Blood Updated: 04/30/24 0102    Specimen Description .BLOOD    Special Requests RFA 12ML    Culture NO GROWTH 12 HOURS       Electronically signed by Dania Fernandez MD on 4/30/2024 at 10:13 AM      [facility]  Dania Fernandez MD  Perfect Serve messaging  OFFICE: [phone]    Thank you for allowing us to participate in the care of this patient. Please call with questions.     This note is created with the assistance of a speech recognition program.  While intending to generate a document that actually reflects the content of the visit, the document can still have some errors

## 2024-04-30 NOTE — PROGRESS NOTES
Physical Therapy  Facility/Department: [facility] MED SURG  Physical Therapy Initial Assessment    Name: Bruno Bustamante  : 1930  MRN: [id number]  Date of Service: 2024  RN Randi reports patient is medically stable for therapy treatment this date.    Chart reviewed prior to treatment and patient is agreeable for therapy.  All lines intact and patient positioned comfortably at end of treatment.  All patient needs addressed prior to ending therapy session.      Discharge Recommendations:  Patient would benefit from continued therapy after discharge   Pt currently functioning below baseline.  Recommend daily inpatient skilled therapy at time of discharge to maximize long term outcomes and prevent re-admission. Please refer to AM-PAC score for current level of function.    Per H&P: \"Bruno Bustamante is a 89 y.o.  male who presents with Leg Swelling  89-year-old gentleman presented to ER due to worsening peripheral edema and shortness of breath.  Past medical history significant for CHF, stasis dermatitis.  Family also reported that they have noticed worsening erythema and swelling.  Patient denies any fever, chills, cough, cold, changes in urination, bowel habit.\"      Patient Diagnosis(es): The encounter diagnosis was Bilateral lower leg cellulitis.  Past Medical History:  has a past medical history of Achilles tendon pain, Anemia, Atrial fibrillation (HCC), Cardiomyopathy (HCC), Cardiomyopathy (HCC), Cataract, CHF (congestive heart failure) (HCC), Chronic kidney disease, COPD (chronic obstructive pulmonary disease) (HCC), Dental disease, Diabetes mellitus (HCC), Diabetic neuropathy (HCC), DVT (deep venous thrombosis) (HCC), Edema, Entropion, Gout, Hearing loss, Hyperlipidemia, Hypertension, and Ribs, multiple fractures.  Past Surgical History:  has a past surgical history that includes hernia repair (); lipoma resection (); Colonoscopy (); Achilles tendon surgery (Left, ); Tonsillectomy ();  transfers independently  Short Term Goal 3: Pt to ambulate at least 100ft w/ RW independently  Short Term Goal 4: Pt to actively participate in at least 30 minutes of physical therapy for ther act, ther ex, balance, gait, and endurance training  Short Term Goal 5: Pt to be indep w/ pressure relief techniques in order to maintain skin integrity and prevent pressure injuries  Patient Goals   Patient Goals : To feel better, to go home       Education  Patient Education  Education Given To: Patient  Education Provided: Role of Therapy;Plan of Care;Transfer Training;Energy Conservation;Fall Prevention Strategies  Education Provided Comments: Pt educated on: purpose of acute PT eval, importance of continued mobility throughout admission to prevent sedentary complications, fall risk prevention, pursed lip breathing, safe transfers & ambulation w/ RW, and PT POC.  Pt w/ fair return demo.  Pt would benefit from continued reinforcement of education.  Education Method: Verbal;Demonstration  Education Outcome: Continued education needed;Verbalized understanding      Therapy Time   Individual Concurrent Group Co-treatment   Time In 0804         Time Out 0850         Minutes 46         Treatment time: 30 minutes     Co-treatment with OT warranted secondary to decreased safety and independence requiring 2 skilled therapy professionals to address individual discipline's goals. PT addressing pre gait trunk strengthening and transfer training.      Amelia Reid, PT

## 2024-04-30 NOTE — CONSULTS
[facility]    Consult                        Today's Date: 4/30/2024  Patient Name: Bruno Bustamante  Date of admission: 4/29/2024  9:48 AM  Patient's age: 89 y.o., 6/21/1930  Admission Dx: Cellulitis [L03.90]    Reason for Consult:  Cardiac evaluation    Requesting Physician: Vicky Galindo MD    CHIEF COMPLAINT:  CHF     History Obtained From:  patient, electronic medical record    HISTORY OF PRESENT ILLNESS:      The patient is a 89-year-old male with a history of atrial fibrillation cardiomyopathy CHF who presented to the emergency department secondary to bilateral leg swelling.  Patient stated over the last several days she has had increased pain and swelling in his bilateral lower extremities.  No redness to his legs as well.  Denies trauma or injury.     Pt seen and examined in the room.  Pt has LE wrapped bilateral.  Pt uses walker.  Afib rate stable.  Son in room.         Past Medical History:   has a past medical history of Achilles tendon pain, Anemia, Atrial fibrillation (HCC), Cardiomyopathy (HCC), Cardiomyopathy (HCC), Cataract, CHF (congestive heart failure) (HCC), Chronic kidney disease, COPD (chronic obstructive pulmonary disease) (HCC), Dental disease, Diabetes mellitus (HCC), Diabetic neuropathy (HCC), DVT (deep venous thrombosis) ([facility]), Edema, Entropion, Gout, Hearing loss, Hyperlipidemia, Hypertension, and Ribs, multiple fractures.    Past Surgical History:   has a past surgical history that includes hernia repair (1980); lipoma resection (1980's); Colonoscopy (1990's); Achilles tendon surgery (Left, 1980's); Tonsillectomy (1940's); and Eye surgery (Right, 07/12/2016).     Home Medications:    Prior to Admission medications    Medication Sig Start Date End Date Taking? Authorizing Provider   carvedilol (COREG) 6.25 MG tablet Take 1 tablet by mouth with breakfast and with evening meal  Patient not taking: Reported on 10/5/2023 9/2/23   Provider, MD Nella   spironolactone  present  Extremities:   + 2 edema  Neurological:  Alert and oriented.      DATA:    Diagnostics:    EKG: .Atrial fibrillation with PVCs and one ventricular couplet  Anteroseptal infarct , age undetermined  Abnormal ECG  ECHO 11/2022  Global left ventricular systolic function is moderately reduced  Estimated ejection fraction is 35 % . basal and mid lateral segments are  moderately to severely hypokinetic on top of global hypokinesis  Left atrium is severely dilated.  Right ventricular mild dilatation with mildly reduced systolic function.  Moderate mitral regurgitation.  Moderate tricuspid regurgitation.  Mild pulmonic insufficiency.  No vegetations noted on cardiac valves  Right atrium is moderate to severely dilated .      Past Medical History:  1. Mild LV dysfunction, EF 45% by echocardiogram, mild MR and moderate TR 2/2010  2. Mild Pulmonary HTN, PASP 44 mmHg.  3. HTN  4. Hyperlipidemia  5. DM II  6. Chronic Atrial Fibrillation with moderate enlargement of both atria, on anticoagulation. STEFANO-VASC 4  7. Renal insufficiency, follows with nephrology  8. Negative stress test for ischemia with old inferior nontransmural infarction. EF 63% in 9/2010.  9. Echocardiogram 4/2019 shows overall preserved LV systolic function, EF 50% with reduced diastolic compliance, biatrial enlargement, moderate MR and moderate-severe TR; PASP 51 mmHg.  10. Chronic anemia related to iron deficiency and his and current anticoagulation of warfarin. The patient could be a candidate for Watchman in the future if it require to stop anticoagulation  11. TTE 11/22/22- LVEF 35%, basal, mid lateral HK. Moderate MR/TR. RV dilatation            Labs:     CBC:   Recent Labs     04/29/24  1010 04/30/24  0558   WBC 5.8 5.8   HGB 9.2* 8.5*   HCT 28.9* 26.7*   PLT 65* 61*     BMP:   Recent Labs     04/29/24  1010 04/30/24  0558   * 132*   K 3.7 3.7   CO2 22 22   BUN 41* 43*   CREATININE 2.0* 1.8*   LABGLOM 31* 35*   GLUCOSE 164* 160*     BNP: No

## 2024-05-01 ENCOUNTER — APPOINTMENT (OUTPATIENT)
Age: 89
DRG: 602 | End: 2024-05-01
Payer: COMMERCIAL

## 2024-05-01 PROBLEM — L89.153 PRESSURE ULCER OF COCCYGEAL REGION, STAGE 3 (HCC): Status: ACTIVE | Noted: 2024-05-01

## 2024-05-01 LAB
ANION GAP SERPL CALCULATED.3IONS-SCNC: 11 MMOL/L (ref 9–17)
BASOPHILS # BLD: 0.05 K/UL (ref 0–0.2)
BASOPHILS NFR BLD: 1 % (ref 0–2)
BNP SERPL-MCNC: ABNORMAL PG/ML
BUN SERPL-MCNC: 44 MG/DL (ref 8–23)
BUN/CREAT SERPL: 23 (ref 9–20)
CALCIUM SERPL-MCNC: 8.4 MG/DL (ref 8.6–10.4)
CHLORIDE SERPL-SCNC: 98 MMOL/L (ref 98–107)
CO2 SERPL-SCNC: 22 MMOL/L (ref 20–31)
CREAT SERPL-MCNC: 1.9 MG/DL (ref 0.7–1.2)
ECHO AO ROOT DIAM: 3.3 CM
ECHO AO ROOT INDEX: 1.62 CM/M2
ECHO AV MEAN GRADIENT: 2 MMHG
ECHO AV MEAN VELOCITY: 0.6 M/S
ECHO AV PEAK GRADIENT: 4 MMHG
ECHO AV PEAK VELOCITY: 1 M/S
ECHO AV VELOCITY RATIO: 0.6
ECHO AV VTI: 17.9 CM
ECHO BSA: 2.06 M2
ECHO EST RA PRESSURE: 8 MMHG
ECHO LA AREA 2C: 46.7 CM2
ECHO LA AREA 4C: 45.6 CM2
ECHO LA DIAMETER INDEX: 2.94 CM/M2
ECHO LA DIAMETER: 6 CM
ECHO LA MAJOR AXIS: 9.8 CM
ECHO LA MINOR AXIS: 9.6 CM
ECHO LA TO AORTIC ROOT RATIO: 1.82
ECHO LA VOL BP: 180 ML (ref 18–58)
ECHO LA VOL MOD A2C: 186 ML (ref 18–58)
ECHO LA VOL MOD A4C: 173 ML (ref 18–58)
ECHO LA VOL/BSA BIPLANE: 88 ML/M2 (ref 16–34)
ECHO LA VOLUME INDEX MOD A2C: 91 ML/M2 (ref 16–34)
ECHO LA VOLUME INDEX MOD A4C: 85 ML/M2 (ref 16–34)
ECHO LV E' LATERAL VELOCITY: 16 CM/S
ECHO LV E' SEPTAL VELOCITY: 8 CM/S
ECHO LV FRACTIONAL SHORTENING: 14 % (ref 28–44)
ECHO LV INTERNAL DIMENSION DIASTOLE INDEX: 2.79 CM/M2
ECHO LV INTERNAL DIMENSION DIASTOLIC: 5.7 CM (ref 4.2–5.9)
ECHO LV INTERNAL DIMENSION SYSTOLIC INDEX: 2.4 CM/M2
ECHO LV INTERNAL DIMENSION SYSTOLIC: 4.9 CM
ECHO LV IVSD: 0.9 CM (ref 0.6–1)
ECHO LV MASS 2D: 211.7 G (ref 88–224)
ECHO LV MASS INDEX 2D: 103.8 G/M2 (ref 49–115)
ECHO LV POSTERIOR WALL DIASTOLIC: 1 CM (ref 0.6–1)
ECHO LV RELATIVE WALL THICKNESS RATIO: 0.35
ECHO LVOT AV VTI INDEX: 0.61
ECHO LVOT MEAN GRADIENT: 1 MMHG
ECHO LVOT PEAK GRADIENT: 1 MMHG
ECHO LVOT PEAK VELOCITY: 0.6 M/S
ECHO LVOT VTI: 10.9 CM
ECHO MV E DECELERATION TIME (DT): 141 MS
ECHO MV E VELOCITY: 0.86 M/S
ECHO MV E/E' LATERAL: 5.38
ECHO MV E/E' RATIO (AVERAGED): 8.06
ECHO RA AREA 4C: 36.1 CM2
ECHO RA END SYSTOLIC VOLUME APICAL 4 CHAMBER INDEX BSA: 64 ML/M2
ECHO RA VOLUME: 131 ML
ECHO RIGHT VENTRICULAR SYSTOLIC PRESSURE (RVSP): 40 MMHG
ECHO TV REGURGITANT MAX VELOCITY: 2.81 M/S
ECHO TV REGURGITANT PEAK GRADIENT: 32 MMHG
EOSINOPHIL # BLD: 0.15 K/UL (ref 0–0.44)
EOSINOPHILS RELATIVE PERCENT: 3 % (ref 1–4)
ERYTHROCYTE [DISTWIDTH] IN BLOOD BY AUTOMATED COUNT: 17 % (ref 11.8–14.4)
GFR, ESTIMATED: 32 ML/MIN/1.73M2
GLUCOSE BLD-MCNC: 136 MG/DL (ref 75–110)
GLUCOSE BLD-MCNC: 169 MG/DL (ref 75–110)
GLUCOSE BLD-MCNC: 179 MG/DL (ref 75–110)
GLUCOSE BLD-MCNC: 290 MG/DL (ref 75–110)
GLUCOSE SERPL-MCNC: 155 MG/DL (ref 70–99)
HCT VFR BLD AUTO: 27.6 % (ref 40.7–50.3)
HGB BLD-MCNC: 8.8 G/DL (ref 13–17)
IMM GRANULOCYTES # BLD AUTO: 0.05 K/UL (ref 0–0.3)
IMM GRANULOCYTES NFR BLD: 1 %
INR PPP: 3
LYMPHOCYTES NFR BLD: 0.56 K/UL (ref 1.1–3.7)
LYMPHOCYTES RELATIVE PERCENT: 11 % (ref 24–43)
MCH RBC QN AUTO: 29.3 PG (ref 25.2–33.5)
MCHC RBC AUTO-ENTMCNC: 31.9 G/DL (ref 28.4–34.8)
MCV RBC AUTO: 92 FL (ref 82.6–102.9)
MONOCYTES NFR BLD: 0.46 K/UL (ref 0.1–1.2)
MONOCYTES NFR BLD: 9 % (ref 3–12)
NEUTROPHILS NFR BLD: 75 % (ref 36–65)
NEUTS SEG NFR BLD: 3.83 K/UL (ref 1.5–8.1)
NRBC BLD-RTO: 0 PER 100 WBC
PLATELET # BLD AUTO: ABNORMAL K/UL (ref 138–453)
PLATELET, FLUORESCENCE: 44 K/UL (ref 138–453)
PLATELETS.RETICULATED NFR BLD AUTO: 4.2 % (ref 1.1–10.3)
POTASSIUM SERPL-SCNC: 3.6 MMOL/L (ref 3.7–5.3)
PROTHROMBIN TIME: 31.1 SEC (ref 11.5–14.2)
RBC # BLD AUTO: 3 M/UL (ref 4.21–5.77)
SODIUM SERPL-SCNC: 131 MMOL/L (ref 135–144)
WBC OTHER # BLD: 5.1 K/UL (ref 3.5–11.3)

## 2024-05-01 PROCEDURE — 80048 BASIC METABOLIC PNL TOTAL CA: CPT

## 2024-05-01 PROCEDURE — 99222 1ST HOSP IP/OBS MODERATE 55: CPT

## 2024-05-01 PROCEDURE — 99233 SBSQ HOSP IP/OBS HIGH 50: CPT | Performed by: NURSE PRACTITIONER

## 2024-05-01 PROCEDURE — 97530 THERAPEUTIC ACTIVITIES: CPT

## 2024-05-01 PROCEDURE — 6360000002 HC RX W HCPCS: Performed by: HOSPITALIST

## 2024-05-01 PROCEDURE — 1200000000 HC SEMI PRIVATE

## 2024-05-01 PROCEDURE — 6370000000 HC RX 637 (ALT 250 FOR IP): Performed by: HOSPITALIST

## 2024-05-01 PROCEDURE — 93306 TTE W/DOPPLER COMPLETE: CPT

## 2024-05-01 PROCEDURE — 85610 PROTHROMBIN TIME: CPT

## 2024-05-01 PROCEDURE — 36415 COLL VENOUS BLD VENIPUNCTURE: CPT

## 2024-05-01 PROCEDURE — 97535 SELF CARE MNGMENT TRAINING: CPT

## 2024-05-01 PROCEDURE — 85025 COMPLETE CBC W/AUTO DIFF WBC: CPT

## 2024-05-01 PROCEDURE — 83880 ASSAY OF NATRIURETIC PEPTIDE: CPT

## 2024-05-01 PROCEDURE — 2580000003 HC RX 258: Performed by: HOSPITALIST

## 2024-05-01 PROCEDURE — 85055 RETICULATED PLATELET ASSAY: CPT

## 2024-05-01 PROCEDURE — 82947 ASSAY GLUCOSE BLOOD QUANT: CPT

## 2024-05-01 PROCEDURE — 6370000000 HC RX 637 (ALT 250 FOR IP): Performed by: NURSE PRACTITIONER

## 2024-05-01 RX ORDER — WARFARIN SODIUM 2 MG/1
2 TABLET ORAL
Status: COMPLETED | OUTPATIENT
Start: 2024-05-01 | End: 2024-05-01

## 2024-05-01 RX ORDER — CARVEDILOL 3.12 MG/1
3.12 TABLET ORAL 2 TIMES DAILY WITH MEALS
Status: DISCONTINUED | OUTPATIENT
Start: 2024-05-01 | End: 2024-05-08 | Stop reason: HOSPADM

## 2024-05-01 RX ADMIN — INSULIN LISPRO 4 UNITS: 100 INJECTION, SOLUTION INTRAVENOUS; SUBCUTANEOUS at 12:15

## 2024-05-01 RX ADMIN — FUROSEMIDE 40 MG: 10 INJECTION, SOLUTION INTRAMUSCULAR; INTRAVENOUS at 08:57

## 2024-05-01 RX ADMIN — DOXAZOSIN 4 MG: 4 TABLET ORAL at 21:10

## 2024-05-01 RX ADMIN — SODIUM CHLORIDE, PRESERVATIVE FREE 10 ML: 5 INJECTION INTRAVENOUS at 08:57

## 2024-05-01 RX ADMIN — WARFARIN SODIUM 2 MG: 2 TABLET ORAL at 18:06

## 2024-05-01 RX ADMIN — ALLOPURINOL 100 MG: 100 TABLET ORAL at 08:57

## 2024-05-01 RX ADMIN — PRAVASTATIN SODIUM 20 MG: 20 TABLET ORAL at 21:10

## 2024-05-01 RX ADMIN — POTASSIUM BICARBONATE 40 MEQ: 782 TABLET, EFFERVESCENT ORAL at 12:15

## 2024-05-01 RX ADMIN — Medication 12.5 MG: at 08:57

## 2024-05-01 RX ADMIN — CARVEDILOL 3.12 MG: 3.12 TABLET, FILM COATED ORAL at 12:13

## 2024-05-01 RX ADMIN — SODIUM CHLORIDE, PRESERVATIVE FREE 10 ML: 5 INJECTION INTRAVENOUS at 21:11

## 2024-05-01 RX ADMIN — ISOSORBIDE MONONITRATE 30 MG: 30 TABLET, EXTENDED RELEASE ORAL at 08:57

## 2024-05-01 NOTE — CARE COORDINATION
Social Work-Met with patient to discuss dc options. His plan is to return to [facility]. Discussed with  for possible home care needs. Meghan

## 2024-05-01 NOTE — DISCHARGE INSTR - COC
Continuity of Care Form    Patient Name: Bruno Bustamante   :  1930  MRN:  [id number]    Admit date:  2024  Discharge date:  2024    Code Status Order: DNR-CCA   Advance Directives:     Admitting Physician:  Vicky Galindo MD  PCP: Ko Bauer MD    Discharging Nurse: Odette Thompson  Discharging Hospital Unit/Room#:   Discharging Unit Phone Number: [phone]    Emergency Contact:   Extended Emergency Contact Information  Primary Emergency Contact: Heath Bustamante   [facility]  Home Phone: [phone]  Relation: Child  Secondary Emergency Contact: Robert Bustamante  Mobile Phone: [phone]  Relation: Child   needed? No    Past Surgical History:  Past Surgical History:   Procedure Laterality Date    ACHILLES TENDON SURGERY Left     Had DVT post surgery    COLONOSCOPY      EYE SURGERY Right 2016    Repair Entropion eye, Rt lower lid. Surgeon Elias Redman at [facility]    HERNIA REPAIR       2 Lt inguinal    LIPOMA RESECTION      TONSILLECTOMY         Immunization History:   Immunization History   Administered Date(s) Administered    COVID-19, PFIZER PURPLE top, DILUTE for use, (age 12 y+), 30mcg/0.3mL 2021, 2021    Influenza Virus Vaccine 2001, 10/01/2001, 10/01/2003, 2004, 2005, 2007, 10/20/2007, 2008, 10/01/2009, 09/10/2010, 10/01/2010, 10/06/2011, 2012, 10/01/2012, 2013, 10/01/2015, 10/01/2016, 10/01/2019    Influenza Whole 2002    Influenza, FLUAD, (age 65 y+), Adjuvanted, 0.5mL 10/05/2021    Influenza, FLUZONE (age 65 y+), High Dose, 0.7mL 10/06/2020    Influenza, High Dose (Fluzone 65 yrs and older) 10/17/2017    Pneumococcal Vaccine 2001    Pneumococcal, PCV-13, PREVNAR 13, (age 6w+), IM, 0.5mL 2016    Td vaccine (adult) 1996       Active Problems:  Patient Active Problem List   Diagnosis Code    GI bleed K92.2    E. coli UTI

## 2024-05-01 NOTE — CARE COORDINATION
CM called and spoke with Chantelle at [facility] and she confirmed that patient is Independent Living and able to return at discharge. CM spoke with patient and he is agreeable to [facility] at discharge. List provided for patient to review. Await patient choice.                       Post Acute Facility/Agency List     Provided patient with the following list, the list includes the overall star ratings obtained from CMS per the Medicare Web site (www.Medicare.gov):     [] Long Term Acute Care Facilities  [] Acute Inpatient Rehabilitation Facilities  [] Skilled Nursing Facilities  [x] Home Care    Provided verbal instructions on how to utilize the QR Code to obtain additional detailed star ratings from www.Medicare.gov     offered to print and provide the detailed list:    []Accepted   [x]Declined

## 2024-05-01 NOTE — PROGRESS NOTES
completed B UE AROM exercises seated and tolerated well. Skilled OT indicated to increase safety and IND with all functional tasks to ensure a safe return to PLOF.  Activity Tolerance: Patient tolerated treatment well;Patient limited by endurance  Discharge Recommendations: Patient would benefit from continued therapy after discharge  Safety Devices  Safety Devices in place: Yes  Type of devices: All fall risk precautions in place;Left in chair;Nurse notified;Call light within reach;Chair alarm in place;Gait belt    Patient Education  Education  Education Given To: Patient  Education Provided: Role of Therapy;Mobility Training;Fall Prevention Strategies;Transfer Training;Energy Conservation;Home Exercise Program;Safety;Equipment  Education Method: Demonstration;Verbal;Teach Back;Printed Information/Hand-outs  Barriers to Learning: None  Education Outcome: Verbalized understanding;Demonstrated understanding    Patient Education  - Preventing Pressure Injuries  - Understanding Energy Conservation  - Energy Conservation During Daily Tasks  - How to Prevent Falls  - Leg Elevation for Swelling  - Controlling Swelling in Your Legs  - Controlling Swelling in Your Arms  Plan  Occupational Therapy Plan  Times Per Week: 4-5x/wk 1/xday as megan  Current Treatment Recommendations: Strengthening;Balance training;Functional mobility training;Endurance training;Safety education & training;Equipment evaluation, education, & procurement;Patient/Caregiver education & training;Self-Care / ADL;Home management training;Cognitive/Perceptual training    Goals  Patient Goals   Patient goals : To go home!  Short Term Goals  Time Frame for Short Term Goals: By discharge, pt to demo  Short Term Goal 1: ADL transfers and functional mobility to SBA with use of AD as needed and proper pacing/.  Short Term Goal 2: UB ADLs to Set up and LB ADLs to Min A with use of AD/AE as needed.  Short Term Goal 3: increased B UE strength by 1/2 grade  to assist with functional tasks/I with B UE HEP with use of handouts as needed.  Short Term Goal 4: bed mobility to Mod I with use of bedrails as needed.  Short Term Goal 5: toileting to Min A with use of AD/grabbars as needed.  Long Term Goals  Long Term Goal 1: Pt/caregiver to be I with fall prevention edu, EC/WS tech, recommendations for discharge/AE, condition specific edu, pressure relief edu with use of handouts as needed.    AM-PAC Score        AM-[facility] Inpatient Daily Activity Raw Score: 18 (05/01/24 1339)  AM-PAC Inpatient ADL T-Scale Score : 38.66 (05/01/24 1339)  ADL Inpatient CMS 0-100% Score: 46.65 (05/01/24 1339)  ADL Inpatient CMS G-Code Modifier : CK (05/01/24 1339)      Therapy Time   Individual Concurrent Group Co-treatment   Time In 1316         Time Out 1355         Minutes 39                 NANCY Morales

## 2024-05-01 NOTE — PROGRESS NOTES
Physical Therapy  DATE: 2024    NAME: Bruno Bustamante  MRN: [id number]   : 1930    Patient not seen this date for Physical Therapy due to:      [] Cancel by RN or physician due to:    [] Hemodialysis    [] Critical Lab Value Level     [] Blood transfusion in progress    [] Acute or unstable cardiovascular status   _MAP < 55 or more than >115  _HR < 40 or > 130    [] Acute or unstable pulmonary status   -FiO2 > 60%   _RR < 5 or >40    _O2 sats < 85%    [] Strict Bedrest    [] Off Unit for surgery or procedure    [] Off Unit for testing       [] Pending imaging to R/O fracture    [x] Refusal by Patient (Patient just returned to bed from sitting in the chair all day. Patient requested to rest and not agreeable for any further therapy today)     [] Other      [] PT being discontinued at this time. Patient independent. No further needs.     [] PT being discontinued at this time as the patient has been transferred to hospice care. No further needs.      Shruti Martinez, PTA

## 2024-05-01 NOTE — PROGRESS NOTES
Warfarin Dosing - Pharmacy Consult Note  Consulting Provider: Vicky Galindo MD  Indication:  History of  VTE/PE and Atrial Fibrillation  Warfarin Dose prior to admission: 2mg QD   Concurrent anticoagulants/antiplatelets: cilostazol  Significant Drug Interactions:  allopurinol (home medication)  Recent Labs     04/29/24  1010 04/30/24  0558 05/01/24  0551   INR 3.1 3.3 3.0   HGB 9.2* 8.5* 8.8*   PLT 65* 61* See Reflexed IPF Result        Date   INR    Dose  4/29       3.1       none  4/30       3.3       none  5/1         3.0    Assessment/Plan  (Goal INR: 2 - 3)  INR in therapeutic range. Resume home dose. Warfarin 2mg today. INR in the morning.    Active problem list reviewed.  INR orders are placed.  Chart reviewed for pertinent labs, drug/diet interactions, and past doses.  Documentation of patient's clinical condition was reviewed.    Pharmacy Dosing:  Pharmacy will continue to follow.

## 2024-05-01 NOTE — PROGRESS NOTES
[facility]  Progress Note                   Date:   5/1/2024  Patient name: Bruno Bustamante  Date of admission:  4/29/2024  9:48 AM  MRN:   [id number]  YOB: 1930  PCP: Ko Bauer MD    Reason for Admission: Cellulitis [L03.90]    Subjective:       Clinical Changes /Abnormalities: Stable overnight. No acute CV issues/concerns. Labs, vitals, & tele reviewed. Remains Afib with rates 80-120s (with activity).     Review of Systems    Medications:   Scheduled Meds:   warfarin  2 mg Oral Once    allopurinol  100 mg Oral Daily    cilostazol  100 mg Oral Daily    doxazosin  4 mg Oral Nightly    isosorbide mononitrate  30 mg Oral Daily    pravastatin  20 mg Oral Nightly    spironolactone  12.5 mg Oral Daily    sodium chloride flush  10 mL IntraVENous 2 times per day    furosemide  40 mg IntraVENous Daily    warfarin placeholder: dosing by pharmacy   Other RX Placeholder    insulin lispro  0-8 Units SubCUTAneous TID WC    insulin lispro  0-4 Units SubCUTAneous Nightly     Continuous Infusions:   sodium chloride      dextrose       CBC:   Recent Labs     04/29/24  1010 04/30/24  0558 05/01/24  0551   WBC 5.8 5.8 5.1   HGB 9.2* 8.5* 8.8*   PLT 65* 61* See Reflexed IPF Result     BMP:    Recent Labs     04/29/24  1010 04/30/24  0558 05/01/24  0551   * 132* 131*   K 3.7 3.7 3.6*   CL 94* 98 98   CO2 22 22 22   BUN 41* 43* 44*   CREATININE 2.0* 1.8* 1.9*   GLUCOSE 164* 160* 155*     Hepatic:No results for input(s): \"AST\", \"ALT\", \"BILITOT\", \"ALKPHOS\" in the last 72 hours.    Invalid input(s): \"ALB\"  Troponin: No results for input(s): \"TROPHS\" in the last 72 hours.  BNP: No results for input(s): \"BNP\" in the last 72 hours.  Lipids: No results for input(s): \"CHOL\", \"HDL\" in the last 72 hours.    Invalid input(s): \"LDLCALCU\"  INR:   Recent Labs     04/29/24  1010 04/30/24  0558 05/01/24  0551   INR 3.1 3.3 3.0       Objective:   Vitals: /66   Pulse (!) 121   Temp 97.2 °F (36.2 °C)  dilatation    Assessment / Acute Cardiac Problems:   Acute on chronic systolic CHF exacerbation  Chronic Afib on Coumadin  DELROY on CKD  Cellulitis  Mild hypokalemia  Chronic anemia  Thrombocytopenia    Patient Active Problem List:     GI bleed     E. coli UTI     Atrial fibrillation, chronic (HCC)     Essential hypertension     Anticoagulated on Coumadin     E. coli sepsis (HCC)     Severe malnutrition (HCC)     Cellulitis     Bilateral lower leg cellulitis      Plan of Treatment:   Stable. Continue IV diuresis with close monitoring of renal function. Will f/u on repeat echo  Continue PO Aldactone  Will resume home BB, allergy to Lopressor but on Coreg at home (resume at lower dose)  Keep legs elevated. Accurate I&O and daily weight  Coumadin per pharmacy dosing  ID consulted for LE cellulitis/Dermatitis    Electronically signed by DANA Garcia CNP on 5/1/2024 at 11:17 AM  [facility]  [phone]

## 2024-05-01 NOTE — PROGRESS NOTES
[facility]   Progress Note and Procedure Note      Bruno Bustamante  MEDICAL RECORD NUMBER:  [id number]  AGE: 89 y.o.   GENDER: male  : 1930  EPISODE DATE:  2024    Subjective:     Chief Complaint   Patient presents with    Leg Swelling         HISTORY of PRESENT ILLNESS HPI     Bruno Bustamante is a 89 y.o. male who presents today for wound/ulcer evaluation.     History of Wound Context: coccyx wound has been present for about 2 months according to the patient and was initially caused when he fell on his toilet seat trying to get up from the toilet. He states that he sits in a chair a lot because he has orthopnea. Of note, he also has venous stasis disease and BLE edema.     Wound/Ulcer Pain Timing/Severity: constant  Quality of pain: burning  Severity:  2 / 10   Modifying Factors: Pain is relieved/improved with rest  Associated Signs/Symptoms: erythema and drainage    Wound/Ulcer Identification:  Ulcer Type: pressure  Contributing Factors: chronic pressure, decreased mobility, and shear force          PAST MEDICAL HISTORY        Diagnosis Date    Achilles tendon pain     Repair, Had DVT post surgery    Anemia     Atrial fibrillation (HCC)     Cardiomyopathy ([facility])     Cardiomyopathy (HCC)     Cataract     CHF (congestive heart failure) ([facility])     Chronic kidney disease     Stage 3    COPD (chronic obstructive pulmonary disease) ([facility])     Dental disease     Diabetes mellitus ([facility])     Type 2     Diabetic neuropathy ([facility])     Bilateral Lower legs distal to knees    DVT (deep venous thrombosis) ([facility])     With PE     Edema     Bilateral Legs    Entropion 2016    Right Lower Lid    Gout     Hearing loss     Hyperlipidemia     Hypertension     Ribs, multiple fractures     Per [facility] records       PAST SURGICAL HISTORY    Past Surgical History:   Procedure Laterality Date    ACHILLES TENDON SURGERY Left     Had DVT post surgery    COLONOSCOPY      EYE

## 2024-05-01 NOTE — PROGRESS NOTES
Transitions of Care Pharmacy Service   Medication Review    The patient's list of current home medications has been reviewed. The list was updated yesterday by another pharmacist after interviewing the patient and son but was awaiting [facility].    Source(s) of information: pt, son, [facility] (ph. [phone]), [facility] refill report, Epic    Other Notes [facility]farin [facility] for Afib; pt was taking 2mg daily prior to admission  Coreg 6.25mg BID has not been refilled since Sept 2023 and pt reported not currently taking at home for unclear reason. He will need a new prescription if he needs to resume at home.  Spironolactone: pt reported not taking it at home for unclear reason (last filled 90-day supply in Feb 2024); this is a current med with refills per cardiology notes in Epic         Please feel free to call me with any questions about this encounter. Thank you.    Krystle Moe [facility]   Transitions of Care Pharmacy Service  Phone:  [phone]  Fax: [phone]      Electronically signed by Krystle Moe [facility] on 5/1/2024 at 12:15 PM           Medications Prior to Admission:   spironolactone (ALDACTONE) 25 MG tablet, Take 0.5 tablets by mouth daily (Patient not taking: Reported on 4/29/2024)  cilostazol (PLETAL) 100 MG tablet, Take 1 tablet by mouth daily  allopurinol (ZYLOPRIM) 100 MG tablet, Take 1 tablet by mouth daily  doxazosin (CARDURA) 4 MG tablet, Take 1 tablet by mouth at bedtime  gabapentin (NEURONTIN) 300 MG capsule, Take 1 capsule by mouth every evening.  furosemide (LASIX) 40 MG tablet, Take 1 tablet by mouth 2 times daily  vitamin D (ERGOCALCIFEROL) 1.25 MG (04078 UT) CAPS capsule, take 1 capsule by mouth EVERY MONTH  isosorbide mononitrate (IMDUR) 60 MG extended release tablet, Take 0.5 tablets by mouth daily  darbepoetin calos-polysorbate (ARANESP) 40 MCG/0.4ML SOSY injection, Inject 0.4 mLs as directed every 30 days  warfarin

## 2024-05-02 LAB
ALBUMIN SERPL-MCNC: 3.2 G/DL (ref 3.5–5.2)
ALP SERPL-CCNC: 312 U/L (ref 40–129)
ALT SERPL-CCNC: <5 U/L (ref 5–41)
ANION GAP SERPL CALCULATED.3IONS-SCNC: 14 MMOL/L (ref 9–17)
AST SERPL-CCNC: 19 U/L
BASOPHILS # BLD: 0.06 K/UL (ref 0–0.2)
BASOPHILS NFR BLD: 1 %
BILIRUB DIRECT SERPL-MCNC: 0.4 MG/DL
BILIRUB INDIRECT SERPL-MCNC: 0.3 MG/DL (ref 0–1)
BILIRUB SERPL-MCNC: 0.7 MG/DL (ref 0.3–1.2)
BNP SERPL-MCNC: ABNORMAL PG/ML
BUN SERPL-MCNC: 46 MG/DL (ref 8–23)
BUN/CREAT SERPL: 23 (ref 9–20)
CALCIUM SERPL-MCNC: 8.8 MG/DL (ref 8.6–10.4)
CHLORIDE SERPL-SCNC: 95 MMOL/L (ref 98–107)
CO2 SERPL-SCNC: 22 MMOL/L (ref 20–31)
CREAT SERPL-MCNC: 2 MG/DL (ref 0.7–1.2)
EOSINOPHIL # BLD: 0.12 K/UL (ref 0–0.4)
EOSINOPHILS RELATIVE PERCENT: 2 % (ref 1–4)
ERYTHROCYTE [DISTWIDTH] IN BLOOD BY AUTOMATED COUNT: 17.2 % (ref 11.8–14.4)
FIBRINOGEN PPP-MCNC: 333 MG/DL (ref 179–518)
FOLATE SERPL-MCNC: 8.5 NG/ML (ref 4.8–24.2)
FREE KAPPA/LAMBDA RATIO: 0.82 (ref 0.22–1.74)
GFR, ESTIMATED: 31 ML/MIN/1.73M2
GLUCOSE BLD-MCNC: 158 MG/DL (ref 75–110)
GLUCOSE BLD-MCNC: 176 MG/DL (ref 75–110)
GLUCOSE BLD-MCNC: 188 MG/DL (ref 75–110)
GLUCOSE BLD-MCNC: 192 MG/DL (ref 75–110)
GLUCOSE SERPL-MCNC: 151 MG/DL (ref 70–99)
HCT VFR BLD AUTO: 29.7 % (ref 40.7–50.3)
HGB BLD-MCNC: 9.5 G/DL (ref 13–17)
IGA SERPL-MCNC: 207 MG/DL (ref 70–400)
IGG SERPL-MCNC: 603 MG/DL (ref 700–1600)
IGM SERPL-MCNC: 46 MG/DL (ref 40–230)
IMM GRANULOCYTES # BLD AUTO: 0.06 K/UL (ref 0–0.3)
IMM GRANULOCYTES NFR BLD: 1 %
INR PPP: 2.5
IRON SATN MFR SERPL: 18 % (ref 20–55)
IRON SERPL-MCNC: 44 UG/DL (ref 61–157)
KAPPA LC FREE SER-MCNC: 46.1 MG/L
LAMBDA LC FREE SERPL-MCNC: 56.5 MG/L (ref 4.2–27.7)
LYMPHOCYTES NFR BLD: 0.64 K/UL (ref 1–4.8)
LYMPHOCYTES RELATIVE PERCENT: 11 % (ref 24–44)
MCH RBC QN AUTO: 29.4 PG (ref 25.2–33.5)
MCHC RBC AUTO-ENTMCNC: 32 G/DL (ref 28.4–34.8)
MCV RBC AUTO: 92 FL (ref 82.6–102.9)
MONOCYTES NFR BLD: 0.52 K/UL (ref 0.2–0.8)
MONOCYTES NFR BLD: 9 % (ref 1–7)
NEUTROPHILS NFR BLD: 76 % (ref 36–66)
NEUTS SEG NFR BLD: 4.4 K/UL (ref 1.8–7.7)
NRBC BLD-RTO: 0 PER 100 WBC
PLATELET # BLD AUTO: 36 K/UL (ref 130–400)
PLATELET # BLD AUTO: ABNORMAL K/UL (ref 138–453)
PLATELET, FLUORESCENCE: 39 K/UL (ref 138–453)
PLATELETS.RETICULATED NFR BLD AUTO: 4.9 % (ref 1.1–10.3)
POTASSIUM SERPL-SCNC: 4.5 MMOL/L (ref 3.7–5.3)
PROT SERPL-MCNC: 5.3 G/DL (ref 6.4–8.3)
PROTHROMBIN TIME: 26.8 SEC (ref 11.5–14.2)
RBC # BLD AUTO: 3.23 M/UL (ref 4.21–5.77)
SODIUM SERPL-SCNC: 131 MMOL/L (ref 135–144)
TIBC SERPL-MCNC: 240 UG/DL (ref 250–450)
UNSATURATED IRON BINDING CAPACITY: 196 UG/DL (ref 112–347)
VIT B12 SERPL-MCNC: 1277 PG/ML (ref 232–1245)
WBC OTHER # BLD: 5.8 K/UL (ref 3.5–11.3)

## 2024-05-02 PROCEDURE — 83550 IRON BINDING TEST: CPT

## 2024-05-02 PROCEDURE — 6370000000 HC RX 637 (ALT 250 FOR IP): Performed by: NURSE PRACTITIONER

## 2024-05-02 PROCEDURE — 85055 RETICULATED PLATELET ASSAY: CPT

## 2024-05-02 PROCEDURE — 85384 FIBRINOGEN ACTIVITY: CPT

## 2024-05-02 PROCEDURE — 83521 IG LIGHT CHAINS FREE EACH: CPT

## 2024-05-02 PROCEDURE — 82607 VITAMIN B-12: CPT

## 2024-05-02 PROCEDURE — 2580000003 HC RX 258: Performed by: HOSPITALIST

## 2024-05-02 PROCEDURE — 85610 PROTHROMBIN TIME: CPT

## 2024-05-02 PROCEDURE — 6370000000 HC RX 637 (ALT 250 FOR IP): Performed by: INTERNAL MEDICINE

## 2024-05-02 PROCEDURE — 6360000002 HC RX W HCPCS: Performed by: INTERNAL MEDICINE

## 2024-05-02 PROCEDURE — 99233 SBSQ HOSP IP/OBS HIGH 50: CPT | Performed by: NURSE PRACTITIONER

## 2024-05-02 PROCEDURE — 82746 ASSAY OF FOLIC ACID SERUM: CPT

## 2024-05-02 PROCEDURE — 80048 BASIC METABOLIC PNL TOTAL CA: CPT

## 2024-05-02 PROCEDURE — 86334 IMMUNOFIX E-PHORESIS SERUM: CPT

## 2024-05-02 PROCEDURE — 83540 ASSAY OF IRON: CPT

## 2024-05-02 PROCEDURE — 85049 AUTOMATED PLATELET COUNT: CPT

## 2024-05-02 PROCEDURE — 82947 ASSAY GLUCOSE BLOOD QUANT: CPT

## 2024-05-02 PROCEDURE — 83880 ASSAY OF NATRIURETIC PEPTIDE: CPT

## 2024-05-02 PROCEDURE — 82784 ASSAY IGA/IGD/IGG/IGM EACH: CPT

## 2024-05-02 PROCEDURE — 1200000000 HC SEMI PRIVATE

## 2024-05-02 PROCEDURE — 94761 N-INVAS EAR/PLS OXIMETRY MLT: CPT

## 2024-05-02 PROCEDURE — 83010 ASSAY OF HAPTOGLOBIN QUANT: CPT

## 2024-05-02 PROCEDURE — 80076 HEPATIC FUNCTION PANEL: CPT

## 2024-05-02 PROCEDURE — 6360000002 HC RX W HCPCS: Performed by: HOSPITALIST

## 2024-05-02 PROCEDURE — 85025 COMPLETE CBC W/AUTO DIFF WBC: CPT

## 2024-05-02 PROCEDURE — 6370000000 HC RX 637 (ALT 250 FOR IP): Performed by: HOSPITALIST

## 2024-05-02 PROCEDURE — 36415 COLL VENOUS BLD VENIPUNCTURE: CPT

## 2024-05-02 RX ORDER — MIDODRINE HYDROCHLORIDE 10 MG/1
10 TABLET ORAL
Status: DISCONTINUED | OUTPATIENT
Start: 2024-05-02 | End: 2024-05-08 | Stop reason: HOSPADM

## 2024-05-02 RX ORDER — FUROSEMIDE 10 MG/ML
40 INJECTION INTRAMUSCULAR; INTRAVENOUS 3 TIMES DAILY
Status: DISCONTINUED | OUTPATIENT
Start: 2024-05-02 | End: 2024-05-03

## 2024-05-02 RX ORDER — LANOLIN ALCOHOL/MO/W.PET/CERES
6 CREAM (GRAM) TOPICAL NIGHTLY PRN
Status: DISCONTINUED | OUTPATIENT
Start: 2024-05-02 | End: 2024-05-08 | Stop reason: HOSPADM

## 2024-05-02 RX ADMIN — Medication 12.5 MG: at 08:25

## 2024-05-02 RX ADMIN — MIDODRINE HYDROCHLORIDE 10 MG: 10 TABLET ORAL at 17:41

## 2024-05-02 RX ADMIN — DOXAZOSIN 4 MG: 4 TABLET ORAL at 21:33

## 2024-05-02 RX ADMIN — CARVEDILOL 3.12 MG: 3.12 TABLET, FILM COATED ORAL at 08:25

## 2024-05-02 RX ADMIN — Medication 6 MG: at 01:37

## 2024-05-02 RX ADMIN — FUROSEMIDE 40 MG: 10 INJECTION, SOLUTION INTRAMUSCULAR; INTRAVENOUS at 08:25

## 2024-05-02 RX ADMIN — FUROSEMIDE 40 MG: 10 INJECTION, SOLUTION INTRAMUSCULAR; INTRAVENOUS at 21:33

## 2024-05-02 RX ADMIN — SODIUM CHLORIDE, PRESERVATIVE FREE 10 ML: 5 INJECTION INTRAVENOUS at 21:33

## 2024-05-02 RX ADMIN — CARVEDILOL 3.12 MG: 3.12 TABLET, FILM COATED ORAL at 17:41

## 2024-05-02 RX ADMIN — MIDODRINE HYDROCHLORIDE 10 MG: 10 TABLET ORAL at 14:02

## 2024-05-02 RX ADMIN — FUROSEMIDE 40 MG: 10 INJECTION, SOLUTION INTRAMUSCULAR; INTRAVENOUS at 14:02

## 2024-05-02 RX ADMIN — ALLOPURINOL 100 MG: 100 TABLET ORAL at 08:25

## 2024-05-02 RX ADMIN — SODIUM CHLORIDE, PRESERVATIVE FREE 10 ML: 5 INJECTION INTRAVENOUS at 08:25

## 2024-05-02 RX ADMIN — ISOSORBIDE MONONITRATE 30 MG: 30 TABLET, EXTENDED RELEASE ORAL at 08:25

## 2024-05-02 RX ADMIN — PRAVASTATIN SODIUM 20 MG: 20 TABLET ORAL at 21:33

## 2024-05-02 NOTE — PROGRESS NOTES
[facility]  Progress Note                   Date:   5/2/2024  Patient name: Bruno Bustamante  Date of admission:  4/29/2024  9:48 AM  MRN:   [id number]  YOB: 1930  PCP: Ko Bauer MD    Reason for Admission: Cellulitis [L03.90]    Subjective:       Clinical Changes /Abnormalities: Pt seen and examined in the room.  Patient sitting up in chair. Pt denies any CP but reports shortness of breath with exertion. Patient has continued swelling in his bilateral lower extremities.  Labs, vitals and tele reviewed- A-fib. Rate 90-100s.     Medications:   Scheduled Meds:   carvedilol  3.125 mg Oral BID WC    allopurinol  100 mg Oral Daily    [Held by provider] cilostazol  100 mg Oral Daily    doxazosin  4 mg Oral Nightly    isosorbide mononitrate  30 mg Oral Daily    pravastatin  20 mg Oral Nightly    spironolactone  12.5 mg Oral Daily    sodium chloride flush  10 mL IntraVENous 2 times per day    furosemide  40 mg IntraVENous Daily    warfarin placeholder: dosing by pharmacy   Other RX Placeholder    insulin lispro  0-8 Units SubCUTAneous TID     insulin lispro  0-4 Units SubCUTAneous Nightly     Continuous Infusions:   sodium chloride      dextrose       CBC:   Recent Labs     04/30/24  0558 05/01/24  0551 05/02/24  0537   WBC 5.8 5.1 5.8   HGB 8.5* 8.8* 9.5*   PLT 61* See Reflexed IPF Result See Reflexed IPF Result       BMP:    Recent Labs     04/30/24  0558 05/01/24  0551 05/02/24  0537   * 131* 131*   K 3.7 3.6* 4.5   CL 98 98 95*   CO2 22 22 22   BUN 43* 44* 46*   CREATININE 1.8* 1.9* 2.0*   GLUCOSE 160* 155* 151*       Hepatic:No results for input(s): \"AST\", \"ALT\", \"BILITOT\", \"ALKPHOS\" in the last 72 hours.    Invalid input(s): \"ALB\"  Troponin: No results for input(s): \"TROPHS\" in the last 72 hours.  BNP: No results for input(s): \"BNP\" in the last 72 hours.  Lipids: No results for input(s): \"CHOL\", \"HDL\" in the last 72 hours.    Invalid input(s): \"LDLCALCU\"  INR:   Recent  Danii Mccallum, APRN - CNP on 5/2/2024 at 8:48 AM  [facility]  [phone]

## 2024-05-02 NOTE — PROGRESS NOTES
Physical Therapy  DATE: 2024    NAME: Bruno Bustamante  MRN: [id number]   : 1930    Patient not seen this date for Physical Therapy due to:      [] Cancel by RN or physician due to:    [] Hemodialysis    [] Critical Lab Value Level     [] Blood transfusion in progress    [] Acute or unstable cardiovascular status   _MAP < 55 or more than >115  _HR < 40 or > 130    [] Acute or unstable pulmonary status   -FiO2 > 60%   _RR < 5 or >40    _O2 sats < 85%    [] Strict Bedrest    [] Off Unit for surgery or procedure    [] Off Unit for testing       [] Pending imaging to R/O fracture    [x] Refusal by Patient Pt in bed states he was up all night vomiting. Pt nauseated and wants to rest at this time. PT will  cont to follow.                               [] Other      [] PT being discontinued at this time. Patient independent. No further needs.     [] PT being discontinued at this time as the patient has been transferred to hospice care. No further needs.      ALIA MCNEILL, PTA

## 2024-05-02 NOTE — PROGRESS NOTES
Occupational Therapy  [facility]  Occupational Therapy Not Seen    DATE: 2024    NAME: Bruno Bustamante  MRN: [id number]   : 1930    Patient not seen this date for Occupational Therapy due to:      [] Cancel by RN or physician due to:    [] Hemodialysis    [] Critical Lab Value Level     [] Blood transfusion in progress    [] Acute or unstable cardiovascular status   _MAP < 55 or more than >115  _HR < 40 or > 130    [] Acute or unstable pulmonary status   -FiO2 > 60%   _RR < 5 or >40    _O2 sats < 85%    [] Strict Bedrest    [] Off Unit for surgery or procedure    [] Off Unit for testing       [] Pending imaging to R/O fracture    [] Refusal by Patient      [x] Other: Pt in bed and reports being up all night and vomiting. Pt nauseated and wants to rest at this time. Will cont to follow.      [] OT being discontinued at this time. Patient independent. No further needs.     [] OT being discontinued at this time as the patient has been transferred to hospice care. No further needs.      NANCY Morales

## 2024-05-02 NOTE — CONSULTS
Nephrology Consult Note    Reason for Consult: CKD 3B needing diuresis  Requesting Physician: Dr. Galindo    Chief Complaint: Shortness of breath and lower extremities edema  History Obtained From:  patient    History of Present Illness:              This is a 89 y.o. male who presents with worsening lower extremities edema and shortness of breath over the past 1 months.  The patient was on Lasix 40 mg p.o. twice daily at home with no improvement, he has been having significant lower extremities edema with stasis dermatitis and cellulitis.  The patient had a 2D echo showing acute systolic CHF with ejection fraction of 25%, moderate MR and moderate pulmonary hypertension, the patient was started on Lasix 40 mg IV daily, the patient blood pressure is low, he denies any urinary retention, no chest pain.  Past medical history significant for CKD stage IIIb with a baseline creatinine around 2 mg/dL, under the care of Dr. Clayton, his creatinine is a stable.  He is also known to have A-fib, diabetes mellitus type 2, hypertension, chronic anemia    Past Medical History:        Diagnosis Date    Achilles tendon pain 1980's    Repair, Had DVT post surgery    Anemia     Atrial fibrillation (HCC)     Cardiomyopathy (HCC)     Cardiomyopathy (HCC)     Cataract     CHF (congestive heart failure) (HCC)     Chronic kidney disease     Stage 3    COPD (chronic obstructive pulmonary disease) (HCC)     Dental disease     Diabetes mellitus (HCC)     Type 2     Diabetic neuropathy (HCC)     Bilateral Lower legs distal to knees    DVT (deep venous thrombosis) ([facility]) 1980's    With PE     Edema     Bilateral Legs    Entropion 06/2016    Right Lower Lid    Gout     Hearing loss     Hyperlipidemia     Hypertension     Ribs, multiple fractures     Per [facility] records       Past Surgical History:        Procedure Laterality Date    ACHILLES TENDON SURGERY Left 1980's    Had DVT post surgery    COLONOSCOPY  1990's    EYE SURGERY Right  data filed at 5/2/2024 0209  Gross per 24 hour   Intake 100 ml   Output --   Net 100 ml       Physical Exam:  Awake, alert, in no acute distress  Skin: warm and dry, no rash or erythema  Eyes: conjunctivae normal and sclera anicteric  ENT: no thrush no pharyngeal congestion  orodental hygiene good  Neck: {GENERAL NECK EXAM:19983:: carotids without bruits bilaterally JVD: None Lymphadenopathty:{Desc; negative/positive:88867 Thyromegaly: none   Pulmonary: clear to auscultation bilaterally- no wheezes, rales or rhonchi, normal air movement, no respiratory distress  Cardiovascular: IRR  Abdomen: soft nontender, bowel sounds present, no organomegaly,  no ascites  Extremities: 3 + edema-  bilateral , stasis dermatitis/ cellultitis    CBC:  Recent Labs     04/30/24  0558 05/01/24  0551 05/02/24  0537   WBC 5.8 5.1 5.8   RBC 2.90* 3.00* 3.23*   HGB 8.5* 8.8* 9.5*   HCT 26.7* 27.6* 29.7*   MCV 92.1 92.0 92.0   MCH 29.3 29.3 29.4   MCHC 31.8 31.9 32.0   RDW 16.9* 17.0* 17.2*   PLT 61* See Reflexed IPF Result See Reflexed IPF Result   MPV 11.4  --   --      Chemistry:  Recent Labs     04/30/24  0558 05/01/24  0551 05/02/24  0537   * 131* 131*   K 3.7 3.6* 4.5   CL 98 98 95*   CO2 22 22 22   GLUCOSE 160* 155* 151*   BUN 43* 44* 46*   CREATININE 1.8* 1.9* 2.0*   ANIONGAP 12 11 14   LABGLOM 35* 32* 31*   CALCIUM 8.3* 8.4* 8.8       IRON:    Lab Results   Component Value Date/Time    IRON 52 02/16/2024 10:29 AM       TIBC:    Lab Results   Component Value Date/Time    TIBC 325 02/16/2024 10:29 AM     FERRITIN:    Lab Results   Component Value Date/Time    FERRITIN 79.5 02/16/2024 10:29 AM     PTH:   Lab Results   Component Value Date/Time    IPTH 252.4 02/16/2024 10:29 AM       Phosphorus:    Lab Results   Component Value Date/Time    PHOS 3.6 02/16/2024 10:29 AM     Magnesium:   Lab Results   Component Value Date/Time    MG 2.3 02/16/2024 10:29 AM     Albumin:   Lab Results   Component Value Date/Time    LABALBU NEG

## 2024-05-02 NOTE — CARE COORDINATION
CM spoke with patient to discuss transitional planning. Patient provided CM with [facility] choices of [facility] or [id number]. Referrals sent to both.    1300- Message received from Nena with [id number] confirming they are able to accept patient.

## 2024-05-02 NOTE — PROGRESS NOTES
Progress note  [facility]    Adult Hospitalist      Name: Bruno Bustamante  MRN: [id number]     Acct: [id number]  Room: 2025/2025-01    Admit Date: 4/29/2024  9:48 AM  PCP: Ko Bauer MD    Primary Problem  Principal Problem:    Cellulitis  Active Problems:    Bilateral lower leg cellulitis    Pressure ulcer of coccygeal region, stage 3 (HCC)  Resolved Problems:    * No resolved hospital problems. *        Assesment/ plan:     Patient admitted to [facility] floor        Bilateral lower extremity stasis dermatitis  Lactate and procalcitonin  Blood culture  IV cefepime discontinued by ID/off antibiotics  ID consult      Stasis dermatitis  Patient has chronic stasis dermatitis  Venous Doppler lower extremity negative for DVT    Acute on chronic systolic diastolic CHF  Patient presented with bilateral lower extremity edema-worsening  IV diuretics intensified  Monitor creatinine  Echocardiogram shows EF 25 to 30%  Cardiology on consult  Nephrology is consulted      Ischemic cardiomyopathy  Stable      Chronic atrial fibrillation  Patient is on warfarin  Monitor heart rate      History of DVT/PE  Stable      Hypertensive heart disease  Monitor blood pressure  Continue antihypertensives as below      Mixed hyperlipidemia  On statin      Diabetes type 2  Monitor blood glucose  SSI      Peripheral arterial disease  Stable        Continue to monitor/telemetry/CBC with differential daily/BMP daily  DVT and GI prophylaxis.  Continue medications as below      Discussed with son  present at the bedside    Scheduled Meds:   furosemide  40 mg IntraVENous TID    midodrine  10 mg Oral TID WC    carvedilol  3.125 mg Oral BID WC    allopurinol  100 mg Oral Daily    [Held by provider] cilostazol  100 mg Oral Daily    doxazosin  4 mg Oral Nightly    isosorbide mononitrate  30 mg Oral Daily    pravastatin  20 mg Oral Nightly    spironolactone  12.5 mg Oral Daily    sodium chloride flush  10 mL IntraVENous 2 times per day     ProviderNella MD   darbepoetin calos-polysorbate (ARANESP) 40 MCG/0.4ML SOSY injection Inject 0.4 mLs as directed every 30 days    Nella Marte MD   warfarin (COUMADIN) 2 MG tablet Take 1 tablet by mouth daily    Nella Marte MD   pravastatin (PRAVACHOL) 40 MG tablet Take 0.5 tablets by mouth at bedtime    Nella Marte MD        Allergies:       Amoxicillin, Bumetanide, Cephalexin, Metoprolol, Omeprazole, Metformin, and Simvastatin    Social History:     Tobacco:    reports that he has never smoked. He has never used smokeless tobacco.  Alcohol:      reports current alcohol use of about 7.0 standard drinks of alcohol per week.  Drug Use:  reports no history of drug use.    Family History:     History reviewed. No pertinent family history.      Physical Exam:     Vitals:  BP (!) 101/59   Pulse 98   Temp 97.3 °F (36.3 °C) (Oral)   Resp 16   Ht 1.778 m (5' 10\")   Wt 85.9 kg (189 lb 6.4 oz)   SpO2 96%   BMI 27.18 kg/m²   Temp (24hrs), Av.3 °F (36.3 °C), Min:97.2 °F (36.2 °C), Max:97.3 °F (36.3 °C)          General appearance - alert, well appearing, and in no acute distress  Mental status - oriented to person, place, and time with normal affect  Head - normocephalic and atraumatic  Eyes - pupils equal and reactive, extraocular eye movements intact, conjunctiva clear  Ears - hearing appears to be intact  Nose - no drainage noted  Mouth - mucous membranes moist  Neck - supple, no carotid bruits, thyroid not palpable  Chest - clear to auscultation, normal effort  Heart - normal rate, regular rhythm, no murmur  Abdomen - soft, nontender, nondistended, bowel sounds present all four quadrants, no masses, hepatomegaly or splenomegaly  Neurological - normal speech, no focal findings or movement disorder noted, cranial nerves II through XII grossly intact  Extremities -bilateral lower extremity stasis dermatitis with erythema extending to the thigh which is new  Skin - no gross

## 2024-05-02 NOTE — PROGRESS NOTES
Warfarin Dosing - Pharmacy Consult Note  Consulting Provider: Dr. Galindo  Indication:  History of  VTE/PE and Atrial Fibrillation  Warfarin Dose prior to admission: 2mg Mon/Fri, 4mg all other days   Concurrent anticoagulants/antiplatelets: None  Significant Drug Interactions:  No hospital meds  Recent Labs     04/30/24  0558 05/01/24  0551 05/02/24  0537   INR 3.3 3.0 2.5   HGB 8.5* 8.8* 9.5*   PLT 61* 44 39     Recent warfarin administrations                     warfarin (COUMADIN) tablet 2 mg (mg) 2 mg Given 05/01/24 1806                   Date      INR       Dose  4/29       3.1       none  4/30       3.3       none  5/1         3.0       2mg  5/2         2.5    Assessment/Plan  (Goal INR: 2 - 3)  Platelets have dropped to 39 today. Message was sent to provider and HemOnc was consulted for thrombocytopenia.     INR is therapeutic today at 2.5. Will hold dose tonight until evaluated further.     Active problem list reviewed.  INR orders are placed.  Chart reviewed for pertinent labs, drug/diet interactions, and past doses.  Documentation of patient's clinical condition was reviewed.    Pharmacy Dosing:  Pharmacy will continue to follow.

## 2024-05-02 NOTE — CONSULTS
[facility] - Consult Note  5/2/2024, 4:34 PM    Admit Date: 4/29/2024  Referring Physician: Vicky Galindo MD   PCP: Ko Bauer MD    Reason for Consult: Thrombocytopenia    History of Present Illness:   Bruno is an established patient of our practice who follows with Dr. He for his history of anemia and thrombocytopenia. His anemia has been attributed to renal insufficiency and iron deficiency, for which he as been managed with DANIELITO therapy and IV iron replacement in the outpatient setting. Despite these interventions, his hgb has remained low with baseline below 10. Given the same, his DANIELITO treatment frequency was recently increased from once monthly to every 2 weeks. He has been holding off on bone marrow biopsy. His thrombocytopenia has been low but stable since at least 2016, with baseline between 80-100k. He is at his baseline hgb level. Platelets as of 4/30 were 61k which is lower than baseline, likely due to infection +/- effects of recent antibiotics. I have added a repeat platelet level to trend. Serologic workup including nutritional studies, paraprotein studies, hemolysis markers pending. Coags were unremarkable given that he is on Coumadin therapy, fibrinogen WNL. Given his age, we would advise holding off on bone marrow unless absolutely necessary.      Patient presents to the hospital for management of CHF exacerbation and suspected cellulitis. Cardiology, nephrology, and infectious     Patient seen and examined at bedside. He is up to chair sleeping, but awakens to voice. He complains of LE edema and SOBOE. He denies any other symptoms at this time. VSS aside from intermittent tachycardia. Maintained on room air without distress at rest, afebrile.       Impression/Plan:   Anemia, thrombocytopenia  -Chronic. Anemia is at baseline. Thrombocytopenia is slightly less than baseline, likely d/t acute infection +/- effects of antibiotics. Cannot exclude underlying  and Nausea And Vomiting    Bumetanide Nausea And Vomiting    Cephalexin Nausea And Vomiting     Pt tolerated ceftriaxone during 11/22/2022 admission.     Metoprolol Shortness Of Breath     Other reaction(s): Bradycardia (finding)      Omeprazole Nausea And Vomiting    Metformin Diarrhea and Nausea Only     Other reaction(s): GI Disturbance    Simvastatin Myalgia     Other reaction(s): Muscle pain (finding)          Home Meds:  Medications Prior to Admission: spironolactone (ALDACTONE) 25 MG tablet, Take 0.5 tablets by mouth daily (Patient not taking: Reported on 4/29/2024)  cilostazol (PLETAL) 100 MG tablet, Take 1 tablet by mouth daily  allopurinol (ZYLOPRIM) 100 MG tablet, Take 1 tablet by mouth daily  doxazosin (CARDURA) 4 MG tablet, Take 1 tablet by mouth at bedtime  [DISCONTINUED] carvedilol (COREG) 6.25 MG tablet, Take 1 tablet by mouth with breakfast and with evening meal  gabapentin (NEURONTIN) 300 MG capsule, Take 1 capsule by mouth every evening.  furosemide (LASIX) 40 MG tablet, Take 1 tablet by mouth 2 times daily  vitamin D (ERGOCALCIFEROL) 1.25 MG (57484 UT) CAPS capsule, take 1 capsule by mouth EVERY MONTH  [DISCONTINUED] azithromycin (ZITHROMAX) 250 MG tablet, take 2 tablets by mouth on day 1 IN ONE DOSE then 1 tablet on days 2 through 5 (Patient not taking: Reported on 10/5/2023)  [DISCONTINUED] alogliptin (NESINA) 6.25 MG TABS tablet, Take 1 tablet by mouth daily (Patient not taking: Reported on 6/26/2023)  [DISCONTINUED] mirtazapine (REMERON) 7.5 MG tablet, Take 1 tablet by mouth nightly (Patient not taking: Reported on 6/26/2023)  isosorbide mononitrate (IMDUR) 60 MG extended release tablet, Take 0.5 tablets by mouth daily  darbepoetin calos-polysorbate (ARANESP) 40 MCG/0.4ML SOSY injection, Inject 0.4 mLs as directed every 30 days  warfarin (COUMADIN) 2 MG tablet, Take 1 tablet by mouth daily  [DISCONTINUED] Cholecalciferol (VITAMIN D3) 1.25 MG (70692 UT) TABS, Take 1 tablet by mouth every 30

## 2024-05-02 NOTE — PROGRESS NOTES
Comprehensive Nutrition Assessment    Type and Reason for Visit:  Wound    Nutrition Recommendations/Plan:   ADULT DIET; Regular; Low Sodium (2 gm); 1500 ml   Start Glucerna 1x/day  Monitor p.o intakes, wound healing status and labs     Malnutrition Assessment:  Malnutrition Status:  At risk for malnutrition (Comment) (05/02/24 3880)        Nutrition Assessment:    Patient admission is related to cellulitis. Patient also has a stage III coccyx wound that occured when patient slipped and fell on the toilet one month ago. Patient reports a poor appetite since admission  and has not been eating much. Patient is on a Regular; Low Sodium; 1500 mL fluid restriction. Patient agreed to try Glucerna supplements due to poor oral intakes and wound. Monitor p.o intakes and labs.    Nutrition Related Findings:    Edema: +3 pitting BLE. Active bowel sounds. Poor oral intakes Wound Type: Stage III (coccyx)       Current Nutrition Intake & Therapies:    Average Meal Intake: 1-25%  Average Supplements Intake: None Ordered  Diet NPO Exceptions are: Sips of Water with Meds  ADULT DIET; Regular; Low Sodium (2 gm); 1500 ml    Anthropometric Measures:  Height: 177.8 cm (5' 10\")  Ideal Body Weight (IBW): 166 lbs (75 kg)       Current Body Weight: 85.7 kg (189 lb), 113.9 % IBW. Weight Source: Bed Scale  Current BMI (kg/m2): 27.1                          BMI Categories: Overweight (BMI 25.0-29.9)    Estimated Daily Nutrient Needs:  Energy Requirements Based On: Kcal/kg  Weight Used for Energy Requirements: Current  Energy (kcal/day): 7397-2283 kcal (20-23 kcal/kg)  Weight Used for Protein Requirements: Ideal  Protein (g/day):  gm of protein (1.3-1.5 gm/kg)  Method Used for Fluid Requirements: 1 ml/kcal  Fluid (ml/day): 1719-2167 mL    Nutrition Diagnosis:   Increased nutrient needs related to increase demand for energy/nutrients as evidenced by wounds    Nutrition Interventions:   Food and/or Nutrient Delivery: Continue Current Diet,  Start Oral Nutrition Supplement  Nutrition Education/Counseling: Education not indicated  Coordination of Nutrition Care: Continue to monitor while inpatient       Goals:     Goals: PO intake 75% or greater       Nutrition Monitoring and Evaluation:      Food/Nutrient Intake Outcomes: Food and Nutrient Intake, Supplement Intake  Physical Signs/Symptoms Outcomes: Biochemical Data, Fluid Status or Edema, Skin, Weight, GI Status    Discharge Planning:    Continue current diet, Continue Oral Nutrition Supplement           Danii MOOREN, RDN, LDN  Lead Clinical Dietitian  RD Office Phone [phone]

## 2024-05-03 ENCOUNTER — APPOINTMENT (OUTPATIENT)
Dept: ULTRASOUND IMAGING | Age: 89
DRG: 602 | End: 2024-05-03
Payer: COMMERCIAL

## 2024-05-03 LAB
ANION GAP SERPL CALCULATED.3IONS-SCNC: 13 MMOL/L (ref 9–17)
ANION GAP SERPL CALCULATED.3IONS-SCNC: 16 MMOL/L (ref 9–17)
BASOPHILS # BLD: 0.05 K/UL (ref 0–0.2)
BASOPHILS NFR BLD: 1 %
BILIRUB UR QL STRIP: NEGATIVE
BNP SERPL-MCNC: ABNORMAL PG/ML
BUN SERPL-MCNC: 49 MG/DL (ref 8–23)
BUN SERPL-MCNC: 51 MG/DL (ref 8–23)
BUN/CREAT SERPL: 24 (ref 9–20)
BUN/CREAT SERPL: 26 (ref 9–20)
CALCIUM SERPL-MCNC: 8.5 MG/DL (ref 8.6–10.4)
CALCIUM SERPL-MCNC: 8.6 MG/DL (ref 8.6–10.4)
CHLORIDE SERPL-SCNC: 96 MMOL/L (ref 98–107)
CHLORIDE SERPL-SCNC: 98 MMOL/L (ref 98–107)
CLARITY UR: CLEAR
CO2 SERPL-SCNC: 20 MMOL/L (ref 20–31)
CO2 SERPL-SCNC: 22 MMOL/L (ref 20–31)
COLOR UR: YELLOW
CREAT SERPL-MCNC: 1.9 MG/DL (ref 0.7–1.2)
CREAT SERPL-MCNC: 2.1 MG/DL (ref 0.7–1.2)
EOSINOPHIL # BLD: 0.16 K/UL (ref 0–0.4)
EOSINOPHILS RELATIVE PERCENT: 3 % (ref 1–4)
ERYTHROCYTE [DISTWIDTH] IN BLOOD BY AUTOMATED COUNT: 17.2 % (ref 11.8–14.4)
GFR, ESTIMATED: 29 ML/MIN/1.73M2
GFR, ESTIMATED: 32 ML/MIN/1.73M2
GLUCOSE BLD-MCNC: 135 MG/DL (ref 75–110)
GLUCOSE BLD-MCNC: 161 MG/DL (ref 75–110)
GLUCOSE BLD-MCNC: 173 MG/DL (ref 75–110)
GLUCOSE BLD-MCNC: 199 MG/DL (ref 75–110)
GLUCOSE SERPL-MCNC: 145 MG/DL (ref 70–99)
GLUCOSE SERPL-MCNC: 173 MG/DL (ref 70–99)
GLUCOSE UR STRIP-MCNC: NEGATIVE MG/DL
HCT VFR BLD AUTO: 29.8 % (ref 40.7–50.3)
HGB BLD-MCNC: 9.4 G/DL (ref 13–17)
HGB UR QL STRIP.AUTO: NEGATIVE
IMM GRANULOCYTES # BLD AUTO: 0.05 K/UL (ref 0–0.3)
IMM GRANULOCYTES NFR BLD: 1 %
INR PPP: 2.8
INTERPRETATION SERPL IFE-IMP: NORMAL
KETONES UR STRIP-MCNC: NEGATIVE MG/DL
LEUKOCYTE ESTERASE UR QL STRIP: NEGATIVE
LYMPHOCYTES NFR BLD: 0.62 K/UL (ref 1–4.8)
LYMPHOCYTES RELATIVE PERCENT: 12 % (ref 24–44)
MCH RBC QN AUTO: 29.2 PG (ref 25.2–33.5)
MCHC RBC AUTO-ENTMCNC: 31.5 G/DL (ref 28.4–34.8)
MCV RBC AUTO: 92.5 FL (ref 82.6–102.9)
MONOCYTES NFR BLD: 0.52 K/UL (ref 0.2–0.8)
MONOCYTES NFR BLD: 10 % (ref 1–7)
NEUTROPHILS NFR BLD: 73 % (ref 36–66)
NEUTS SEG NFR BLD: 3.8 K/UL (ref 1.8–7.7)
NITRITE UR QL STRIP: NEGATIVE
NRBC BLD-RTO: 0 PER 100 WBC
PATH REV: NORMAL
PH UR STRIP: 5 [PH] (ref 5–8)
PLATELET # BLD AUTO: ABNORMAL K/UL (ref 138–453)
PLATELET, FLUORESCENCE: 32 K/UL (ref 138–453)
PLATELETS.RETICULATED NFR BLD AUTO: 5.8 % (ref 1.1–10.3)
POTASSIUM SERPL-SCNC: 4 MMOL/L (ref 3.7–5.3)
POTASSIUM SERPL-SCNC: 4.3 MMOL/L (ref 3.7–5.3)
PROT UR STRIP-MCNC: NEGATIVE MG/DL
PROTHROMBIN TIME: 29.1 SEC (ref 11.5–14.2)
RBC # BLD AUTO: 3.22 M/UL (ref 4.21–5.77)
SODIUM SERPL-SCNC: 132 MMOL/L (ref 135–144)
SODIUM SERPL-SCNC: 133 MMOL/L (ref 135–144)
SP GR UR STRIP: 1.01 (ref 1–1.03)
UROBILINOGEN UR STRIP-ACNC: NORMAL EU/DL (ref 0–1)
WBC OTHER # BLD: 5.2 K/UL (ref 3.5–11.3)

## 2024-05-03 PROCEDURE — 6360000002 HC RX W HCPCS: Performed by: INTERNAL MEDICINE

## 2024-05-03 PROCEDURE — 85055 RETICULATED PLATELET ASSAY: CPT

## 2024-05-03 PROCEDURE — 6370000000 HC RX 637 (ALT 250 FOR IP): Performed by: INTERNAL MEDICINE

## 2024-05-03 PROCEDURE — 6370000000 HC RX 637 (ALT 250 FOR IP): Performed by: HOSPITALIST

## 2024-05-03 PROCEDURE — 85610 PROTHROMBIN TIME: CPT

## 2024-05-03 PROCEDURE — 36415 COLL VENOUS BLD VENIPUNCTURE: CPT

## 2024-05-03 PROCEDURE — 81003 URINALYSIS AUTO W/O SCOPE: CPT

## 2024-05-03 PROCEDURE — 1200000000 HC SEMI PRIVATE

## 2024-05-03 PROCEDURE — 82947 ASSAY GLUCOSE BLOOD QUANT: CPT

## 2024-05-03 PROCEDURE — 80048 BASIC METABOLIC PNL TOTAL CA: CPT

## 2024-05-03 PROCEDURE — 99233 SBSQ HOSP IP/OBS HIGH 50: CPT

## 2024-05-03 PROCEDURE — 85025 COMPLETE CBC W/AUTO DIFF WBC: CPT

## 2024-05-03 PROCEDURE — 2580000003 HC RX 258: Performed by: HOSPITALIST

## 2024-05-03 PROCEDURE — 76705 ECHO EXAM OF ABDOMEN: CPT

## 2024-05-03 PROCEDURE — 83880 ASSAY OF NATRIURETIC PEPTIDE: CPT

## 2024-05-03 RX ORDER — METOLAZONE 2.5 MG/1
5 TABLET ORAL DAILY
Status: DISCONTINUED | OUTPATIENT
Start: 2024-05-03 | End: 2024-05-08 | Stop reason: HOSPADM

## 2024-05-03 RX ORDER — BUMETANIDE 0.25 MG/ML
2 INJECTION INTRAMUSCULAR; INTRAVENOUS 2 TIMES DAILY
Status: DISCONTINUED | OUTPATIENT
Start: 2024-05-03 | End: 2024-05-08

## 2024-05-03 RX ORDER — ALBUMIN, HUMAN INJ 5% 5 %
25 SOLUTION INTRAVENOUS EVERY 6 HOURS PRN
Status: DISCONTINUED | OUTPATIENT
Start: 2024-05-03 | End: 2024-05-08 | Stop reason: HOSPADM

## 2024-05-03 RX ADMIN — DOXAZOSIN 4 MG: 4 TABLET ORAL at 23:13

## 2024-05-03 RX ADMIN — MIDODRINE HYDROCHLORIDE 10 MG: 10 TABLET ORAL at 17:32

## 2024-05-03 RX ADMIN — SODIUM CHLORIDE, PRESERVATIVE FREE 10 ML: 5 INJECTION INTRAVENOUS at 09:17

## 2024-05-03 RX ADMIN — PRAVASTATIN SODIUM 20 MG: 20 TABLET ORAL at 23:13

## 2024-05-03 RX ADMIN — MIDODRINE HYDROCHLORIDE 10 MG: 10 TABLET ORAL at 09:17

## 2024-05-03 RX ADMIN — FUROSEMIDE 40 MG: 10 INJECTION, SOLUTION INTRAMUSCULAR; INTRAVENOUS at 14:14

## 2024-05-03 RX ADMIN — BUMETANIDE 2 MG: 0.25 INJECTION INTRAMUSCULAR; INTRAVENOUS at 17:32

## 2024-05-03 RX ADMIN — METOLAZONE 5 MG: 2.5 TABLET ORAL at 17:32

## 2024-05-03 RX ADMIN — FUROSEMIDE 40 MG: 10 INJECTION, SOLUTION INTRAMUSCULAR; INTRAVENOUS at 10:18

## 2024-05-03 RX ADMIN — SODIUM CHLORIDE, PRESERVATIVE FREE 10 ML: 5 INJECTION INTRAVENOUS at 23:14

## 2024-05-03 RX ADMIN — MIDODRINE HYDROCHLORIDE 10 MG: 10 TABLET ORAL at 12:27

## 2024-05-03 RX ADMIN — ALLOPURINOL 100 MG: 100 TABLET ORAL at 09:17

## 2024-05-03 NOTE — PROGRESS NOTES
Occupational Therapy  [facility]  Occupational Therapy Not Seen    DATE: 5/3/2024    NAME: Bruno Bustamante  MRN: [id number]   : 1930    Patient not seen this date for Occupational Therapy due to:      [] Cancel by RN or physician due to:    [] Hemodialysis    [] Critical Lab Value Level     [] Blood transfusion in progress    [] Acute or unstable cardiovascular status   _MAP < 55 or more than >115  _HR < 40 or > 130    [] Acute or unstable pulmonary status   -FiO2 > 60%   _RR < 5 or >40    _O2 sats < 85%    [] Strict Bedrest    [] Off Unit for surgery or procedure    [] Off Unit for testing       [] Pending imaging to R/O fracture    [x] Refusal by Patient: Pt in bed upon arrival and stated he just got back into bed and wants to rest at this time. Pt stated he feels much better than yesterday and will participate later. Checked back at noon, pt just getting lunch.      [] Other      [] OT being discontinued at this time. Patient independent. No further needs.     [] OT being discontinued at this time as the patient has been transferred to hospice care. No further needs.      NANCY Morlaes

## 2024-05-03 NOTE — PROGRESS NOTES
[facility] Hematology and Oncology - Daily Progress Note  5/3/2024, 10:32 AM    Admit Date: 4/29/2024  PCP: Ko Bauer MD    Impression:   #1 chronic thrombocytopenia/anemia with slight worsening may be related to infection, cannot rule out bone marrow related causes, antibiotics or autoimmune process be adding to it.  Some suggestion of iron deficiency for anemia and anemia can also be related to renal sufficiency/ kidney disease.  No evidence of DIC.  He does have mild splenomegaly which may be adding to thrombocytopenia  #2 cellulitis  #3  Elderly with multiple health issues-cardiomyopathy, A-fib, COPD, diabetes, chronic renal disease, history of DVT and PE, dyslipidemia, hypertension    Plan:   1.  Continue renal dose erythropoietin therapy  2.  Will order dose of IV iron not given  3.  May transfuse platelet if less than 10K and transfuse PRBC if hemoglobin 7 or less unless other medical reasons  4.  Patient refused bone marrow evaluation and had been worked up by Dr. He as outpatient suggest follow-up after discharge  5.  Follow CBC trend    Subjective/Interval History:   No complaints of bleeding.  Patient is walking  Past Medical History:   Diagnosis Date    Achilles tendon pain 1980's    Repair, Had DVT post surgery    Anemia     Atrial fibrillation (HCC)     Cardiomyopathy (HCC)     Cardiomyopathy (HCC)     Cataract     CHF (congestive heart failure) (HCC)     Chronic kidney disease     Stage 3    COPD (chronic obstructive pulmonary disease) (HCC)     Dental disease     Diabetes mellitus (HCC)     Type 2     Diabetic neuropathy (HCC)     Bilateral Lower legs distal to knees    DVT (deep venous thrombosis) (HCC) 1980's    With PE     Edema     Bilateral Legs    Entropion 06/2016    Right Lower Lid    Gout     Hearing loss     Hyperlipidemia     Hypertension     Ribs, multiple fractures     Per [facility] records      Physical Examination :   BP 95/62   Pulse 90   Temp 97.3 °F (36.3  Hematology and Oncology

## 2024-05-03 NOTE — CARE COORDINATION
Call from Heath Bustamante patients son inquiring about home care through [facility]/outside agency.    Informed Heath ECHAVARRIA spoke to pt yesterday and he was agreeable to HHC with [address].    Son is also agreeable with plan and will call Nena at [address] to discuss services.

## 2024-05-03 NOTE — PROGRESS NOTES
Warfarin Dosing - Pharmacy Consult Note  Consulting Provider: Vicky Galindo MD  Indication: History of  VTE/PE and Atrial Fibrillation  Warfarin Dose prior to admission: 2mg Mon/Fri, 4mg all other days    Concurrent anticoagulants/antiplatelets: cilostazol (currently on hold)  Significant Drug Interactions:  allopurinol (home med)  Recent Labs     05/01/24  0551 05/02/24  0537 05/02/24  1749 05/03/24  0541   INR 3.0 2.5  --  2.8   HGB 8.8* 9.5*  --  9.4*   PLT See Reflexed IPF Result See Reflexed IPF Result 36* See Reflexed IPF Result     Recent warfarin administrations                     warfarin (COUMADIN) tablet 2 mg (mg) 2 mg Given 05/01/24 1806                   Date   INR    Dose  4/29       3.1       none  4/30       3.3       none  5/1         3.0       2mg  5/2         2.5       none  5/3         2.8    Assessment/Plan  (Goal INR: 2 - 3)  Platelets dropped down to 32 today. Oncology is following for thrombocytopenia/anemia.   INR of 2.8 today is therapeutic.   Hold warfarin again today since INR on the higher end of therapeutic range so platelet level can be evaluated further.   INR in the morning.    Active problem list reviewed.  INR orders are placed.  Chart reviewed for pertinent labs, drug/diet interactions, and past doses.  Documentation of patient's clinical condition was reviewed.    Pharmacy Dosing:  Pharmacy will continue to follow.

## 2024-05-03 NOTE — PROGRESS NOTES
Reason for Follow up: Acute on chronic kidney disease/cardiorenal syndrome/decompensated heart failure    Subjective:    Bruno seen and evaluated.  The patient is currently seated in the recliner chair with family in attendance.  He remains very weak but was able to work with physical therapy.  Complaining of exertional dyspnea.  Has his lower extremities wrapped/currently not significantly painful.  He is diuretic response is very poor and he had a bedside PVR of around 90 cc.  Blood pressures remain marginally low/has had several diuretic doses interrupted due to concerns of hypotension    Review Of Systems:   As noted presented trapped in his recliner chair in the assisted living.  He is currently using a donut cushion/does not have significant pain around the presacral ulcer.  Short of breath with exertion is noted.  Has weaned himself down to room air.  No active chest pain.  Abdominal distention without significant pain.  Chronic leg swelling.         Scheduled Meds:   furosemide  40 mg IntraVENous TID    midodrine  10 mg Oral TID WC    carvedilol  3.125 mg Oral BID WC    allopurinol  100 mg Oral Daily    [Held by provider] cilostazol  100 mg Oral Daily    doxazosin  4 mg Oral Nightly    isosorbide mononitrate  30 mg Oral Daily    pravastatin  20 mg Oral Nightly    spironolactone  12.5 mg Oral Daily    sodium chloride flush  10 mL IntraVENous 2 times per day    warfarin placeholder: dosing by pharmacy   Other RX Placeholder    insulin lispro  0-8 Units SubCUTAneous TID     insulin lispro  0-4 Units SubCUTAneous Nightly     Continuous Infusions:   sodium chloride      dextrose       PRN Meds:melatonin, sodium chloride flush, sodium chloride, ondansetron **OR** ondansetron, senna, acetaminophen **OR** acetaminophen, HYDROcodone 5 mg - acetaminophen, glucose, dextrose bolus **OR** dextrose bolus, glucagon (rDNA), dextrose    Allergies   Allergen Reactions    Amoxicillin Diarrhea and Nausea And Vomiting

## 2024-05-03 NOTE — PROGRESS NOTES
[facility]  Progress Note                   Date:   5/3/2024  Patient name: Bruno Bustamante  Date of admission:  4/29/2024  9:48 AM  MRN:   [id number]  YOB: 1930  PCP: Ko Bauer MD    Reason for Admission: Cellulitis [L03.90]    Subjective:       Clinical Changes /Abnormalities: Patient seen and examined in room. Denies CP or SOB. No acute CV events overnight. Labs, vitals, and tele reviewed.   Remain atrial fibrillation - Rate controlled today.. HR improved. - 90s.    Medications:   Scheduled Meds:   furosemide  40 mg IntraVENous TID    midodrine  10 mg Oral TID WC    carvedilol  3.125 mg Oral BID WC    allopurinol  100 mg Oral Daily    [Held by provider] cilostazol  100 mg Oral Daily    doxazosin  4 mg Oral Nightly    isosorbide mononitrate  30 mg Oral Daily    pravastatin  20 mg Oral Nightly    spironolactone  12.5 mg Oral Daily    sodium chloride flush  10 mL IntraVENous 2 times per day    warfarin placeholder: dosing by pharmacy   Other RX Placeholder    insulin lispro  0-8 Units SubCUTAneous TID WC    insulin lispro  0-4 Units SubCUTAneous Nightly     Continuous Infusions:   sodium chloride      dextrose       CBC:   Recent Labs     05/01/24  0551 05/02/24  0537 05/02/24  1749 05/03/24  0541   WBC 5.1 5.8  --  5.2   HGB 8.8* 9.5*  --  9.4*   PLT See Reflexed IPF Result See Reflexed IPF Result 36* See Reflexed IPF Result       BMP:    Recent Labs     05/01/24  0551 05/02/24  0537 05/03/24  0541   * 131* 133*   K 3.6* 4.5 4.3   CL 98 95* 98   CO2 22 22 22   BUN 44* 46* 49*   CREATININE 1.9* 2.0* 1.9*   GLUCOSE 155* 151* 145*       Hepatic:  Recent Labs     05/02/24  1131   AST 19   ALT <5*   BILITOT 0.7   ALKPHOS 312*     Troponin: No results for input(s): \"TROPHS\" in the last 72 hours.  BNP: No results for input(s): \"BNP\" in the last 72 hours.  Lipids: No results for input(s): \"CHOL\", \"HDL\" in the last 72 hours.    Invalid input(s): \"LDLCALCU\"  INR:   Recent Labs     consulted for LE cellulitis/Dermatitis  Keep K >4 and Mg >2       Electronically signed by DANA Valerio CNP on 5/3/2024 at 8:36 AM  [facility]  [phone]

## 2024-05-03 NOTE — PROGRESS NOTES
Repair, Had DVT post surgery    Anemia     Atrial fibrillation (HCC)     Cardiomyopathy (HCC)     Cardiomyopathy (HCC)     Cataract     CHF (congestive heart failure) (HCC)     Chronic kidney disease     Stage 3    COPD (chronic obstructive pulmonary disease) (HCC)     Dental disease     Diabetes mellitus (HCC)     Type 2     Diabetic neuropathy (HCC)     Bilateral Lower legs distal to knees    DVT (deep venous thrombosis) ([facility]) 1980's    With PE     Edema     Bilateral Legs    Entropion 06/2016    Right Lower Lid    Gout     Hearing loss     Hyperlipidemia     Hypertension     Ribs, multiple fractures     Per [facility] records        Past Surgical History:     Past Surgical History:   Procedure Laterality Date    ACHILLES TENDON SURGERY Left 1980's    Had DVT post surgery    COLONOSCOPY  1990's    EYE SURGERY Right 07/12/2016    Repair Entropion eye, Rt lower lid. Surgeon Elias Redman at [facility]    HERNIA REPAIR  1980     2 Lt inguinal    LIPOMA RESECTION  1980's    TONSILLECTOMY  1940's        Medications Prior to Admission:       Prior to Admission medications    Medication Sig Start Date End Date Taking? Authorizing Provider   spironolactone (ALDACTONE) 25 MG tablet Take 0.5 tablets by mouth daily  Patient not taking: Reported on 4/29/2024 10/5/23   Gonzalez Thomas MD   cilostazol (PLETAL) 100 MG tablet Take 1 tablet by mouth daily 10/5/23   Gonzalez Thomas MD   allopurinol (ZYLOPRIM) 100 MG tablet Take 1 tablet by mouth daily 10/5/23   Gonzalez Thomas MD   doxazosin (CARDURA) 4 MG tablet Take 1 tablet by mouth at bedtime 10/5/23   Gonzalez Thomas MD   gabapentin (NEURONTIN) 300 MG capsule Take 1 capsule by mouth every evening. 1/9/23   Nella Marte MD   furosemide (LASIX) 40 MG tablet Take 1 tablet by mouth 2 times daily 7/7/22   Nella Marte MD   vitamin D (ERGOCALCIFEROL) 1.25 MG (17873 UT) CAPS capsule take 1 capsule by mouth EVERY MONTH 6/7/23   Rosanna  MD Nella   isosorbide mononitrate (IMDUR) 60 MG extended release tablet Take 0.5 tablets by mouth daily    Nella Marte MD   darbepoetin calos-polysorbate (ARANESP) 40 MCG/0.4ML SOSY injection Inject 0.4 mLs as directed every 30 days    Nella Marte MD   warfarin (COUMADIN) 2 MG tablet Take 1 tablet by mouth daily    Nella Marte MD   pravastatin (PRAVACHOL) 40 MG tablet Take 0.5 tablets by mouth at bedtime    Nella Marte MD        Allergies:       Amoxicillin, Bumetanide, Cephalexin, Metoprolol, Omeprazole, Metformin, and Simvastatin    Social History:     Tobacco:    reports that he has never smoked. He has never used smokeless tobacco.  Alcohol:      reports current alcohol use of about 7.0 standard drinks of alcohol per week.  Drug Use:  reports no history of drug use.    Family History:     History reviewed. No pertinent family history.      Physical Exam:     Vitals:  BP (!) 97/59   Pulse 90   Temp 97.3 °F (36.3 °C) (Oral)   Resp 20   Ht 1.778 m (5' 10\")   Wt 84.3 kg (185 lb 12.8 oz)   SpO2 99%   BMI 26.66 kg/m²   Temp (24hrs), Av.3 °F (36.3 °C), Min:97.3 °F (36.3 °C), Max:97.3 °F (36.3 °C)          General appearance - alert, well appearing, and in no acute distress  Mental status - oriented to person, place, and time with normal affect  Head - normocephalic and atraumatic  Eyes - pupils equal and reactive, extraocular eye movements intact, conjunctiva clear  Ears - hearing appears to be intact  Nose - no drainage noted  Mouth - mucous membranes moist  Neck - supple, no carotid bruits, thyroid not palpable  Chest - clear to auscultation, normal effort  Heart - normal rate, regular rhythm, no murmur  Abdomen - soft, nontender, nondistended, bowel sounds present all four quadrants, no masses, hepatomegaly or splenomegaly  Neurological - normal speech, no focal findings or movement disorder noted, cranial nerves II through XII grossly intact  Extremities

## 2024-05-03 NOTE — PROGRESS NOTES
Dr. Mcleod notified that the patient is NPO and ultrasound was completed. Orders received to resume previous diet.

## 2024-05-04 LAB
ANION GAP SERPL CALCULATED.3IONS-SCNC: 14 MMOL/L (ref 9–17)
ANION GAP SERPL CALCULATED.3IONS-SCNC: 14 MMOL/L (ref 9–17)
BASOPHILS # BLD: 0.06 K/UL (ref 0–0.2)
BASOPHILS NFR BLD: 1 %
BNP SERPL-MCNC: ABNORMAL PG/ML
BUN SERPL-MCNC: 51 MG/DL (ref 8–23)
BUN SERPL-MCNC: 55 MG/DL (ref 8–23)
BUN/CREAT SERPL: 26 (ref 9–20)
BUN/CREAT SERPL: 26 (ref 9–20)
CALCIUM SERPL-MCNC: 8.5 MG/DL (ref 8.6–10.4)
CALCIUM SERPL-MCNC: 8.6 MG/DL (ref 8.6–10.4)
CHLORIDE SERPL-SCNC: 94 MMOL/L (ref 98–107)
CHLORIDE SERPL-SCNC: 97 MMOL/L (ref 98–107)
CO2 SERPL-SCNC: 22 MMOL/L (ref 20–31)
CO2 SERPL-SCNC: 24 MMOL/L (ref 20–31)
CREAT SERPL-MCNC: 2 MG/DL (ref 0.7–1.2)
CREAT SERPL-MCNC: 2.1 MG/DL (ref 0.7–1.2)
EOSINOPHIL # BLD: 0.11 K/UL (ref 0–0.4)
EOSINOPHILS RELATIVE PERCENT: 2 % (ref 1–4)
ERYTHROCYTE [DISTWIDTH] IN BLOOD BY AUTOMATED COUNT: 17.2 % (ref 11.8–14.4)
GFR, ESTIMATED: 29 ML/MIN/1.73M2
GFR, ESTIMATED: 31 ML/MIN/1.73M2
GLUCOSE BLD-MCNC: 144 MG/DL (ref 75–110)
GLUCOSE BLD-MCNC: 152 MG/DL (ref 75–110)
GLUCOSE BLD-MCNC: 169 MG/DL (ref 75–110)
GLUCOSE BLD-MCNC: 203 MG/DL (ref 75–110)
GLUCOSE SERPL-MCNC: 141 MG/DL (ref 70–99)
GLUCOSE SERPL-MCNC: 157 MG/DL (ref 70–99)
HAPTOGLOB SERPL-MCNC: 195 MG/DL (ref 30–200)
HCT VFR BLD AUTO: 30.3 % (ref 40.7–50.3)
HGB BLD-MCNC: 9.3 G/DL (ref 13–17)
IMM GRANULOCYTES # BLD AUTO: 0.06 K/UL (ref 0–0.3)
IMM GRANULOCYTES NFR BLD: 1 %
INR PPP: 2.6
LYMPHOCYTES NFR BLD: 0.66 K/UL (ref 1–4.8)
LYMPHOCYTES RELATIVE PERCENT: 12 % (ref 24–44)
MCH RBC QN AUTO: 29.2 PG (ref 25.2–33.5)
MCHC RBC AUTO-ENTMCNC: 30.7 G/DL (ref 28.4–34.8)
MCV RBC AUTO: 95 FL (ref 82.6–102.9)
MICROORGANISM SPEC CULT: NORMAL
MICROORGANISM SPEC CULT: NORMAL
MONOCYTES NFR BLD: 0.55 K/UL (ref 0.2–0.8)
MONOCYTES NFR BLD: 10 % (ref 1–7)
NEUTROPHILS NFR BLD: 74 % (ref 36–66)
NEUTS SEG NFR BLD: 4.06 K/UL (ref 1.8–7.7)
NRBC BLD-RTO: 0 PER 100 WBC
PLATELET # BLD AUTO: ABNORMAL K/UL (ref 138–453)
PLATELET, FLUORESCENCE: 32 K/UL (ref 138–453)
PLATELETS.RETICULATED NFR BLD AUTO: 5 % (ref 1.1–10.3)
POTASSIUM SERPL-SCNC: 3.7 MMOL/L (ref 3.7–5.3)
POTASSIUM SERPL-SCNC: 3.9 MMOL/L (ref 3.7–5.3)
PROTHROMBIN TIME: 27.1 SEC (ref 11.5–14.2)
RBC # BLD AUTO: 3.19 M/UL (ref 4.21–5.77)
SERVICE CMNT-IMP: NORMAL
SERVICE CMNT-IMP: NORMAL
SODIUM SERPL-SCNC: 132 MMOL/L (ref 135–144)
SODIUM SERPL-SCNC: 133 MMOL/L (ref 135–144)
SPECIMEN DESCRIPTION: NORMAL
SPECIMEN DESCRIPTION: NORMAL
WBC OTHER # BLD: 5.5 K/UL (ref 3.5–11.3)

## 2024-05-04 PROCEDURE — 6370000000 HC RX 637 (ALT 250 FOR IP): Performed by: INTERNAL MEDICINE

## 2024-05-04 PROCEDURE — 85025 COMPLETE CBC W/AUTO DIFF WBC: CPT

## 2024-05-04 PROCEDURE — 82947 ASSAY GLUCOSE BLOOD QUANT: CPT

## 2024-05-04 PROCEDURE — 97116 GAIT TRAINING THERAPY: CPT

## 2024-05-04 PROCEDURE — 85610 PROTHROMBIN TIME: CPT

## 2024-05-04 PROCEDURE — 36415 COLL VENOUS BLD VENIPUNCTURE: CPT

## 2024-05-04 PROCEDURE — 97110 THERAPEUTIC EXERCISES: CPT

## 2024-05-04 PROCEDURE — 85055 RETICULATED PLATELET ASSAY: CPT

## 2024-05-04 PROCEDURE — 6370000000 HC RX 637 (ALT 250 FOR IP): Performed by: HOSPITALIST

## 2024-05-04 PROCEDURE — 83880 ASSAY OF NATRIURETIC PEPTIDE: CPT

## 2024-05-04 PROCEDURE — 6370000000 HC RX 637 (ALT 250 FOR IP): Performed by: NURSE PRACTITIONER

## 2024-05-04 PROCEDURE — 80048 BASIC METABOLIC PNL TOTAL CA: CPT

## 2024-05-04 PROCEDURE — 6360000002 HC RX W HCPCS: Performed by: INTERNAL MEDICINE

## 2024-05-04 PROCEDURE — 1200000000 HC SEMI PRIVATE

## 2024-05-04 PROCEDURE — 2580000003 HC RX 258: Performed by: HOSPITALIST

## 2024-05-04 RX ADMIN — SODIUM CHLORIDE, PRESERVATIVE FREE 10 ML: 5 INJECTION INTRAVENOUS at 09:28

## 2024-05-04 RX ADMIN — DOXAZOSIN 4 MG: 4 TABLET ORAL at 20:27

## 2024-05-04 RX ADMIN — MIDODRINE HYDROCHLORIDE 10 MG: 10 TABLET ORAL at 12:46

## 2024-05-04 RX ADMIN — PRAVASTATIN SODIUM 20 MG: 20 TABLET ORAL at 20:27

## 2024-05-04 RX ADMIN — MIDODRINE HYDROCHLORIDE 10 MG: 10 TABLET ORAL at 17:04

## 2024-05-04 RX ADMIN — METOLAZONE 5 MG: 2.5 TABLET ORAL at 09:28

## 2024-05-04 RX ADMIN — Medication 12.5 MG: at 17:04

## 2024-05-04 RX ADMIN — INSULIN LISPRO 2 UNITS: 100 INJECTION, SOLUTION INTRAVENOUS; SUBCUTANEOUS at 12:46

## 2024-05-04 RX ADMIN — ALLOPURINOL 100 MG: 100 TABLET ORAL at 09:27

## 2024-05-04 RX ADMIN — SODIUM CHLORIDE, PRESERVATIVE FREE 10 ML: 5 INJECTION INTRAVENOUS at 20:28

## 2024-05-04 RX ADMIN — BUMETANIDE 2 MG: 0.25 INJECTION INTRAMUSCULAR; INTRAVENOUS at 09:27

## 2024-05-04 RX ADMIN — MIDODRINE HYDROCHLORIDE 10 MG: 10 TABLET ORAL at 09:27

## 2024-05-04 RX ADMIN — BUMETANIDE 2 MG: 0.25 INJECTION INTRAMUSCULAR; INTRAVENOUS at 17:04

## 2024-05-04 RX ADMIN — Medication 6 MG: at 20:27

## 2024-05-04 NOTE — PROGRESS NOTES
[facility]   Progress Note                   Date:   5/4/2024  Patient name: Bruno Bustamante  Date of admission:  4/29/2024  9:48 AM  MRN:   [id number]  YOB: 1930  PCP: Ko Bauer MD    Reason for Admission:      Subjective:       Clinical Changes / Abnormalities: Patient denies any specific symptoms no palpitations remained in A-fib      Medications:   Scheduled Meds:   bumetanide  2 mg IntraVENous BID    metOLazone  5 mg Oral Daily    midodrine  10 mg Oral TID WC    carvedilol  3.125 mg Oral BID WC    allopurinol  100 mg Oral Daily    [Held by provider] cilostazol  100 mg Oral Daily    doxazosin  4 mg Oral Nightly    isosorbide mononitrate  30 mg Oral Daily    pravastatin  20 mg Oral Nightly    spironolactone  12.5 mg Oral Daily    sodium chloride flush  10 mL IntraVENous 2 times per day    warfarin placeholder: dosing by pharmacy   Other RX Placeholder    insulin lispro  0-8 Units SubCUTAneous TID WC    insulin lispro  0-4 Units SubCUTAneous Nightly     Continuous Infusions:   sodium chloride      dextrose       CBC:   Recent Labs     05/02/24  0537 05/02/24  1749 05/03/24  0541 05/04/24  0532   WBC 5.8  --  5.2 5.5   HGB 9.5*  --  9.4* 9.3*   PLT See Reflexed IPF Result 36* See Reflexed IPF Result See Reflexed IPF Result     BMP:    Recent Labs     05/03/24  0541 05/03/24  1831 05/04/24  0532   * 132* 133*   K 4.3 4.0 3.9   CL 98 96* 97*   CO2 22 20 22   BUN 49* 51* 51*   CREATININE 1.9* 2.1* 2.0*   GLUCOSE 145* 173* 141*     Hepatic:   Recent Labs     05/02/24  1131   AST 19   ALT <5*   BILITOT 0.7   ALKPHOS 312*     Troponin: No results for input(s): \"TROPONINI\" in the last 72 hours.  BNP: No results for input(s): \"BNP\" in the last 72 hours.  Lipids: No results for input(s): \"CHOL\", \"HDL\" in the last 72 hours.    Invalid input(s): \"LDLCALCU\"  INR:   Recent Labs     05/02/24  0537 05/03/24  0541 05/04/24  0532   INR 2.5 2.8 2.6       Objective:   Vitals: BP (!) 96/51

## 2024-05-04 NOTE — PROGRESS NOTES
Warfarin Dosing - Pharmacy Consult Note  Consulting Provider: Vicky Galindo MD   Indication:  History of  VTE/PE and Atrial Fibrillation  Warfarin Dose prior to admission: 2mg Mon/Fri, 4mg all other days    Concurrent anticoagulants/antiplatelets: cilostazol (currently on hold)   Significant Drug Interactions:  allopurinol (home med)  Recent Labs     05/02/24  0537 05/02/24  1749 05/03/24  0541 05/04/24  0532   INR 2.5  --  2.8 2.6   HGB 9.5*  --  9.4* 9.3*   PLT See Reflexed IPF Result 36* See Reflexed IPF Result See Reflexed IPF Result     Recent warfarin administrations                     warfarin (COUMADIN) tablet 2 mg (mg) 2 mg Given 05/01/24 1806                   Date   INR    Dose  4/29       3.1       none  4/30       3.3       none  5/1         3.0       2mg  5/2         2.5       none  5/3         2.8       none  5/4         2.6    Assessment/Plan  (Goal INR: 2 - 3)  Platelets remain at 32 today. INR therapeutic. Per Dr. Mcdonough - jenni warfarin today. Will follow INR in AM.     Active problem list reviewed.  INR orders are placed.  Chart reviewed for pertinent labs, drug/diet interactions, and past doses.  Documentation of patient's clinical condition was reviewed.    Pharmacy Dosing:  Pharmacy will continue to follow.

## 2024-05-04 NOTE — PROGRESS NOTES
Nephrology Progress Note    Ko uQigley MD    Patient Active Problem List   Diagnosis    GI bleed    E. coli UTI    Atrial fibrillation, chronic (HCC)    Essential hypertension    Anticoagulated on Coumadin    E. coli sepsis (HCC)    Severe malnutrition (HCC)    Cellulitis    Bilateral lower leg cellulitis    Pressure ulcer of coccygeal region, stage 3 (HCC)             CHIEF COMPLAINT     azotemia    CURRENT MEDICATIONS      Current Facility-Administered Medications   Medication Dose Route Frequency Provider Last Rate Last Admin    albumin human 5% IV solution 25 g  25 g IntraVENous Q6H PRN Joel Clayton MD        bumetanide (BUMEX) injection 2 mg  2 mg IntraVENous BID Joel Clayton MD   2 mg at 05/04/24 0927    metOLazone (ZAROXOLYN) tablet 5 mg  5 mg Oral Daily Joel Clayton MD   5 mg at 05/04/24 0928    melatonin tablet 6 mg  6 mg Oral Nightly PRN Pat Jon APRN - CNP   6 mg at 05/02/24 0137    midodrine (PROAMATINE) tablet 10 mg  10 mg Oral TID  Sukhwinder Babin MD   10 mg at 05/04/24 1246    carvedilol (COREG) tablet 3.125 mg  3.125 mg Oral BID  Jeri Mattson, DANA - CNP   3.125 mg at 05/02/24 1741    allopurinol (ZYLOPRIM) tablet 100 mg  100 mg Oral Daily Vicky Galindo MD   100 mg at 05/04/24 0927    [Held by provider] cilostazol (PLETAL) tablet 100 mg  100 mg Oral Daily Vicky Galindo MD   100 mg at 04/30/24 1032    doxazosin (CARDURA) tablet 4 mg  4 mg Oral Nightly Vicky Galindo MD   4 mg at 05/03/24 2313    isosorbide mononitrate (IMDUR) extended release tablet 30 mg  30 mg Oral Daily Vicky Galindo MD   30 mg at 05/02/24 0825    pravastatin (PRAVACHOL) tablet 20 mg  20 mg Oral Nightly Vicky Galindo MD   20 mg at 05/03/24 2313    spironolactone (ALDACTONE) pre-split tablet 12.5 mg  12.5 mg Oral Daily Vicky Galindo MD   12.5 mg at 05/02/24 0825    sodium chloride flush 0.9 % injection 10 mL  10 mL IntraVENous 2 times per day  from Last 2 Encounters:   05/04/24 82.5 kg (181 lb 14.4 oz)   10/05/23 79.9 kg (176 lb 3.2 oz)     In: 1040 [P.O.:1040]  Out: 1175   In: 1040   Out: 1175 [Urine:1175]     PHYSICAL EXAM      GENERAL APPEARANCE:Awake, alert, in no acute distress  SKIN: warm and dry, no rash or erythema  PULMONARY:   diminished   CADRDIOVASCULAR: distant irregular rate  ABDOMEN: soft nontender, bowel sounds are present, no organomegaly,  no ascitic wave  EXTREMITIES: pretibial erythema    LABS   CBC:   Lab Results   Component Value Date/Time    WBC 5.5 05/04/2024 05:32 AM    RBC 3.19 05/04/2024 05:32 AM    RBC 2.99 04/18/2024 10:17 AM    HGB 9.3 05/04/2024 05:32 AM    HCT 30.3 05/04/2024 05:32 AM    MCV 95.0 05/04/2024 05:32 AM    MCH 29.2 05/04/2024 05:32 AM    MCHC 30.7 05/04/2024 05:32 AM    RDW 17.2 05/04/2024 05:32 AM    PLT See Reflexed IPF Result 05/04/2024 05:32 AM    MPV 11.4 04/30/2024 05:58 AM      BMP:   Lab Results   Component Value Date/Time     05/04/2024 05:32 AM    K 3.9 05/04/2024 05:32 AM    CL 97 05/04/2024 05:32 AM    CO2 22 05/04/2024 05:32 AM    BUN 51 05/04/2024 05:32 AM    CREATININE 2.0 05/04/2024 05:32 AM    GLUCOSE 141 05/04/2024 05:32 AM    GLUCOSE 166 04/18/2024 11:03 AM    CALCIUM 8.5 05/04/2024 05:32 AM      BNP:No results found for: \"BNP\"  PHOSPHORUS:    Lab Results   Component Value Date/Time    PHOS 3.6 02/16/2024 10:29 AM     MAGNESIUM:   Lab Results   Component Value Date/Time    MG 2.3 02/16/2024 10:29 AM     ALBUMIN:   Lab Results   Component Value Date/Time    LABALBU NEG 02/16/2024 10:29 AM     OTHER:                   URINALYSIS/URINE CHEMISTRIES    Urine Sodium:   No components found for: \"TIGRE\"  Urine Potassium:  No results found for: \"KUR\"  Urine Chloride:  No results found for: \"CLUR\"  Urine Osmolarity: No results found for: \"OSMOU\"  Urine Protein:   No components found for: \"TOTALPROTEIN\", \"URINE\"   Urine Creatinine:     Lab Results   Component Value Date/Time    LABCREA 71.7

## 2024-05-04 NOTE — PROGRESS NOTES
Progress note  [facility].,    Adult Hospitalist      Name: Bruno Bustamante  MRN: [id number]     Acct: [id number]  Room: 2025/2025-01    Admit Date: 4/29/2024  9:48 AM  PCP: Ko Bauer MD    Primary Problem  Principal Problem:    Cellulitis  Active Problems:    Bilateral lower leg cellulitis    Pressure ulcer of coccygeal region, stage 3 (HCC)  Resolved Problems:    * No resolved hospital problems. *        Assesment/ plan:     Patient admitted to [facility] floor  Cardiac monitoring  DNR CCA      Bilateral lower extremity stasis dermatitis  Lactate and procalcitonin  Blood culture shows no growth to date  IV cefepime discontinued by ID/off antibiotics  Infectious disease was involved in the care    Stasis dermatitis  Patient has chronic stasis dermatitis  Venous Doppler lower extremity negative for DVT    Acute on chronic systolic diastolic CHF  Monitoring BNP  Patient presented with bilateral lower extremity edema-worsening  IV Bumex 2 mg twice a day  Monitor creatinine  Echocardiogram shows EF 25 to 30%  Patient on doxazosin and Zaroxolyn  Patient not a candidate for Ace/ Arb/AICD  Cardiology following   Nephrology following    Ischemic cardiomyopathy  Stable   Continue Imdur    Chronic atrial fibrillation  Patient is on warfarin  Monitor heart rate    History of DVT/PE  Continue warfarin    Thrombocytopenia  Heme-onc was consulted, recommended conservative management at this point in time.  IV iron  Decision of anticoagulation as per cardiology         Hypertensive heart disease  Continue carvedilol     Mixed hyperlipidemia  Continue pravastatin    Diabetes type 2  Monitor blood glucose  SSI    Peripheral arterial disease  Stable    Continue to monitor/telemetry/CBC with differential daily/BMP daily  DVT and GI prophylaxis.  Continue medications as below      Scheduled Meds:   bumetanide  2 mg IntraVENous BID    metOLazone  5 mg Oral Daily    midodrine  10 mg Oral TID WC    carvedilol  3.125 mg Oral

## 2024-05-04 NOTE — PROGRESS NOTES
Physical Therapy  Facility/Department: [facility]  Daily Treatment Note  NAME: Bruno Bustamante  : 1930  MRN: [id number]    Date of Service: 2024    Discharge Recommendations:  Patient would benefit from continued therapy after discharge        Patient Diagnosis(es): The primary encounter diagnosis was Bilateral lower leg cellulitis. Diagnoses of Atrial fibrillation, chronic (HCC) and Acute on chronic systolic CHF (congestive heart failure) (HCC) were also pertinent to this visit.    Assessment   Assessment: Patient with decreased endurance and activity tolernace this date; rest breaks as needed secondary to increased fatigue. Patient able to perform gait training in room with CGA for increased safetty. Would benefit from continued therapy to promote increased return to prior level of function.  Activity Tolerance: Patient tolerated treatment well; Patient limited by endurance     Plan    Physical Therapy Plan  General Plan: 5-7 times per week  Current Treatment Recommendations: Strengthening;ROM; Balance training; Functional mobility training; Transfer training;Gait training; Neuromuscular re-education; Home exercise program;Endurance training;Patient/Caregiver education & training;Safety education & training;Therapeutic activities;Equipment evaluation, education, & procurement     Restrictions  Restrictions/Precautions  Restrictions/Precautions: General Precautions, Fall Risk, Up as Tolerated  Required Braces or Orthoses?: No  Position Activity Restriction  Other position/activity restrictions: RUE IV, telemetry     Subjective    Subjective  Subjective: Patient in chair visiting with daughter and son upon arrival, nurse Robert reports patient appropiate for therapy.  Orientation  Overall Orientation Status: Within Normal Limits  Cognition  Overall Cognitive Status: WFL  Attention Span: Appears intact  Memory: Appears intact  Safety Judgement: Decreased awareness of need for safety;Decreased awareness of

## 2024-05-05 PROBLEM — I48.21 PERMANENT ATRIAL FIBRILLATION (HCC): Status: ACTIVE | Noted: 2024-05-05

## 2024-05-05 PROBLEM — I50.23 ACUTE ON CHRONIC SYSTOLIC CONGESTIVE HEART FAILURE (HCC): Status: ACTIVE | Noted: 2024-05-05

## 2024-05-05 LAB
ANION GAP SERPL CALCULATED.3IONS-SCNC: 14 MMOL/L (ref 9–17)
ANION GAP SERPL CALCULATED.3IONS-SCNC: 16 MMOL/L (ref 9–17)
BASOPHILS # BLD: 0.03 K/UL (ref 0–0.2)
BASOPHILS NFR BLD: 1 % (ref 0–2)
BNP SERPL-MCNC: ABNORMAL PG/ML
BUN SERPL-MCNC: 54 MG/DL (ref 8–23)
BUN SERPL-MCNC: 57 MG/DL (ref 8–23)
BUN/CREAT SERPL: 27 (ref 9–20)
BUN/CREAT SERPL: 27 (ref 9–20)
CALCIUM SERPL-MCNC: 8.5 MG/DL (ref 8.6–10.4)
CALCIUM SERPL-MCNC: 8.5 MG/DL (ref 8.6–10.4)
CHLORIDE SERPL-SCNC: 95 MMOL/L (ref 98–107)
CHLORIDE SERPL-SCNC: 99 MMOL/L (ref 98–107)
CO2 SERPL-SCNC: 22 MMOL/L (ref 20–31)
CO2 SERPL-SCNC: 23 MMOL/L (ref 20–31)
CREAT SERPL-MCNC: 2 MG/DL (ref 0.7–1.2)
CREAT SERPL-MCNC: 2.1 MG/DL (ref 0.7–1.2)
EOSINOPHIL # BLD: 0.08 K/UL (ref 0–0.44)
EOSINOPHILS RELATIVE PERCENT: 1 % (ref 1–4)
ERYTHROCYTE [DISTWIDTH] IN BLOOD BY AUTOMATED COUNT: 17.2 % (ref 11.8–14.4)
GFR, ESTIMATED: 29 ML/MIN/1.73M2
GFR, ESTIMATED: 31 ML/MIN/1.73M2
GLUCOSE BLD-MCNC: 127 MG/DL (ref 75–110)
GLUCOSE BLD-MCNC: 151 MG/DL (ref 75–110)
GLUCOSE BLD-MCNC: 183 MG/DL (ref 75–110)
GLUCOSE BLD-MCNC: 198 MG/DL (ref 75–110)
GLUCOSE SERPL-MCNC: 122 MG/DL (ref 70–99)
GLUCOSE SERPL-MCNC: 209 MG/DL (ref 70–99)
HCT VFR BLD AUTO: 29 % (ref 40.7–50.3)
HGB BLD-MCNC: 9.3 G/DL (ref 13–17)
IMM GRANULOCYTES # BLD AUTO: 0.04 K/UL (ref 0–0.3)
IMM GRANULOCYTES NFR BLD: 1 %
INR PPP: 2.2
LYMPHOCYTES NFR BLD: 0.79 K/UL (ref 1.1–3.7)
LYMPHOCYTES RELATIVE PERCENT: 14 % (ref 24–43)
MAGNESIUM SERPL-MCNC: 2.3 MG/DL (ref 1.6–2.6)
MCH RBC QN AUTO: 29.2 PG (ref 25.2–33.5)
MCHC RBC AUTO-ENTMCNC: 32.1 G/DL (ref 28.4–34.8)
MCV RBC AUTO: 90.9 FL (ref 82.6–102.9)
MONOCYTES NFR BLD: 0.61 K/UL (ref 0.1–1.2)
MONOCYTES NFR BLD: 11 % (ref 3–12)
NEUTROPHILS NFR BLD: 73 % (ref 36–65)
NEUTS SEG NFR BLD: 4.16 K/UL (ref 1.5–8.1)
NRBC BLD-RTO: 0 PER 100 WBC
PLATELET # BLD AUTO: ABNORMAL K/UL (ref 138–453)
PLATELET, FLUORESCENCE: 35 K/UL (ref 138–453)
PLATELETS.RETICULATED NFR BLD AUTO: 5.2 % (ref 1.1–10.3)
POTASSIUM SERPL-SCNC: 3.5 MMOL/L (ref 3.7–5.3)
POTASSIUM SERPL-SCNC: 3.7 MMOL/L (ref 3.7–5.3)
PROTHROMBIN TIME: 24 SEC (ref 11.5–14.2)
RBC # BLD AUTO: 3.19 M/UL (ref 4.21–5.77)
RBC # BLD: ABNORMAL 10*6/UL
SODIUM SERPL-SCNC: 133 MMOL/L (ref 135–144)
SODIUM SERPL-SCNC: 136 MMOL/L (ref 135–144)
WBC OTHER # BLD: 5.7 K/UL (ref 3.5–11.3)

## 2024-05-05 PROCEDURE — 99233 SBSQ HOSP IP/OBS HIGH 50: CPT | Performed by: INTERNAL MEDICINE

## 2024-05-05 PROCEDURE — 6370000000 HC RX 637 (ALT 250 FOR IP): Performed by: INTERNAL MEDICINE

## 2024-05-05 PROCEDURE — 83880 ASSAY OF NATRIURETIC PEPTIDE: CPT

## 2024-05-05 PROCEDURE — 6360000002 HC RX W HCPCS: Performed by: INTERNAL MEDICINE

## 2024-05-05 PROCEDURE — 6370000000 HC RX 637 (ALT 250 FOR IP): Performed by: NURSE PRACTITIONER

## 2024-05-05 PROCEDURE — 85055 RETICULATED PLATELET ASSAY: CPT

## 2024-05-05 PROCEDURE — 83735 ASSAY OF MAGNESIUM: CPT

## 2024-05-05 PROCEDURE — 6370000000 HC RX 637 (ALT 250 FOR IP): Performed by: HOSPITALIST

## 2024-05-05 PROCEDURE — 85025 COMPLETE CBC W/AUTO DIFF WBC: CPT

## 2024-05-05 PROCEDURE — 36415 COLL VENOUS BLD VENIPUNCTURE: CPT

## 2024-05-05 PROCEDURE — 85610 PROTHROMBIN TIME: CPT

## 2024-05-05 PROCEDURE — 82947 ASSAY GLUCOSE BLOOD QUANT: CPT

## 2024-05-05 PROCEDURE — 80048 BASIC METABOLIC PNL TOTAL CA: CPT

## 2024-05-05 PROCEDURE — 2580000003 HC RX 258: Performed by: HOSPITALIST

## 2024-05-05 PROCEDURE — 1200000000 HC SEMI PRIVATE

## 2024-05-05 RX ADMIN — MIDODRINE HYDROCHLORIDE 10 MG: 10 TABLET ORAL at 16:50

## 2024-05-05 RX ADMIN — ALLOPURINOL 100 MG: 100 TABLET ORAL at 08:37

## 2024-05-05 RX ADMIN — SODIUM CHLORIDE, PRESERVATIVE FREE 10 ML: 5 INJECTION INTRAVENOUS at 20:54

## 2024-05-05 RX ADMIN — MIDODRINE HYDROCHLORIDE 10 MG: 10 TABLET ORAL at 12:21

## 2024-05-05 RX ADMIN — CARVEDILOL 3.12 MG: 3.12 TABLET, FILM COATED ORAL at 08:35

## 2024-05-05 RX ADMIN — BUMETANIDE 2 MG: 0.25 INJECTION INTRAMUSCULAR; INTRAVENOUS at 08:37

## 2024-05-05 RX ADMIN — BUMETANIDE 2 MG: 0.25 INJECTION INTRAMUSCULAR; INTRAVENOUS at 16:50

## 2024-05-05 RX ADMIN — Medication 6 MG: at 20:53

## 2024-05-05 RX ADMIN — PRAVASTATIN SODIUM 20 MG: 20 TABLET ORAL at 20:53

## 2024-05-05 RX ADMIN — DOXAZOSIN 4 MG: 4 TABLET ORAL at 20:53

## 2024-05-05 RX ADMIN — Medication 12.5 MG: at 12:21

## 2024-05-05 RX ADMIN — MIDODRINE HYDROCHLORIDE 10 MG: 10 TABLET ORAL at 08:37

## 2024-05-05 RX ADMIN — SODIUM CHLORIDE, PRESERVATIVE FREE 10 ML: 5 INJECTION INTRAVENOUS at 08:37

## 2024-05-05 RX ADMIN — METOLAZONE 5 MG: 2.5 TABLET ORAL at 08:37

## 2024-05-05 NOTE — PROGRESS NOTES
Phlebotomist came to nurses station stating while pulling off previous paper taped gauze a skin tear occurred. RN applied oil emulsion gauze to wound and band-aid over gauze.

## 2024-05-05 NOTE — PROGRESS NOTES
Progress note  [facility].,    Adult Hospitalist      Name: Bruno Bustamante  MRN: [id number]     Acct: [id number]  Room: 2025/2025-01    Admit Date: 4/29/2024  9:48 AM  PCP: Ko Bauer MD    Primary Problem  Principal Problem:    Cellulitis  Active Problems:    Permanent atrial fibrillation (HCC)    Acute on chronic systolic congestive heart failure (HCC)    Bilateral lower leg cellulitis    Pressure ulcer of coccygeal region, stage 3 (HCC)  Resolved Problems:    * No resolved hospital problems. *        Assesment/ plan:     Patient admitted to [facility] floor  Cardiac monitoring  DNR CCA      Bilateral lower extremity stasis dermatitis  Lactate and procalcitonin  Blood culture shows no growth to date  IV cefepime discontinued by ID/off antibiotics  Infectious disease was involved in the care    Stasis dermatitis  Patient has chronic stasis dermatitis  Venous Doppler lower extremity negative for DVT    Acute on chronic systolic diastolic CHF  Monitoring BNP  Patient presented with bilateral lower extremity edema-worsening  IV Bumex 2 mg twice a day  Monitor creatinine  Echocardiogram shows EF 25 to 30%  Patient on doxazosin and Zaroxolyn  Patient not a candidate for Ace/ Arb/AICD  Cardiology following   Nephrology following    Ischemic cardiomyopathy  Stable   Continue Imdur    Chronic atrial fibrillation  Patient is on warfarin  Monitor heart rate    History of DVT/PE  Continue warfarin    Thrombocytopenia  Heme-onc was consulted, recommended conservative management at this point in time.  IV iron  Decision of anticoagulation as per cardiology and recommendation of continuation of Coumadin as per cardiology        Hypertensive heart disease  Continue carvedilol     Mixed hyperlipidemia  Continue pravastatin    Diabetes type 2  Monitor blood glucose  SSI    Peripheral arterial disease  Stable    Continue to monitor/telemetry/CBC with differential daily/BMP daily  DVT and GI prophylaxis.  Continue

## 2024-05-05 NOTE — PROGRESS NOTES
[facility]   Progress Note                   Date:   5/5/2024  Patient name: Bruno Bustamante  Date of admission:  4/29/2024  9:48 AM  MRN:   [id number]  YOB: 1930  PCP: Ko Bauer MD    Reason for Admission:      Subjective:       Clinical Changes / Abnormalities: Patient lying comfortably in the bed.  Do not feel any palpitations  Denies any worsening of short of breath  Patient still in A-fib  Patient son is also in the room      Medications:   Scheduled Meds:   bumetanide  2 mg IntraVENous BID    metOLazone  5 mg Oral Daily    midodrine  10 mg Oral TID WC    carvedilol  3.125 mg Oral BID WC    allopurinol  100 mg Oral Daily    [Held by provider] cilostazol  100 mg Oral Daily    doxazosin  4 mg Oral Nightly    isosorbide mononitrate  30 mg Oral Daily    pravastatin  20 mg Oral Nightly    spironolactone  12.5 mg Oral Daily    sodium chloride flush  10 mL IntraVENous 2 times per day    warfarin placeholder: dosing by pharmacy   Other RX Placeholder    insulin lispro  0-8 Units SubCUTAneous TID WC    insulin lispro  0-4 Units SubCUTAneous Nightly     Continuous Infusions:   sodium chloride      dextrose       CBC:   Recent Labs     05/03/24  0541 05/04/24  0532 05/05/24  0619   WBC 5.2 5.5 5.7   HGB 9.4* 9.3* 9.3*   PLT See Reflexed IPF Result See Reflexed IPF Result See Reflexed IPF Result       BMP:    Recent Labs     05/04/24  0532 05/04/24  1759 05/05/24  0619   * 132* 136   K 3.9 3.7 3.7   CL 97* 94* 99   CO2 22 24 23   BUN 51* 55* 54*   CREATININE 2.0* 2.1* 2.0*   GLUCOSE 141* 157* 122*       Hepatic:   No results for input(s): \"AST\", \"ALT\", \"BILITOT\", \"ALKPHOS\" in the last 72 hours.    Invalid input(s): \"ALB\"    Troponin: No results for input(s): \"TROPONINI\" in the last 72 hours.  BNP: No results for input(s): \"BNP\" in the last 72 hours.  Lipids: No results for input(s): \"CHOL\", \"HDL\" in the last 72 hours.    Invalid input(s): \"LDLCALCU\"  INR:   Recent Labs

## 2024-05-05 NOTE — PROGRESS NOTES
Nephrology Progress Note    Ko Quigley MD    Patient Active Problem List   Diagnosis    GI bleed    E. coli UTI    Atrial fibrillation, chronic (HCC)    Essential hypertension    Anticoagulated on Coumadin    E. coli sepsis (HCC)    Severe malnutrition (HCC)    Cellulitis    Bilateral lower leg cellulitis    Pressure ulcer of coccygeal region, stage 3 (HCC)             CHIEF COMPLAINT     Azotemia    CURRENT MEDICATIONS      Current Facility-Administered Medications   Medication Dose Route Frequency Provider Last Rate Last Admin    albumin human 5% IV solution 25 g  25 g IntraVENous Q6H PRN Joel Clayton MD        bumetanide (BUMEX) injection 2 mg  2 mg IntraVENous BID Joel Clayton MD   2 mg at 05/05/24 0837    metOLazone (ZAROXOLYN) tablet 5 mg  5 mg Oral Daily Joel Clayton MD   5 mg at 05/05/24 0837    melatonin tablet 6 mg  6 mg Oral Nightly PRN Pat Jon APRN - CNP   6 mg at 05/04/24 2027    midodrine (PROAMATINE) tablet 10 mg  10 mg Oral TID  Sukhwinder Babin MD   10 mg at 05/05/24 0837    carvedilol (COREG) tablet 3.125 mg  3.125 mg Oral BID  Jeri Mattson, DANA - CNP   3.125 mg at 05/05/24 0835    allopurinol (ZYLOPRIM) tablet 100 mg  100 mg Oral Daily Vicky Galindo MD   100 mg at 05/05/24 0837    [Held by provider] cilostazol (PLETAL) tablet 100 mg  100 mg Oral Daily Vicky Galindo MD   100 mg at 04/30/24 1032    doxazosin (CARDURA) tablet 4 mg  4 mg Oral Nightly Vicky Galindo MD   4 mg at 05/04/24 2027    isosorbide mononitrate (IMDUR) extended release tablet 30 mg  30 mg Oral Daily Vicky Galindo MD   30 mg at 05/02/24 0825    pravastatin (PRAVACHOL) tablet 20 mg  20 mg Oral Nightly Vicky Galindo MD   20 mg at 05/04/24 2027    spironolactone (ALDACTONE) pre-split tablet 12.5 mg  12.5 mg Oral Daily Vicky Galindo MD   12.5 mg at 05/04/24 1704    sodium chloride flush 0.9 % injection 10 mL  10 mL IntraVENous 2 times per day  found for this or any previous visit.          ASSESSMENT     Acute on CKD stage 3b near baseline  Decompensated CHF   Rate controlled A.Fib.  C.Diff colitis  Decubitus sacral ulcer  Rate controlled A fib  chronic thrombocytopenia/anemia   Cellulitis    PLAN     Continue present therapy     Follow up labs ordered :     Please do not hesitate to call with questions [phone].    Electronically signed by Gage Patel MD on 5/5/2024 at 12:16 PM

## 2024-05-05 NOTE — PROGRESS NOTES
Warfarin Dosing - Pharmacy Consult Note  Consulting Provider: Vicky Galindo MD   Indication:  History of  VTE/PE and Atrial Fibrillation  Warfarin Dose prior to admission: 2mg Mon/Fri, 4mg all other days    Concurrent anticoagulants/antiplatelets: cilostazol (currently on hold)   Significant Drug Interactions:  allopurinol (home med)  Recent Labs     05/03/24  0541 05/04/24  0532 05/05/24  0619   INR 2.8 2.6 2.2   HGB 9.4* 9.3* 9.3*   PLT See Reflexed IPF Result See Reflexed IPF Result See Reflexed IPF Result     Recent warfarin administrations        No warfarin orders with administrations found.            Orders not given:            warfarin placeholder: dosing by pharmacy                   Date   INR    Dose  4/29       3.1       none  4/30       3.3       none  5/1         3.0       2mg  5/2         2.5       none  5/3         2.8       none  5/4         2.6       none  5/5         2.2    Assessment/Plan  (Goal INR: 2 - 3)  INR therapeutic. Per Dr. Shani arteaga warfarin today given plt 35.     Active problem list reviewed.  INR orders are placed.  Chart reviewed for pertinent labs, drug/diet interactions, and past doses.  Documentation of patient's clinical condition was reviewed.    Pharmacy Dosing:  Pharmacy will continue to follow.

## 2024-05-06 LAB
ANION GAP SERPL CALCULATED.3IONS-SCNC: 14 MMOL/L (ref 9–17)
BASOPHILS # BLD: 0.05 K/UL (ref 0–0.2)
BASOPHILS NFR BLD: 1 %
BNP SERPL-MCNC: ABNORMAL PG/ML
BUN SERPL-MCNC: 57 MG/DL (ref 8–23)
BUN/CREAT SERPL: 27 (ref 9–20)
CALCIUM SERPL-MCNC: 8.5 MG/DL (ref 8.6–10.4)
CHLORIDE SERPL-SCNC: 97 MMOL/L (ref 98–107)
CO2 SERPL-SCNC: 25 MMOL/L (ref 20–31)
CREAT SERPL-MCNC: 2.1 MG/DL (ref 0.7–1.2)
EOSINOPHIL # BLD: 0.1 K/UL (ref 0–0.4)
EOSINOPHILS RELATIVE PERCENT: 2 % (ref 1–4)
ERYTHROCYTE [DISTWIDTH] IN BLOOD BY AUTOMATED COUNT: 17.4 % (ref 11.8–14.4)
FERRITIN SERPL-MCNC: 183 NG/ML (ref 30–400)
GFR, ESTIMATED: 29 ML/MIN/1.73M2
GLUCOSE BLD-MCNC: 148 MG/DL (ref 75–110)
GLUCOSE BLD-MCNC: 160 MG/DL (ref 75–110)
GLUCOSE BLD-MCNC: 179 MG/DL (ref 75–110)
GLUCOSE BLD-MCNC: 213 MG/DL (ref 75–110)
GLUCOSE SERPL-MCNC: 149 MG/DL (ref 70–99)
HCT VFR BLD AUTO: 29.1 % (ref 40.7–50.3)
HGB BLD-MCNC: 9 G/DL (ref 13–17)
IMM GRANULOCYTES # BLD AUTO: 0.05 K/UL (ref 0–0.3)
IMM GRANULOCYTES NFR BLD: 1 %
INR PPP: 2.1
LYMPHOCYTES NFR BLD: 0.73 K/UL (ref 1–4.8)
LYMPHOCYTES RELATIVE PERCENT: 14 % (ref 24–44)
MAGNESIUM SERPL-MCNC: 2.5 MG/DL (ref 1.6–2.6)
MCH RBC QN AUTO: 28.7 PG (ref 25.2–33.5)
MCHC RBC AUTO-ENTMCNC: 30.9 G/DL (ref 28.4–34.8)
MCV RBC AUTO: 92.7 FL (ref 82.6–102.9)
MONOCYTES NFR BLD: 0.62 K/UL (ref 0.2–0.8)
MONOCYTES NFR BLD: 12 % (ref 1–7)
NEUTROPHILS NFR BLD: 70 % (ref 36–66)
NEUTS SEG NFR BLD: 3.65 K/UL (ref 1.8–7.7)
NRBC BLD-RTO: 0 PER 100 WBC
PLATELET # BLD AUTO: ABNORMAL K/UL (ref 138–453)
PLATELET, FLUORESCENCE: 37 K/UL (ref 138–453)
PLATELETS.RETICULATED NFR BLD AUTO: 5.6 % (ref 1.1–10.3)
PMV BLD AUTO: ABNORMAL FL (ref 8.1–13.5)
POTASSIUM SERPL-SCNC: 3.4 MMOL/L (ref 3.7–5.3)
PROTHROMBIN TIME: 23.1 SEC (ref 11.5–14.2)
RBC # BLD AUTO: 3.14 M/UL (ref 4.21–5.77)
SODIUM SERPL-SCNC: 136 MMOL/L (ref 135–144)
WBC OTHER # BLD: 5.2 K/UL (ref 3.5–11.3)

## 2024-05-06 PROCEDURE — 97530 THERAPEUTIC ACTIVITIES: CPT

## 2024-05-06 PROCEDURE — 82947 ASSAY GLUCOSE BLOOD QUANT: CPT

## 2024-05-06 PROCEDURE — 6370000000 HC RX 637 (ALT 250 FOR IP): Performed by: HOSPITALIST

## 2024-05-06 PROCEDURE — 83880 ASSAY OF NATRIURETIC PEPTIDE: CPT

## 2024-05-06 PROCEDURE — 85025 COMPLETE CBC W/AUTO DIFF WBC: CPT

## 2024-05-06 PROCEDURE — 82728 ASSAY OF FERRITIN: CPT

## 2024-05-06 PROCEDURE — P9041 ALBUMIN (HUMAN),5%, 50ML: HCPCS | Performed by: INTERNAL MEDICINE

## 2024-05-06 PROCEDURE — 6360000002 HC RX W HCPCS: Performed by: INTERNAL MEDICINE

## 2024-05-06 PROCEDURE — 85610 PROTHROMBIN TIME: CPT

## 2024-05-06 PROCEDURE — 80048 BASIC METABOLIC PNL TOTAL CA: CPT

## 2024-05-06 PROCEDURE — 36415 COLL VENOUS BLD VENIPUNCTURE: CPT

## 2024-05-06 PROCEDURE — 6370000000 HC RX 637 (ALT 250 FOR IP): Performed by: INTERNAL MEDICINE

## 2024-05-06 PROCEDURE — 1200000000 HC SEMI PRIVATE

## 2024-05-06 PROCEDURE — 2580000003 HC RX 258: Performed by: HOSPITALIST

## 2024-05-06 PROCEDURE — 85055 RETICULATED PLATELET ASSAY: CPT

## 2024-05-06 PROCEDURE — 99222 1ST HOSP IP/OBS MODERATE 55: CPT | Performed by: NURSE PRACTITIONER

## 2024-05-06 PROCEDURE — 6360000002 HC RX W HCPCS: Performed by: NURSE PRACTITIONER

## 2024-05-06 PROCEDURE — 97535 SELF CARE MNGMENT TRAINING: CPT

## 2024-05-06 PROCEDURE — 97116 GAIT TRAINING THERAPY: CPT

## 2024-05-06 PROCEDURE — 6370000000 HC RX 637 (ALT 250 FOR IP): Performed by: NURSE PRACTITIONER

## 2024-05-06 PROCEDURE — 97110 THERAPEUTIC EXERCISES: CPT

## 2024-05-06 PROCEDURE — 83735 ASSAY OF MAGNESIUM: CPT

## 2024-05-06 RX ORDER — POTASSIUM CHLORIDE 20 MEQ/1
20 TABLET, EXTENDED RELEASE ORAL ONCE
Status: COMPLETED | OUTPATIENT
Start: 2024-05-06 | End: 2024-05-06

## 2024-05-06 RX ADMIN — EPOETIN ALFA 4000 UNITS: 4000 SOLUTION INTRAVENOUS; SUBCUTANEOUS at 17:42

## 2024-05-06 RX ADMIN — MIDODRINE HYDROCHLORIDE 10 MG: 10 TABLET ORAL at 11:01

## 2024-05-06 RX ADMIN — SENNOSIDES 8.6 MG: 8.6 TABLET, FILM COATED ORAL at 11:01

## 2024-05-06 RX ADMIN — Medication 12.5 MG: at 11:02

## 2024-05-06 RX ADMIN — PRAVASTATIN SODIUM 20 MG: 20 TABLET ORAL at 20:30

## 2024-05-06 RX ADMIN — CARVEDILOL 3.12 MG: 3.12 TABLET, FILM COATED ORAL at 11:02

## 2024-05-06 RX ADMIN — ALLOPURINOL 100 MG: 100 TABLET ORAL at 11:01

## 2024-05-06 RX ADMIN — SODIUM CHLORIDE, PRESERVATIVE FREE 10 ML: 5 INJECTION INTRAVENOUS at 11:05

## 2024-05-06 RX ADMIN — BUMETANIDE 2 MG: 0.25 INJECTION INTRAMUSCULAR; INTRAVENOUS at 20:28

## 2024-05-06 RX ADMIN — SODIUM CHLORIDE, PRESERVATIVE FREE 10 ML: 5 INJECTION INTRAVENOUS at 20:30

## 2024-05-06 RX ADMIN — MIDODRINE HYDROCHLORIDE 10 MG: 10 TABLET ORAL at 14:29

## 2024-05-06 RX ADMIN — MIDODRINE HYDROCHLORIDE 10 MG: 10 TABLET ORAL at 17:42

## 2024-05-06 RX ADMIN — METOLAZONE 5 MG: 2.5 TABLET ORAL at 11:02

## 2024-05-06 RX ADMIN — POTASSIUM CHLORIDE 20 MEQ: 1500 TABLET, EXTENDED RELEASE ORAL at 17:42

## 2024-05-06 RX ADMIN — DOXAZOSIN 4 MG: 4 TABLET ORAL at 20:30

## 2024-05-06 RX ADMIN — ALBUMIN (HUMAN) 25 G: 12.5 INJECTION, SOLUTION INTRAVENOUS at 18:17

## 2024-05-06 RX ADMIN — BUMETANIDE 2 MG: 0.25 INJECTION INTRAMUSCULAR; INTRAVENOUS at 11:04

## 2024-05-06 NOTE — PROGRESS NOTES
much help is needed turning from your back to your side while in a flat bed without using bedrails?: A Little  How much help is needed moving from lying on your back to sitting on the side of a flat bed without using bedrails?: A Little  How much help is needed moving to and from a bed to a chair?: A Little  How much help is needed standing up from a chair using your arms?: A Lot  How much help is needed walking in hospital room?: A Little  How much help is needed climbing 3-5 steps with a railing?: A Lot  AM-PAC Inpatient Mobility Raw Score : 16  AM-PAC Inpatient T-Scale Score : 40.78  Mobility Inpatient CMS 0-100% Score: 54.16  Mobility Inpatient CMS G-Code Modifier : CK         Therapy Time   Individual Concurrent Group Co-treatment   Time In 1221         Time Out 1259         Minutes 38                 Shruti Maritnez, PTA

## 2024-05-06 NOTE — CARE COORDINATION
Social work: Revisited with patient and son at bedside to discuss rtn to [facility] vs SNF.   Agreeable to rehab- choice given of [facility]. Referral sent.   BARRY started.     Update: [facility] reviewing, concern is cost of Aranesp vaccine; [facility]).  [facility] is checking if this is okay to proceed with referral.     UPDATE 15:00- Procrit also added today by hemoc.  This, along with Aranesp is a cost barrier for SNF.  RN to reach out to oncology to see if either can be held while at rehab.     UPDATE 16:39- RN clarified Procrit is while patient is at [facility]; Aranesp would continue every 30 days.  Brittany/[facility] to check if facility can accept.

## 2024-05-06 NOTE — ACP (ADVANCE CARE PLANNING)
Advance Care Planning      Palliative Medicine Provider (MD/NP)  Advance Care Planning (ACP) Conversation      Date of Conversation: 05/06/24  The patient and/or authorized decision maker consented to a voluntary Advance Care Planning conversation.   Individuals present for the conversation:   Patient with decision making capacity and Legal healthcare agent named below    Legal Healthcare Agent(s):  Heath Bustamante - son - [phone]  Karl Barajas - bernrado     ACP documents available in EMR prior to discussion:  -Portable DNR    Primary Palliative Diagnosis(es):  CHF    Conversation Summary:  Patient confirms code status of DNR-CCA with no intubation. Patient does verbalize he is unsure if he would want to pursue further treatment in the hospital in the future if needed. Discussed differences between DNR-CCA and DNR-CC. Patient and family will consider their options.   POA paper work in physical chart. Patient's son Heath is primary decision maker.     Resuscitation Status:    Code Status: DNR-CCA    Outcomes / Completed Documentation:  An explanation of advance directives and their importance was provided and the following forms completed:    -No new documents completed.    If new document completed, original was provided to patient and/or family member.    Copy was placed for scanning into the [facility] EMR.      I spent 10 minutes providing separately identifiable ACP services with the patient and/or surrogate decision maker in a voluntary, in-person conversation discussing the patient's wishes and goals as detailed in the above note.       Brandi Hernández, DANA - CNP

## 2024-05-06 NOTE — PROGRESS NOTES
Nutrition Assessment     Type and Reason for Visit: Reassess    Nutrition Recommendations/Plan:   Diet; Regular, low sodium (2g); 1500ml  ONS Clear w/ breakfast and dinner  Monitor p/o intakes, ONS acceptance, and labs     Malnutrition Assessment:  Malnutrition Status: At risk for malnutrition (Comment)    Nutrition Assessment:  Patients current diet is Regular; Low sodium (2g); 1500ml. Upon meeting with patient, he reported his appetite has been low but he has been able to eat a little. Patient tray in room showed he is consuming 1-25% of meals. Patient reported some constipation which son attributed to patient having poor intake. Patients current weight is 185lb (-3.2% weight change since admission). Patient reported strongly disliking Ensure supplement, but said it tasted slightly better with ice. Recommended patient try Ensure Clear with ice instead and encouraged increasing p/o intakes. Patient agreeable to try. Will continue to monitor p/o intakes, ONS acceptance, and labs.    Estimated Daily Nutrient Needs:  Energy (kcal):  3964-7918 kcal (20-23 kcal/kg) Weight Used for Energy Requirements: Current     Protein (g):   gm of protein (1.3-1.5 gm/kg) Weight Used for Protein Requirements: Ideal        Fluid (ml/day):  9576-6733 mL Method Used for Fluid Requirements: 1 ml/kcal    Nutrition Related Findings:   Edema: +2 ptting BLE. Active bowel sounds. Poor oral intakes. Wound Type: Stage III, Pressure Injury    Current Nutrition Therapies:    ADULT DIET; Regular; Low Sodium (2 gm); 1500 ml  ADULT ORAL NUTRITION SUPPLEMENT; Breakfast, Dinner; Clear Liquid Oral Supplement    Anthropometric Measures:  Height: 177.8 cm (5' 10\")  Current Body Wt: 84.3 kg (185 lb 13 oz)   BMI: 26.7    Nutrition Diagnosis:   Increased nutrient needs related to increase demand for energy/nutrients as evidenced by wounds    Nutrition Interventions:   Food and/or Nutrient Delivery: Continue Current Diet, Modify Oral Nutrition

## 2024-05-06 NOTE — PROGRESS NOTES
SPIRITUAL CARE DEPARTMENT [facility]  PROGRESS NOTE    Room # 2025/2025-01   Name: Bruno Bustamante            Mu-ism: Latter day     Reason for visit:  Rounding    I visited the patient.    Admit Date & Time: 4/29/2024  9:48 AM    Assessment:  Bruno Bustamante is a 89 y.o. male in the hospital because Cellulitis. Upon entering the room the patient was sitting up in bed. The patient had just finished eating a dinner (P&J sandwich).       Intervention:  I introduced myself and my title as  I offered space for the patient  to express feelings, needs, and concerns and provided a ministry presence. The  asked how the patient was doing and if he was cold. The patient said ok, and that he liked to be warm. The  talked briefly, and found out the patient liked golf, and that his wife passed away 24 months ago; they would have been  60 years. The  gave her condolences and then prayed for the patient per his consent. The  found out the patient might be leaving to do rehab at [facility] tomorrow; so the  left with uplifting words, and left a prayer card for later.       Outcome:  The patient thanked the  for coming.    Plan:  Chaplains will remain available to offer spiritual and emotional support as needed.    Electronically signed by Chaplain Rio, on 5/6/2024 at 7:32 PM.  Spiritual Care Department  [facility]

## 2024-05-06 NOTE — PROGRESS NOTES
[facility] Hematology and Oncology - Daily Progress Note  2024, 8:33 AM    Admit Date: 2024  PCP: Ko Bauer MD    Impression/Plan:   Anemia, thrombocytopenia  -Chronic. Anemia is at baseline. Thrombocytopenia is slightly less than baseline, likely d/t acute infection +/- effects of antibiotics. Cannot exclude underlying marrow disorder   -Hgb=9.0(9.5, 8.8, 8.5)  -Plts=37k(35k, 32k, 32k, 39k...107k). Would recommend holding AC until Plt count recovers to 50k or greater   -Fibrinogen WNL  -B12/folate WNL  -No evidence of hemolysis   -Paraprotein studies unremarkable   -US liver showed possible cirrhosis   -Given his age, we would advise holding off on bone marrow unless absolutely necessary. Patient verbalizes he would not want this done regardless  -[facility]O [facility], will [facility] dose today   -Continue to monitor labs daily and transfuse for hgb<7, plts<10k or <50k with bleeding  -[facility]     Continue management of other medical issues as per appropriate services. No objection to discharge when cleared by others.      Thank you for this consult. Patient discussed with Dr. Sexton. Please call with questions.        Subjective/Interval History:   Patient seen and examined at bedside, currently up to chair, no visitors present. Patient is afebrile. VSS. Maintained on RA. No distress noted   Physical Examination :   Patient Vitals for the past 96 hrs (Last 3 readings):   Weight   24 0404 84.3 kg (185 lb 12.8 oz)   24 0239 83.6 kg (184 lb 4.8 oz)   24 0418 82.5 kg (181 lb 14.4 oz)      Temperature Range: Temp: 97.5 °F (36.4 °C) Temp  Av.9 °F (36.6 °C)  Min: 97.5 °F (36.4 °C)  Max: 98.1 °F (36.7 °C)  Weight change: 0.68 kg (1 lb 8 oz)    Physical Exam   Physical Exam  Vitals and nursing note reviewed.   Constitutional:       General: He is not in acute distress.     Appearance: He is normal weight. He    NEUTROABS 4.06 4.16 3.65       Recent Labs     05/04/24  0532 05/05/24  0619 05/06/24  0613   PROTIME 27.1* 24.0* 23.1*   INR 2.6 2.2 2.1       Recent Labs     05/04/24  0532 05/04/24  1759 05/05/24  0619 05/05/24  1858 05/06/24  0613   K 3.9   < > 3.7 3.5* 3.4*   CL 97*   < > 99 95* 97*   CO2 22   < > 23 22 25   BUN 51*   < > 54* 57* 57*   CREATININE 2.0*   < > 2.0* 2.1* 2.1*   CALCIUM 8.5*   < > 8.5* 8.5* 8.5*   INR 2.6  --  2.2  --  2.1   MG  --   --   --  2.3 2.5    < > = values in this interval not displayed.       TSH: No results found for: \"TSH\"  VITAMIN B12:   Lab Results   Component Value Date    JGEOCLSL95 1277 (H) 05/02/2024     FOLATE:    Lab Results   Component Value Date    FOLATE 8.5 05/02/2024     IRON:   Lab Results   Component Value Date    IRON 44 (L) 05/02/2024    TIBC 240 (L) 05/02/2024    FERRITIN 79.5 02/16/2024       Imaging Studies:     [unfilled]   Medications:      bumetanide  2 mg IntraVENous BID    metOLazone  5 mg Oral Daily    midodrine  10 mg Oral TID WC    carvedilol  3.125 mg Oral BID WC    allopurinol  100 mg Oral Daily    [Held by provider] cilostazol  100 mg Oral Daily    doxazosin  4 mg Oral Nightly    isosorbide mononitrate  30 mg Oral Daily    pravastatin  20 mg Oral Nightly    spironolactone  12.5 mg Oral Daily    sodium chloride flush  10 mL IntraVENous 2 times per day    warfarin placeholder: dosing by pharmacy   Other RX Placeholder    insulin lispro  0-8 Units SubCUTAneous TID     insulin lispro  0-4 Units SubCUTAneous Nightly     Cielo Best, APRN - CNP  [facility] Hematology and Oncology

## 2024-05-06 NOTE — PROGRESS NOTES
Physical Therapy  DATE: 2024    NAME: Bruno Bustamante  MRN: [id number]   : 1930    Patient not seen this date for Physical Therapy due to:      [] Cancel by RN or physician due to:    [] Hemodialysis    [] Critical Lab Value Level     [] Blood transfusion in progress    [] Acute or unstable cardiovascular status   _MAP < 55 or more than >115  _HR < 40 or > 130    [] Acute or unstable pulmonary status   -FiO2 > 60%   _RR < 5 or >40    _O2 sats < 85%    [] Strict Bedrest    [] Off Unit for surgery or procedure    [] Off Unit for testing       [] Pending imaging to R/O fracture    [] Refusal by Patient      [x] Other (Palliative meeting soon per RN)      [] PT being discontinued at this time. Patient independent. No further needs.     [] PT being discontinued at this time as the patient has been transferred to hospice care. No further needs.      Shruti Martinez, PTA

## 2024-05-06 NOTE — PROGRESS NOTES
Occupational Therapy  Facility/Department: [facility]  Rehabilitation Occupational Therapy Daily Treatment Note    Date: 24  Patient Name: Bruno Bustamante       Room:   MRN: [id number]  Account: [id number]   : 1930  (89 y.o.) Gender: male                    Past Medical History:  has a past medical history of Achilles tendon pain, Anemia, Atrial fibrillation (HCC), Cardiomyopathy (HCC), Cardiomyopathy (HCC), Cataract, CHF (congestive heart failure) (HCC), Chronic kidney disease, COPD (chronic obstructive pulmonary disease) (HCC), Dental disease, Diabetes mellitus (HCC), Diabetic neuropathy (HCC), DVT (deep venous thrombosis) (HCC), Edema, Entropion, Gout, Hearing loss, Hyperlipidemia, Hypertension, and Ribs, multiple fractures.  Past Surgical History:   has a past surgical history that includes hernia repair (); lipoma resection (); Colonoscopy (); Achilles tendon surgery (Left, ); Tonsillectomy (); and Eye surgery (Right, 2016).    Restrictions  Restrictions/Precautions: General Precautions, Fall Risk, Up as Tolerated  Other position/activity restrictions: RUE IV, telemetry, B knee high evita hose  Required Braces or Orthoses?: No    Subjective  Subjective: Pt in bed upon arrival and agreeable to therapy.  Restrictions/Precautions: General Precautions;Fall Risk;Up as Tolerated             Objective     Cognition  Overall Cognitive Status: WFL  Orientation  Overall Orientation Status: Within Functional Limits   Perception  Overall Perceptual Status: WFL     ADL  Grooming/Oral Hygiene  Assistance Level: Set-up;Verbal cues;Increased time to complete;Stand by assist  Skilled Clinical Factors: standing at sink to complete oral care, washed face/hands using RW  Upper Extremity Dressing  Assistance Level: Set-up;Minimal assistance  Skilled Clinical Factors: standing with walker to change gowns          Functional Mobility  Device: Rolling walker  Activity: To/From

## 2024-05-06 NOTE — PROGRESS NOTES
Warfarin Dosing - Pharmacy Consult Note  Consulting Provider: Vicky Galindo MD   Indication:  History of  VTE/PE and Atrial Fibrillation  Warfarin Dose prior to admission: 2mg Mon/Fri, 4mg all other days    Concurrent anticoagulants/antiplatelets: cilostazol (currently on hold)   Significant Drug Interactions:   allopurinol (home med)  Recent Labs     05/04/24  0532 05/05/24  0619 05/06/24  0613   INR 2.6 2.2 2.1   HGB 9.3* 9.3* 9.0*   PLT See Reflexed IPF Result See Reflexed IPF Result See Reflexed IPF Result     Recent warfarin administrations        No warfarin orders with administrations found.            Orders not given:            warfarin placeholder: dosing by pharmacy                   Date   INR    Dose  4/29       3.1       none  4/30       3.3       none  5/1         3.0       2mg  5/2         2.5       none  5/3         2.8       none  5/4         2.6       none  5/5         2.2       none     Assessment/Plan  (Goal INR: 2 - 3)  INR therapeutic. Per heme - hold warfarin for plt <50. This hold parameter was communicated with cardiology and primary team. Discharge plan is to get repeat CBC, INR 1 day after discharge and [facility].     Active problem list reviewed.  INR orders are placed.  Chart reviewed for pertinent labs, drug/diet interactions, and past doses.  Documentation of patient's clinical condition was reviewed.    Pharmacy Dosing:  Pharmacy will continue to follow.

## 2024-05-06 NOTE — PROGRESS NOTES
Progress note  [facility]    Adult Hospitalist      Name: Bruno Bustamante  MRN: [id number]     Acct: [id number]  Room: 2025/2025-01    Admit Date: 4/29/2024  9:48 AM  PCP: Ko Bauer MD    Primary Problem  Principal Problem:    Cellulitis  Active Problems:    Permanent atrial fibrillation (HCC)    Acute on chronic systolic congestive heart failure (HCC)    Bilateral lower leg cellulitis    Pressure ulcer of coccygeal region, stage 3 (HCC)  Resolved Problems:    * No resolved hospital problems. *        Assesment/ plan:     Patient admitted to [facility] floor  Cardiac monitoring  DNR CCA      Bilateral lower extremity stasis dermatitis  Lactate and procalcitonin  Blood culture shows no growth to date  IV cefepime discontinued by ID/off antibiotics  Infectious disease was involved in the care    Stasis dermatitis  Patient has chronic stasis dermatitis  Venous Doppler lower extremity negative for DVT    Acute on chronic systolic diastolic CHF  Monitoring BNP  Patient presented with bilateral lower extremity edema-worsening  IV Bumex 2 mg twice a day  Monitor creatinine  Echocardiogram shows EF 25 to 30%  Patient on doxazosin and Zaroxolyn  Patient not a candidate for Ace/ Arb/AICD  Cardiology following   Nephrology following  Palliative care consult    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  IV iron  Decision of anticoagulation as per cardiology and recommendation of continuation of Coumadin as per cardiology      Chronic anemia  At baseline    Hypertensive heart disease  Continue carvedilol     Mixed hyperlipidemia  Continue pravastatin    Diabetes type  cold, changes in urination or bowel habits    HPI:      Bruno Bustamante is a 89 y.o.  male who presents with Leg Swelling    89-year-old gentleman presented to ER due to worsening peripheral edema and shortness of breath.  Past medical history significant for CHF, stasis dermatitis.  Family also reported that they have noticed worsening erythema and swelling.  Patient denies any fever, chills, cough, cold, changes in urination, bowel habit.    Patient admitted for further management      I have personally reviewed the past medical history, past surgical history, medications, social history, and family history, and summarized in the note.    Review of Systems:     All 10 point system is reviewed and negative otherwise mentioned in HPI.      Past Medical History:     Past Medical History:   Diagnosis Date    Achilles tendon pain 1980's    Repair, Had DVT post surgery    Anemia     Atrial fibrillation (HCC)     Cardiomyopathy (HCC)     Cardiomyopathy (HCC)     Cataract     CHF (congestive heart failure) (HCC)     Chronic kidney disease     Stage 3    COPD (chronic obstructive pulmonary disease) (HCC)     Dental disease     Diabetes mellitus (HCC)     Type 2     Diabetic neuropathy (HCC)     Bilateral Lower legs distal to knees    DVT (deep venous thrombosis) ([facility]) 1980's    With PE     Edema     Bilateral Legs    Entropion 06/2016    Right Lower Lid    Gout     Hearing loss     Hyperlipidemia     Hypertension     Ribs, multiple fractures     Per [facility] records        Past Surgical History:     Past Surgical History:   Procedure Laterality Date    ACHILLES TENDON SURGERY Left 1980's    Had DVT post surgery    COLONOSCOPY  1990's    EYE SURGERY Right 07/12/2016    Repair Entropion eye, Rt lower lid. Surgeon Elias Redman at [facility]    HERNIA REPAIR  1980     2 Lt inguinal    LIPOMA RESECTION  1980's    TONSILLECTOMY  1940's        Medications Prior to Admission:       Prior to Admission

## 2024-05-06 NOTE — PROGRESS NOTES
Nephrology Progress Note    Ko Quigley MD    Patient Active Problem List   Diagnosis    GI bleed    E. coli UTI    Atrial fibrillation, chronic (HCC)    Essential hypertension    Anticoagulated on Coumadin    E. coli sepsis (HCC)    Severe malnutrition (HCC)    Cellulitis    Bilateral lower leg cellulitis    Pressure ulcer of coccygeal region, stage 3 (HCC)    Permanent atrial fibrillation (HCC)    Acute on chronic systolic congestive heart failure (HCC)             CHIEF COMPLAINT     Azotemia    CURRENT MEDICATIONS      Current Facility-Administered Medications   Medication Dose Route Frequency Provider Last Rate Last Admin    epoetin calos (EPOGEN;PROCRIT) injection 4,000 Units  4,000 Units SubCUTAneous Once per day on Mon Wed Fri Cielo Best APRN - CNP        albumin human 5% IV solution 25 g  25 g IntraVENous Q6H PRN Joel Clayton MD        bumetanide (BUMEX) injection 2 mg  2 mg IntraVENous BID Joel Clayton MD   2 mg at 05/06/24 1104    metOLazone (ZAROXOLYN) tablet 5 mg  5 mg Oral Daily Joel Clayton MD   5 mg at 05/06/24 1102    melatonin tablet 6 mg  6 mg Oral Nightly PRN Pat Jon APRN - CNP   6 mg at 05/05/24 2053    midodrine (PROAMATINE) tablet 10 mg  10 mg Oral TID  Sukhwinder Babin MD   10 mg at 05/06/24 1101    carvedilol (COREG) tablet 3.125 mg  3.125 mg Oral BID  Jeri Mattson, DANA - CNP   3.125 mg at 05/06/24 1102    allopurinol (ZYLOPRIM) tablet 100 mg  100 mg Oral Daily Vicky Galindo MD   100 mg at 05/06/24 1101    [Held by provider] cilostazol (PLETAL) tablet 100 mg  100 mg Oral Daily Vicky Galindo MD   100 mg at 04/30/24 1032    doxazosin (CARDURA) tablet 4 mg  4 mg Oral Nightly Vicky Galindo MD   4 mg at 05/05/24 2053    isosorbide mononitrate (IMDUR) extended release tablet 30 mg  30 mg Oral Daily Vicky Galindo MD   30 mg at 05/02/24 0825    pravastatin (PRAVACHOL) tablet 20 mg  20 mg Oral Nightly Mateo,

## 2024-05-06 NOTE — CONSULTS
Palliative Care Inpatient Consult    NAME:  Bruno Bustamante  MEDICAL RECORD NUMBER:  [id number]  AGE: 89 y.o.   GENDER: male  : 1930  TODAY'S DATE:  2024    Reasons for Consultation:    Symptom and/or pain management  Provision of information regarding PC and/or hospice philosophies  Complex, time-intensive communication and interdisciplinary psychosocial support  Clarification of goals of care and/or assistance with difficult decision-making  Guidance in regards to resources and transition(s)    Members of PC team contributing to this consultation are: MARIELLA Yang    Plan      Palliative Interaction:    I met with patient this morning at the bedside.  When I entered the room, he was sitting up in the chair.  After introductions, I explained my role with palliative care.  Patient tells me that he came to the emergency department for leg swelling that has been present for the last month.      Patient tells me he lives alone at [facility].  He has 8 children.  He states that he has healthcare power of  and his son Heath is primary decision maker with Robert as alternate.  We did discuss CODE STATUS and he confirms DNR CCA.  He tells me that he would not want intubated if needed.  When asking him if he would want to return to the hospital for further care if needed, he tells me he is not sure.  He would want to discuss this with his son Heath.    Patient tells me that he has shortness of breath with exertion which is new since admission.  He has been seen by cardiology and nephrology with treatment for CHF.  He tells me he is aware that his heart function is low.  He states that this morning someone told him that he may have to go to rehab which he is open to.  He states may be [facility] as he has been there before.    He has fair appetite.  He is hopeful that they will be able to help manage his swelling and rehab with compression stockings.  He asked if I would be able to  spontaneously moving all four extremities  Skin: No gross lesions, rashes, bruising or bleeding on exposed skin area  Extremities: 3-4+ pitting edema bilateral lower extremities with compression socks on, peripheral pulses palpable, no calf pain with palpation  Psych: normal affect    Palliative Performance Scale:  ___100% Full ambulation; normal activity/No disease; full self-care; normal intake; LOC full  ___90% full ambulation; normal activity/some disease; full self-care; normal intake; LOC full  ___80% ambulation full; normal activity with effort/some disease; full self-care; normal/reduced intake; LOC full  ___70% ambulation reduced; cannot do normal work/some disease; full self-care; normal/reduce intake; LOC full  ___60%  Ambulation reduced; Significant disease; Can't do hobbies/housework; intake normal or reduced; occasional assist; LOC full/confusion  __X_50%  Mainly sit/lie; Extensive disease; Can't do any work; Considerable assist; intake normal or reduced; LOC full/confusion  ___40%  Mainly in bed; Extensive disease; Mainly assist; intake normal or reduced; LOC full/confusion   ___30%  Bed Bound; Extensive disease; Total care; intake reduced; LOC full/confusion  ___20%  Bed Bound; Extensive disease; Total care; intake minimal; Drowsy/coma  ___10%  Bed Bound; Extensive disease; Total care; Mouth care only; Drowsy/coma  ___0       Death    Principle Problem/Diagnosis:  Principal Problem:    Cellulitis  Active Problems:    Permanent atrial fibrillation (HCC)    Acute on chronic systolic congestive heart failure (HCC)    Bilateral lower leg cellulitis    Pressure ulcer of coccygeal region, stage 3 (HCC)  Resolved Problems:    * No resolved hospital problems. *      Please call with any palliative questions or concerns.  Palliative Care Team is available via perfect serve or via phone.     This note has been dictated by dragon, typing errors may be a possibility.  The total time I spent in seeing the

## 2024-05-06 NOTE — PROGRESS NOTES
SPIRITUAL CARE DEPARTMENT [facility]  PROGRESS NOTE    Room # 2025/2025-01   Name: Bruno Bustamante               Reason for visit:  Pall Care    I visited the patient.    Admit Date & Time: 4/29/2024  9:48 AM    Assessment:  Bruno Bustamante is a 89 y.o. male in the hospital because of cellulitis. Upon entering the room the patient was sitting up in his chair alert and oriented (no family members present).       Intervention:  I introduced myself and my title as arelis Killian from the spiritual care department. The patient and writer engaged in a brief conversation and the patient shares he is doing \"ok.\"    The patient has a peaceful, calm, and pleasant attitude and spirit. The patient states he currently has no immediate needs or request, and is thankful for the visit (the patient inquired about communion). The writer provided words of encouragement and hope; writer also stated he will pray for continued healing, comfort, rest, and inner strength.    Outcome:  The patient is thankful for the visit and support.    Plan:  Chaplains will remain available to offer spiritual and emotional support as needed.    Electronically signed by Chaplain Partida Jr, on 5/6/2024 at 11:30 AM.  Spiritual Care Department  [facility]     05/06/24 1125   Encounter Summary   Service Provided For Patient   Referral/Consult From Clergy/;Rounding   Last Encounter  05/06/24   Complexity of Encounter Moderate   Begin Time 1030   End Time  1040   Total Time Calculated 10 min   Spiritual/Emotional needs   Type Spiritual Support   Palliative Care   Type Palliative Care, Initial/Spiritual Assessment   Assessment/Intervention/Outcome   Assessment Calm;Coping;Peaceful   Intervention Nurtured Hope;Read/Provided Scripture;Sustaining Presence/Ministry of presence   Outcome Engaged in conversation;Expressed Gratitude   Plan and Referrals   Plan/Referrals Continue to visit, (comment);Continue Support  (comment)

## 2024-05-07 LAB
ANION GAP SERPL CALCULATED.3IONS-SCNC: 16 MMOL/L (ref 9–17)
BASOPHILS # BLD: 0.05 K/UL (ref 0–0.2)
BASOPHILS NFR BLD: 1 %
BNP SERPL-MCNC: ABNORMAL PG/ML
BUN SERPL-MCNC: 61 MG/DL (ref 8–23)
BUN/CREAT SERPL: 29 (ref 9–20)
CALCIUM SERPL-MCNC: 8.5 MG/DL (ref 8.6–10.4)
CHLORIDE SERPL-SCNC: 94 MMOL/L (ref 98–107)
CO2 SERPL-SCNC: 23 MMOL/L (ref 20–31)
CREAT SERPL-MCNC: 2.1 MG/DL (ref 0.7–1.2)
EOSINOPHIL # BLD: 0.09 K/UL (ref 0–0.4)
EOSINOPHILS RELATIVE PERCENT: 2 % (ref 1–4)
ERYTHROCYTE [DISTWIDTH] IN BLOOD BY AUTOMATED COUNT: 17.3 % (ref 11.8–14.4)
GFR, ESTIMATED: 29 ML/MIN/1.73M2
GLUCOSE BLD-MCNC: 104 MG/DL (ref 75–110)
GLUCOSE BLD-MCNC: 138 MG/DL (ref 75–110)
GLUCOSE BLD-MCNC: 164 MG/DL (ref 75–110)
GLUCOSE BLD-MCNC: 234 MG/DL (ref 75–110)
GLUCOSE SERPL-MCNC: 147 MG/DL (ref 70–99)
HCT VFR BLD AUTO: 29.2 % (ref 40.7–50.3)
HGB BLD-MCNC: 8.9 G/DL (ref 13–17)
IMM GRANULOCYTES # BLD AUTO: 0.05 K/UL (ref 0–0.3)
IMM GRANULOCYTES NFR BLD: 1 %
INR PPP: 2
LYMPHOCYTES NFR BLD: 0.56 K/UL (ref 1–4.8)
LYMPHOCYTES RELATIVE PERCENT: 12 % (ref 24–44)
MCH RBC QN AUTO: 28.6 PG (ref 25.2–33.5)
MCHC RBC AUTO-ENTMCNC: 30.5 G/DL (ref 28.4–34.8)
MCV RBC AUTO: 93.9 FL (ref 82.6–102.9)
MONOCYTES NFR BLD: 0.61 K/UL (ref 0.2–0.8)
MONOCYTES NFR BLD: 13 % (ref 1–7)
NEUTROPHILS NFR BLD: 71 % (ref 36–66)
NEUTS SEG NFR BLD: 3.34 K/UL (ref 1.8–7.7)
NRBC BLD-RTO: 0 PER 100 WBC
PLATELET # BLD AUTO: ABNORMAL K/UL (ref 138–453)
PLATELET, FLUORESCENCE: 39 K/UL (ref 138–453)
PLATELETS.RETICULATED NFR BLD AUTO: 5.8 % (ref 1.1–10.3)
POTASSIUM SERPL-SCNC: 3.3 MMOL/L (ref 3.7–5.3)
POTASSIUM SERPL-SCNC: 3.4 MMOL/L (ref 3.7–5.3)
PROTHROMBIN TIME: 22.8 SEC (ref 11.5–14.2)
RBC # BLD AUTO: 3.11 M/UL (ref 4.21–5.77)
SODIUM SERPL-SCNC: 133 MMOL/L (ref 135–144)
WBC OTHER # BLD: 4.7 K/UL (ref 3.5–11.3)

## 2024-05-07 PROCEDURE — 36415 COLL VENOUS BLD VENIPUNCTURE: CPT

## 2024-05-07 PROCEDURE — 85610 PROTHROMBIN TIME: CPT

## 2024-05-07 PROCEDURE — 1200000000 HC SEMI PRIVATE

## 2024-05-07 PROCEDURE — 6370000000 HC RX 637 (ALT 250 FOR IP): Performed by: INTERNAL MEDICINE

## 2024-05-07 PROCEDURE — 82947 ASSAY GLUCOSE BLOOD QUANT: CPT

## 2024-05-07 PROCEDURE — 85025 COMPLETE CBC W/AUTO DIFF WBC: CPT

## 2024-05-07 PROCEDURE — 97530 THERAPEUTIC ACTIVITIES: CPT

## 2024-05-07 PROCEDURE — 2580000003 HC RX 258: Performed by: HOSPITALIST

## 2024-05-07 PROCEDURE — 83880 ASSAY OF NATRIURETIC PEPTIDE: CPT

## 2024-05-07 PROCEDURE — 80048 BASIC METABOLIC PNL TOTAL CA: CPT

## 2024-05-07 PROCEDURE — 85055 RETICULATED PLATELET ASSAY: CPT

## 2024-05-07 PROCEDURE — 6360000002 HC RX W HCPCS: Performed by: INTERNAL MEDICINE

## 2024-05-07 PROCEDURE — 97116 GAIT TRAINING THERAPY: CPT

## 2024-05-07 PROCEDURE — 97110 THERAPEUTIC EXERCISES: CPT

## 2024-05-07 PROCEDURE — 84132 ASSAY OF SERUM POTASSIUM: CPT

## 2024-05-07 PROCEDURE — 6370000000 HC RX 637 (ALT 250 FOR IP): Performed by: HOSPITALIST

## 2024-05-07 RX ORDER — POTASSIUM CHLORIDE 20 MEQ/1
20 TABLET, EXTENDED RELEASE ORAL ONCE
Status: COMPLETED | OUTPATIENT
Start: 2024-05-07 | End: 2024-05-07

## 2024-05-07 RX ADMIN — SODIUM CHLORIDE, PRESERVATIVE FREE 10 ML: 5 INJECTION INTRAVENOUS at 08:09

## 2024-05-07 RX ADMIN — INSULIN LISPRO 2 UNITS: 100 INJECTION, SOLUTION INTRAVENOUS; SUBCUTANEOUS at 12:22

## 2024-05-07 RX ADMIN — MIDODRINE HYDROCHLORIDE 10 MG: 10 TABLET ORAL at 08:08

## 2024-05-07 RX ADMIN — DOXAZOSIN 4 MG: 4 TABLET ORAL at 21:01

## 2024-05-07 RX ADMIN — POTASSIUM CHLORIDE 20 MEQ: 1500 TABLET, EXTENDED RELEASE ORAL at 04:35

## 2024-05-07 RX ADMIN — POTASSIUM CHLORIDE 20 MEQ: 1500 TABLET, EXTENDED RELEASE ORAL at 06:50

## 2024-05-07 RX ADMIN — MIDODRINE HYDROCHLORIDE 10 MG: 10 TABLET ORAL at 12:17

## 2024-05-07 RX ADMIN — PRAVASTATIN SODIUM 20 MG: 20 TABLET ORAL at 21:00

## 2024-05-07 RX ADMIN — BUMETANIDE 2 MG: 0.25 INJECTION INTRAMUSCULAR; INTRAVENOUS at 18:40

## 2024-05-07 RX ADMIN — BUMETANIDE 2 MG: 0.25 INJECTION INTRAMUSCULAR; INTRAVENOUS at 08:07

## 2024-05-07 RX ADMIN — METOLAZONE 5 MG: 2.5 TABLET ORAL at 08:08

## 2024-05-07 RX ADMIN — MIDODRINE HYDROCHLORIDE 10 MG: 10 TABLET ORAL at 17:06

## 2024-05-07 RX ADMIN — ALLOPURINOL 100 MG: 100 TABLET ORAL at 08:08

## 2024-05-07 RX ADMIN — SODIUM CHLORIDE, PRESERVATIVE FREE 10 ML: 5 INJECTION INTRAVENOUS at 21:03

## 2024-05-07 RX ADMIN — Medication 12.5 MG: at 08:09

## 2024-05-07 NOTE — PROGRESS NOTES
Warfarin Dosing - Pharmacy Consult Note  Consulting Provider: Vicky Galindo MD  Indication:  History of  VTE/PE and Atrial Fibrillation  Warfarin Dose prior to admission: 2mg Mon/Fri, 4mg all other days   Concurrent anticoagulants/antiplatelets: Pletal (on hold)  Significant Drug Interactions: No obvious interactions  Recent Labs     05/05/24  0619 05/06/24  0613 05/07/24  0556   INR 2.2 2.1 2.0   HGB 9.3* 9.0* 8.9*   PLT See Reflexed IPF Result See Reflexed IPF Result See Reflexed IPF Result     Recent warfarin administrations        No warfarin orders with administrations found.            Orders not given:            warfarin placeholder: dosing by pharmacy                   Date   INR    Dose  4/29       3.1        none  4/30       3.3        none  5/1         3.0        2 mg  5/2         2.5        none  5/3         2.8        none  5/4         2.6        none  5/5         2.2        none  5/6         2.0        none    Assessment/Plan  (Goal INR: 2 - 3)  INR therapeutic. Per heme-hold warfarin for platelets below 50. This hold parameter was communicated with cardiology and primary team. Discharge plan is to get repeat CBC, INR 1 day after discharge and [facility].     Active problem list reviewed.  INR orders are placed.  Chart reviewed for pertinent labs, drug/diet interactions, and past doses.  Documentation of patient's clinical condition was reviewed.    Pharmacy Dosing:  Pharmacy will continue to follow.

## 2024-05-07 NOTE — ADT AUTH CERT
Progress Notes by Stephanie Demarco MD at 5/6/2024  5:57 PM    Author: Stephanie Demarco MD Service: Family Medicine Author Type: Physician   Filed: 5/6/2024  8:01 PM Date of Service: 5/6/2024  5:57 PM Status: Signed   : Stephanie Demarco MD (Physician)   Expand All Collapse All    Progress note  [facility]    Adult Hospitalist        Name: Bruno Bustamante  MRN:   [id number]                                    Acct:   [id number]  Room: 2025/2025-01     Admit Date: 4/29/2024  9:48 AM  PCP: Ko Bauer MD     Primary Problem  Principal Problem:    Cellulitis  Active Problems:    Permanent atrial fibrillation (HCC)    Acute on chronic systolic congestive heart failure (HCC)    Bilateral lower leg cellulitis    Pressure ulcer of coccygeal region, stage 3 (HCC)  Resolved Problems:    * No resolved hospital problems. *           Assesment/ plan:      Patient admitted to [facility] floor  Cardiac monitoring  DNR CCA        Bilateral lower extremity stasis dermatitis  Lactate and procalcitonin  Blood culture shows no growth to date  IV cefepime discontinued by ID/off antibiotics  Infectious disease was involved in the care     Stasis dermatitis  Patient has chronic stasis dermatitis  Venous Doppler lower extremity negative for DVT     Acute on chronic systolic diastolic CHF  Monitoring BNP  Patient presented with bilateral lower extremity edema-worsening  IV Bumex 2 mg twice a day  Monitor creatinine  Echocardiogram shows EF 25 to 30%  Patient on doxazosin and Zaroxolyn  Patient not a candidate for Ace/ Arb/AICD  Cardiology following   Nephrology following  Palliative care consult     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

## 2024-05-07 NOTE — PROGRESS NOTES
Occupational Therapy  Facility/Department: [facility]  Rehabilitation Occupational Therapy Daily Treatment Note    Date: 24  Patient Name: Bruno Bustamante       Room:   MRN: [id number]  Account: [id number]   : 1930  (89 y.o.) Gender: male      RN reports patient is medically stable for therapy treatment this date. Chart reviewed prior to treatment and patient is agreeable for therapy.  All lines intact and patient positioned comfortably at end of treatment.  All patient needs addressed prior to ending therapy session.      Pt currently functioning below baseline.  Recommend daily inpatient skilled therapy at time of discharge to maximize long term outcomes and prevent re-admission. Please refer to AM-PAC score for current level of function.               Past Medical History:  has a past medical history of Achilles tendon pain, Anemia, Atrial fibrillation (HCC), Cardiomyopathy (HCC), Cardiomyopathy (HCC), Cataract, CHF (congestive heart failure) (HCC), Chronic kidney disease, COPD (chronic obstructive pulmonary disease) (HCC), Dental disease, Diabetes mellitus (HCC), Diabetic neuropathy (HCC), DVT (deep venous thrombosis) (HCC), Edema, Entropion, Gout, Hearing loss, Hyperlipidemia, Hypertension, and Ribs, multiple fractures.  Past Surgical History:   has a past surgical history that includes hernia repair (); lipoma resection (); Colonoscopy (); Achilles tendon surgery (Left, 's); Tonsillectomy (s); and Eye surgery (Right, 2016).    Restrictions  Restrictions/Precautions: General Precautions, Fall Risk, Up as Tolerated  Other position/activity restrictions: DARIA LITTLE, telemetry, B knee high evita hose  Required Braces or Orthoses?: No    Subjective  Subjective: Pt sitting up in recliner upon arrival agreeable to OT.  Restrictions/Precautions: General Precautions;Fall Risk;Up as Tolerated             Objective     Cognition  Overall Cognitive Status:

## 2024-05-07 NOTE — PROGRESS NOTES
Physical Therapy  Facility/Department: [facility]  Daily Treatment Note  NAME: Bruno Bustamante  : 1930  MRN: [id number]    Date of Service: 2024    Discharge Recommendations:  Patient would benefit from continued therapy after discharge    Pt currently functioning below baseline.  Recommend daily inpatient skilled therapy at time of discharge to maximize long term outcomes and prevent re-admission. Please refer to AM-PAC score for current level of function.     Patient Diagnosis(es): The primary encounter diagnosis was Bilateral lower leg cellulitis. Diagnoses of Atrial fibrillation, chronic (HCC) and Acute on chronic systolic CHF (congestive heart failure) (HCC) were also pertinent to this visit.    Assessment   Assessment: Patient below baseline with decreased endurance and limited activity tolerance and difficulties with bed mobility today. Patient would benefit from continued skilled PT services to address conditioning, overall strengthen, endurance with increased activity, bed mobility technique to increased independence.   Activity Tolerance: Patient tolerated treatment well;Patient limited by endurance     Plan    Physical Therapy Plan  General Plan: 5-7 times per week  Current Treatment Recommendations: Strengthening;ROM;Balance training;Functional mobility training;Transfer training;Gait training;Neuromuscular re-education;Home exercise program;Endurance training;Patient/Caregiver education & training;Safety education & training;Therapeutic activities;Equipment evaluation, education, & procurement     Restrictions  Restrictions/Precautions  Restrictions/Precautions: General Precautions, Fall Risk, Up as Tolerated  Required Braces or Orthoses?: No  Position Activity Restriction  Other position/activity restrictions: RUE IV, telemetry, B knee high evita hose     Subjective    Subjective  Subjective: Patient in restroom upon writer's arrival and agreeable for PT Treatment. ROSA Box reported patient

## 2024-05-07 NOTE — PROGRESS NOTES
Nephrology Progress Note        Subjective: The patient is a stable, he denies pain, no shortness of breath, no nausea, vomiting or diarrhea, blood pressure is low, afebrile, weight is up, no strict I's and O's    Objective:  CURRENT TEMPERATURE:  Temp: 97.3 °F (36.3 °C)  MAXIMUM TEMPERATURE OVER 24HRS:  Temp (24hrs), Av.5 °F (36.4 °C), Min:97.2 °F (36.2 °C), Max:97.7 °F (36.5 °C)    CURRENT RESPIRATORY RATE:  Respirations: 16  CURRENT PULSE:  Pulse: 89  CURRENT BLOOD PRESSURE:  BP: 93/61  24HR BLOOD PRESSURE RANGE:  Systolic (24hrs), Av , Min:85 , Max:106   ; Diastolic (24hrs), Av, Min:51, Max:61    24HR INTAKE/OUTPUT:    Intake/Output Summary (Last 24 hours) at 2024 1020  Last data filed at 2024 0653  Gross per 24 hour   Intake 380 ml   Output 2 ml   Net 378 ml     Weight   Wt Readings from Last 3 Encounters:   24 84.3 kg (185 lb 12.8 oz)   10/05/23 79.9 kg (176 lb 3.2 oz)   23 83.9 kg (185 lb)       Physical Exam:  Awake, alert, in no acute distress  Skin: warm and dry, no rash or erythema  Eyes: conjunctivae normal and sclera anicteric  Pulmonary: clear to auscultation bilaterally- no wheezes, rales or rhonchi, normal air movement, no respiratory distress  Cardiovascular: Normal S1 & S2  Abdomen: soft nontender, bowel sounds present, no organomegaly,  no ascites  Extremities: 3 + edema-  bilateral     Current Medications:    epoetin calos (EPOGEN;PROCRIT) injection 4,000 Units, Once per day on   albumin human 5% IV solution 25 g, Q6H PRN  bumetanide (BUMEX) injection 2 mg, BID  metOLazone (ZAROXOLYN) tablet 5 mg, Daily  melatonin tablet 6 mg, Nightly PRN  midodrine (PROAMATINE) tablet 10 mg, TID WC  carvedilol (COREG) tablet 3.125 mg, BID WC  allopurinol (ZYLOPRIM) tablet 100 mg, Daily  [Held by provider] cilostazol (PLETAL) tablet 100 mg, Daily  doxazosin (CARDURA) tablet 4 mg, Nightly  isosorbide mononitrate (IMDUR) extended release tablet 30 mg, Daily  pravastatin

## 2024-05-07 NOTE — PROGRESS NOTES
Cielo Best NP called. Ok to stop aranesp and procrit while at rehab since patient received 5/7. [facility] with [facility] upon discharge from [facility]

## 2024-05-08 VITALS
HEIGHT: 70 IN | TEMPERATURE: 97.7 F | OXYGEN SATURATION: 100 % | SYSTOLIC BLOOD PRESSURE: 104 MMHG | BODY MASS INDEX: 26.05 KG/M2 | DIASTOLIC BLOOD PRESSURE: 66 MMHG | RESPIRATION RATE: 16 BRPM | HEART RATE: 98 BPM | WEIGHT: 182 LBS

## 2024-05-08 LAB
ANION GAP SERPL CALCULATED.3IONS-SCNC: 16 MMOL/L (ref 9–17)
BASOPHILS # BLD: 0.05 K/UL (ref 0–0.2)
BASOPHILS NFR BLD: 1 % (ref 0–2)
BNP SERPL-MCNC: ABNORMAL PG/ML
BUN SERPL-MCNC: 62 MG/DL (ref 8–23)
BUN/CREAT SERPL: 28 (ref 9–20)
CALCIUM SERPL-MCNC: 8.6 MG/DL (ref 8.6–10.4)
CHLORIDE SERPL-SCNC: 96 MMOL/L (ref 98–107)
CO2 SERPL-SCNC: 24 MMOL/L (ref 20–31)
CREAT SERPL-MCNC: 2.2 MG/DL (ref 0.7–1.2)
EOSINOPHIL # BLD: 0.1 K/UL (ref 0–0.44)
EOSINOPHILS RELATIVE PERCENT: 2 % (ref 1–4)
ERYTHROCYTE [DISTWIDTH] IN BLOOD BY AUTOMATED COUNT: 17.3 % (ref 11.8–14.4)
GFR, ESTIMATED: 27 ML/MIN/1.73M2
GLUCOSE BLD-MCNC: 130 MG/DL (ref 75–110)
GLUCOSE BLD-MCNC: 228 MG/DL (ref 75–110)
GLUCOSE SERPL-MCNC: 121 MG/DL (ref 70–99)
HCT VFR BLD AUTO: 28.5 % (ref 40.7–50.3)
HGB BLD-MCNC: 9 G/DL (ref 13–17)
IMM GRANULOCYTES # BLD AUTO: 0.05 K/UL (ref 0–0.3)
IMM GRANULOCYTES NFR BLD: 1 %
INR PPP: 1.8
LYMPHOCYTES NFR BLD: 0.65 K/UL (ref 1.1–3.7)
LYMPHOCYTES RELATIVE PERCENT: 13 % (ref 24–43)
MAGNESIUM SERPL-MCNC: 2.3 MG/DL (ref 1.6–2.6)
MCH RBC QN AUTO: 29.3 PG (ref 25.2–33.5)
MCHC RBC AUTO-ENTMCNC: 31.6 G/DL (ref 28.4–34.8)
MCV RBC AUTO: 92.8 FL (ref 82.6–102.9)
MONOCYTES NFR BLD: 0.55 K/UL (ref 0.1–1.2)
MONOCYTES NFR BLD: 11 % (ref 3–12)
NEUTROPHILS NFR BLD: 72 % (ref 36–65)
NEUTS SEG NFR BLD: 3.6 K/UL (ref 1.5–8.1)
NRBC BLD-RTO: 0 PER 100 WBC
PLATELET # BLD AUTO: 51 K/UL (ref 138–453)
PMV BLD AUTO: 12.1 FL (ref 8.1–13.5)
POTASSIUM SERPL-SCNC: 3.3 MMOL/L (ref 3.7–5.3)
PROTHROMBIN TIME: 21.2 SEC (ref 11.5–14.2)
RBC # BLD AUTO: 3.07 M/UL (ref 4.21–5.77)
SODIUM SERPL-SCNC: 136 MMOL/L (ref 135–144)
WBC OTHER # BLD: 5 K/UL (ref 3.5–11.3)

## 2024-05-08 PROCEDURE — 6370000000 HC RX 637 (ALT 250 FOR IP): Performed by: FAMILY MEDICINE

## 2024-05-08 PROCEDURE — 6370000000 HC RX 637 (ALT 250 FOR IP): Performed by: INTERNAL MEDICINE

## 2024-05-08 PROCEDURE — 83735 ASSAY OF MAGNESIUM: CPT

## 2024-05-08 PROCEDURE — 85025 COMPLETE CBC W/AUTO DIFF WBC: CPT

## 2024-05-08 PROCEDURE — 83880 ASSAY OF NATRIURETIC PEPTIDE: CPT

## 2024-05-08 PROCEDURE — 2580000003 HC RX 258: Performed by: HOSPITALIST

## 2024-05-08 PROCEDURE — 85610 PROTHROMBIN TIME: CPT

## 2024-05-08 PROCEDURE — 82947 ASSAY GLUCOSE BLOOD QUANT: CPT

## 2024-05-08 PROCEDURE — 80048 BASIC METABOLIC PNL TOTAL CA: CPT

## 2024-05-08 PROCEDURE — 6370000000 HC RX 637 (ALT 250 FOR IP): Performed by: HOSPITALIST

## 2024-05-08 PROCEDURE — 36415 COLL VENOUS BLD VENIPUNCTURE: CPT

## 2024-05-08 RX ORDER — BUMETANIDE 2 MG/1
2 TABLET ORAL 2 TIMES DAILY
Qty: 30 TABLET | Refills: 3 | Status: SHIPPED | OUTPATIENT
Start: 2024-05-08

## 2024-05-08 RX ORDER — SPIRONOLACTONE 25 MG/1
12.5 TABLET ORAL DAILY
Qty: 30 TABLET | Refills: 3 | Status: SHIPPED | OUTPATIENT
Start: 2024-05-09

## 2024-05-08 RX ORDER — METOLAZONE 5 MG/1
5 TABLET ORAL DAILY
Qty: 30 TABLET | Refills: 3 | Status: SHIPPED | OUTPATIENT
Start: 2024-05-09

## 2024-05-08 RX ORDER — BUMETANIDE 1 MG/1
2 TABLET ORAL 2 TIMES DAILY
Status: DISCONTINUED | OUTPATIENT
Start: 2024-05-08 | End: 2024-05-08 | Stop reason: HOSPADM

## 2024-05-08 RX ORDER — POTASSIUM CHLORIDE 20 MEQ/1
20 TABLET, EXTENDED RELEASE ORAL ONCE
Status: COMPLETED | OUTPATIENT
Start: 2024-05-08 | End: 2024-05-08

## 2024-05-08 RX ORDER — MIDODRINE HYDROCHLORIDE 5 MG/1
10 TABLET ORAL
Qty: 90 TABLET | Refills: 3 | Status: SHIPPED | OUTPATIENT
Start: 2024-05-08

## 2024-05-08 RX ORDER — DOXAZOSIN MESYLATE 4 MG/1
4 TABLET ORAL NIGHTLY
Qty: 30 TABLET | Refills: 3 | Status: SHIPPED | OUTPATIENT
Start: 2024-05-08

## 2024-05-08 RX ORDER — CARVEDILOL 3.12 MG/1
3.12 TABLET ORAL 2 TIMES DAILY WITH MEALS
Qty: 60 TABLET | Refills: 3 | Status: SHIPPED | OUTPATIENT
Start: 2024-05-08

## 2024-05-08 RX ORDER — ISOSORBIDE MONONITRATE 30 MG/1
30 TABLET, EXTENDED RELEASE ORAL DAILY
Qty: 30 TABLET | Refills: 3 | Status: SHIPPED | OUTPATIENT
Start: 2024-05-09

## 2024-05-08 RX ADMIN — POTASSIUM CHLORIDE 20 MEQ: 1500 TABLET, EXTENDED RELEASE ORAL at 12:07

## 2024-05-08 RX ADMIN — SODIUM CHLORIDE, PRESERVATIVE FREE 10 ML: 5 INJECTION INTRAVENOUS at 09:20

## 2024-05-08 RX ADMIN — METOLAZONE 5 MG: 2.5 TABLET ORAL at 09:21

## 2024-05-08 RX ADMIN — BUMETANIDE 2 MG: 1 TABLET ORAL at 12:08

## 2024-05-08 RX ADMIN — INSULIN LISPRO 2 UNITS: 100 INJECTION, SOLUTION INTRAVENOUS; SUBCUTANEOUS at 12:07

## 2024-05-08 RX ADMIN — MIDODRINE HYDROCHLORIDE 10 MG: 10 TABLET ORAL at 09:22

## 2024-05-08 RX ADMIN — ALLOPURINOL 100 MG: 100 TABLET ORAL at 09:22

## 2024-05-08 RX ADMIN — MIDODRINE HYDROCHLORIDE 10 MG: 10 TABLET ORAL at 12:07

## 2024-05-08 RX ADMIN — Medication 12.5 MG: at 09:21

## 2024-05-08 NOTE — CARE COORDINATION
Social work: Patient to dc to [facility] via [facility]  at 2:45PM.  # for RN report: [phone]. Completed GINGER faxed to [phone].  Informed RN, pt, and facility of dc time, agreeable to plan.   HENS completed.

## 2024-05-08 NOTE — PROGRESS NOTES
Pt discharged to [facility] in stable condition with belongings  Discharge instructions given  Pt denies having any further questions at this time  Locked up home medication(s)/personal items given to patient at discharge  Patient/family state they have everything they were admitted with.

## 2024-05-08 NOTE — PROGRESS NOTES
Nephrology Progress Note        Subjective: The patient is a stable, he denies pain, no shortness of breath, no nausea, vomiting or diarrhea, blood pressure is low, afebrile, weight is down, no strict I's and O's since the patient is incontinent    Objective:  CURRENT TEMPERATURE:  Temp: 98.1 °F (36.7 °C)  MAXIMUM TEMPERATURE OVER 24HRS:  Temp (24hrs), Av.6 °F (36.4 °C), Min:97.2 °F (36.2 °C), Max:98.1 °F (36.7 °C)    CURRENT RESPIRATORY RATE:  Respirations: 16  CURRENT PULSE:  Pulse: 80  CURRENT BLOOD PRESSURE:  BP: 97/63  24HR BLOOD PRESSURE RANGE:  Systolic (24hrs), Av , Min:91 , Max:105   ; Diastolic (24hrs), Av, Min:45, Max:73    24HR INTAKE/OUTPUT:    Intake/Output Summary (Last 24 hours) at 2024 0923  Last data filed at 2024 1758  Gross per 24 hour   Intake 250 ml   Output --   Net 250 ml       Weight   Wt Readings from Last 3 Encounters:   24 82.6 kg (182 lb)   10/05/23 79.9 kg (176 lb 3.2 oz)   23 83.9 kg (185 lb)       Physical Exam:  Awake, alert, in no acute distress  Skin: warm and dry, no rash or erythema  Eyes: conjunctivae normal and sclera anicteric  Pulmonary: clear to auscultation bilaterally- no wheezes, rales or rhonchi, normal air movement, no respiratory distress  Cardiovascular: Normal S1 & S2  Abdomen: soft nontender, bowel sounds present, no organomegaly,  no ascites  Extremities: 3 + edema-  bilateral     Current Medications:    bumetanide (BUMEX) tablet 2 mg, BID  epoetin calos (EPOGEN;PROCRIT) injection 4,000 Units, Once per day on   albumin human 5% IV solution 25 g, Q6H PRN  metOLazone (ZAROXOLYN) tablet 5 mg, Daily  melatonin tablet 6 mg, Nightly PRN  midodrine (PROAMATINE) tablet 10 mg, TID WC  carvedilol (COREG) tablet 3.125 mg, BID WC  allopurinol (ZYLOPRIM) tablet 100 mg, Daily  [Held by provider] cilostazol (PLETAL) tablet 100 mg, Daily  doxazosin (CARDURA) tablet 4 mg, Nightly  isosorbide mononitrate (IMDUR) extended release tablet 30

## 2024-05-08 NOTE — CARE COORDINATION
Social work: spoke to [facility]/Timothy Laura, authorization rec'd for admission.   Await dc order.   BARRY started.

## 2024-05-08 NOTE — PROGRESS NOTES
Progress note  [facility].,    Adult Hospitalist      Name: Bruno Bustamante  MRN: [id number]     Acct: [id number]  Room: 2025/2025-01    Admit Date: 4/29/2024  9:48 AM  PCP: Ko Bauer MD    Primary Problem  Principal Problem:    Cellulitis  Active Problems:    Permanent atrial fibrillation (HCC)    Acute on chronic systolic congestive heart failure (HCC)    Bilateral lower leg cellulitis    Pressure ulcer of coccygeal region, stage 3 (HCC)  Resolved Problems:    * No resolved hospital problems. *        Assesment/ plan:     Patient admitted to [facility] floor  Cardiac monitoring  DNR CCA      Bilateral lower extremity stasis dermatitis  Lactate and procalcitonin  Blood culture shows no growth to date  IV cefepime discontinued by ID/off antibiotics  Infectious disease was involved in the care    Stasis dermatitis  Patient has chronic stasis dermatitis  Venous Doppler lower extremity negative for DVT    Acute on chronic systolic diastolic CHF  Monitoring BNP  Patient presented with bilateral lower extremity edema-worsening  IV Bumex 2 mg twice a day  Monitor creatinine  Echocardiogram shows EF 25 to 30%  Patient on doxazosin and Zaroxolyn  Patient not a candidate for Ace/ Arb/AICD  Cardiology following   Nephrology following  Palliative care consult    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  IV iron  Decision of anticoagulation as per cardiology and recommendation of continuation of Coumadin as per cardiology      Chronic anemia  At baseline    Hypertensive heart disease  Continue carvedilol     Mixed hyperlipidemia  Continue pravastatin    Diabetes type  23.1 (H) 05/06/2024       Lab Results   Component Value Date/Time    SPECIAL LFA 12ML 04/29/2024 10:19 AM     Lab Results   Component Value Date/Time    CULTURE NO GROWTH 5 DAYS 04/29/2024 10:19 AM       No results found for: \"POCPH\", \"PHART\", \"PH\", \"POCPCO2\", \"IDQ3KPW\", \"PCO2\", \"POCPO2\", \"PO2ART\", \"PO2\", \"POCHCO3\", \"YUA8HQP\", \"HCO3\", \"NBEA\", \"PBEA\", \"BEART\", \"BE\", \"THGBART\", \"THB\", \"UGX7PHO\", \"BBPN6OJG\", \"P3YUJGVC\", \"O2SAT\", \"FIO2\"    Radiology:    XR CHEST PORTABLE    Result Date: 4/29/2024  Cardiomegaly with bilateral effusions and scattered infiltrates.         All radiological studies reviewed                Code Status:  DNR-CCA    Electronically signed by Stephanie Demarco MD on 5/8/2024 at 9:01 AM     Copy sent to Ko Santos MD    This note was created with the assistance of a speech-recognition program.  Although the intention is to generate a document that actually reflects the content of the visit, no guarantees can be provided that every mistake has been identified and corrected by editing.     Note was updated later by me after  physical examination and  completion of the assessment.

## 2024-05-08 NOTE — PROGRESS NOTES
Progress note  [facility].,    Adult Hospitalist      Name: Bruno Bustamante  MRN: [id number]     Acct: [id number]  Room: 2025/2025-01    Admit Date: 4/29/2024  9:48 AM  PCP: Ko Bauer MD    Primary Problem  Principal Problem:    Cellulitis  Active Problems:    Permanent atrial fibrillation (HCC)    Acute on chronic systolic congestive heart failure (HCC)    Bilateral lower leg cellulitis    Pressure ulcer of coccygeal region, stage 3 (HCC)  Resolved Problems:    * No resolved hospital problems. *        Assesment/ plan:     Patient admitted to [facility] floor  Cardiac monitoring  DNR CCA      Bilateral lower extremity stasis dermatitis  Lactate and procalcitonin  Blood culture shows no growth to date  IV cefepime discontinued by ID/off antibiotics  Infectious disease was involved in the care    Stasis dermatitis  Patient has chronic stasis dermatitis  Venous Doppler lower extremity negative for DVT    Acute on chronic systolic diastolic CHF  Monitoring BNP  Patient presented with bilateral lower extremity edema-worsening  IV Bumex 2 mg twice a day  Monitor creatinine  Echocardiogram shows EF 25 to 30%  Patient on doxazosin and Zaroxolyn  Patient not a candidate for Ace/ Arb/AICD  Cardiology following   Nephrology following  Palliative care consult    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  IV iron  Decision of anticoagulation as per cardiology and recommendation of continuation of Coumadin as per cardiology      Chronic anemia  At baseline    Hypertensive heart disease  Continue carvedilol     Mixed hyperlipidemia  Continue pravastatin    Diabetes type  who presents with Leg Swelling    89-year-old gentleman presented to ER due to worsening peripheral edema and shortness of breath.  Past medical history significant for CHF, stasis dermatitis.  Family also reported that they have noticed worsening erythema and swelling.  Patient denies any fever, chills, cough, cold, changes in urination, bowel habit.    Patient admitted for further management      I have personally reviewed the past medical history, past surgical history, medications, social history, and family history, and summarized in the note.    Review of Systems:     All 10 point system is reviewed and negative otherwise mentioned in HPI.      Past Medical History:     Past Medical History:   Diagnosis Date    Achilles tendon pain 1980's    Repair, Had DVT post surgery    Anemia     Atrial fibrillation (HCC)     Cardiomyopathy (HCC)     Cardiomyopathy (HCC)     Cataract     CHF (congestive heart failure) (HCC)     Chronic kidney disease     Stage 3    COPD (chronic obstructive pulmonary disease) (HCC)     Dental disease     Diabetes mellitus (HCC)     Type 2     Diabetic neuropathy (HCC)     Bilateral Lower legs distal to knees    DVT (deep venous thrombosis) ([facility]) 1980's    With PE     Edema     Bilateral Legs    Entropion 06/2016    Right Lower Lid    Gout     Hearing loss     Hyperlipidemia     Hypertension     Ribs, multiple fractures     Per [facility] records        Past Surgical History:     Past Surgical History:   Procedure Laterality Date    ACHILLES TENDON SURGERY Left 1980's    Had DVT post surgery    COLONOSCOPY  1990's    EYE SURGERY Right 07/12/2016    Repair Entropion eye, Rt lower lid. Surgeon Elias Redman at [facility]    HERNIA REPAIR  1980     2 Lt inguinal    LIPOMA RESECTION  1980's    TONSILLECTOMY  1940's        Medications Prior to Admission:       Prior to Admission medications    Medication Sig Start Date End Date Taking? Authorizing Provider   spironolactone

## 2024-05-08 NOTE — PLAN OF CARE
Problem: ABCDS Injury Assessment  Goal: Absence of physical injury  5/4/2024 2222 by Rupa Dubois, RN  Outcome: Progressing  Flowsheets (Taken 5/4/2024 2222)  Absence of Physical Injury: Implement safety measures based on patient assessment     
  Problem: Discharge Planning  Goal: Discharge to home or other facility with appropriate resources  4/30/2024 0158 by Taras Gorman RN  Outcome: Progressing  Flowsheets (Taken 4/29/2024 2000)  Discharge to home or other facility with appropriate resources:   Identify barriers to discharge with patient and caregiver   Arrange for needed discharge resources and transportation as appropriate   Identify discharge learning needs (meds, wound care, etc)   Refer to discharge planning if patient needs post-hospital services based on physician order or complex needs related to functional status, cognitive ability or social support system  4/29/2024 1851 by Sophia Darby, RN  Outcome: Progressing  Flowsheets  Taken 4/29/2024 1651  Discharge to home or other facility with appropriate resources: Identify barriers to discharge with patient and caregiver  Taken 4/29/2024 1620  Discharge to home or other facility with appropriate resources:   Identify barriers to discharge with patient and caregiver   Arrange for needed discharge resources and transportation as appropriate   Identify discharge learning needs (meds, wound care, etc)     Problem: Safety - Adult  Goal: Free from fall injury  4/30/2024 0158 by Taras Gorman RN  Outcome: Progressing  4/29/2024 1851 by Sophia Darby RN  Outcome: Progressing     Problem: ABCDS Injury Assessment  Goal: Absence of physical injury  4/30/2024 0158 by Taras Gorman RN  Outcome: Progressing  4/29/2024 1851 by Sophia Darby, RN  Outcome: Progressing     
  Problem: Discharge Planning  Goal: Discharge to home or other facility with appropriate resources  5/1/2024 2343 by Charlette Aguilera RN  Outcome: Progressing  5/1/2024 1815 by Gerber Alfaro RN  Outcome: Progressing     Problem: Safety - Adult  Goal: Free from fall injury  5/1/2024 2343 by Charlette Aguilera RN  Outcome: Progressing  5/1/2024 1815 by Gerber Alfaro RN  Outcome: Progressing     Problem: ABCDS Injury Assessment  Goal: Absence of physical injury  5/1/2024 2343 by Charlette Aguliera RN  Outcome: Progressing  5/1/2024 1815 by Gerber Alfaro RN  Outcome: Progressing     Problem: Respiratory - Adult  Goal: Achieves optimal ventilation and oxygenation  5/1/2024 2343 by Charlette Aguilera RN  Outcome: Progressing  5/1/2024 1815 by Gerber Alfaro RN  Reactivated     Problem: Skin/Tissue Integrity - Adult  Goal: Skin integrity remains intact  5/1/2024 2343 by Charlette Aguilera RN  Outcome: Progressing  5/1/2024 1815 by Gerber Alfaro RN  Reactivated  Goal: Incisions, wounds, or drain sites healing without S/S of infection  5/1/2024 2343 by Charlette Aguilera RN  Outcome: Progressing  5/1/2024 1815 by Gerber Alfaro RN  Reactivated     Problem: Musculoskeletal - Adult  Goal: Return mobility to safest level of function  5/1/2024 2343 by Charlette Aguilera RN  Outcome: Progressing  5/1/2024 1815 by Gerber Alfaro RN  Reactivated     Problem: Metabolic/Fluid and Electrolytes - Adult  Goal: Electrolytes maintained within normal limits  5/1/2024 2343 by Charlette Aguilera RN  Outcome: Progressing  5/1/2024 1815 by Gerber Alfaro RN  Reactivated     
  Problem: Discharge Planning  Goal: Discharge to home or other facility with appropriate resources  5/2/2024 0939 by Karl Martin, RN  Outcome: Progressing  Flowsheets (Taken 5/2/2024 0939)  Discharge to home or other facility with appropriate resources: Identify barriers to discharge with patient and caregiver     Problem: Safety - Adult  Goal: Free from fall injury  5/2/2024 0939 by Karl Martin, RN  Outcome: Progressing  Flowsheets (Taken 5/2/2024 0939)  Free From Fall Injury: Instruct family/caregiver on patient safety     Problem: ABCDS Injury Assessment  Goal: Absence of physical injury  5/2/2024 0939 by Karl Martin, RN  Outcome: Progressing  Flowsheets (Taken 5/2/2024 0939)  Absence of Physical Injury: Implement safety measures based on patient assessment     Problem: Respiratory - Adult  Goal: Achieves optimal ventilation and oxygenation  5/2/2024 0939 by Karl Martin, RN  Outcome: Progressing  Flowsheets (Taken 5/2/2024 0939)  Achieves optimal ventilation and oxygenation: Assess for changes in respiratory status     Problem: Skin/Tissue Integrity - Adult  Goal: Skin integrity remains intact  5/2/2024 0939 by Karl Martin, RN  Outcome: Progressing  Flowsheets (Taken 5/2/2024 0939)  Skin Integrity Remains Intact: Monitor for areas of redness and/or skin breakdown     
  Problem: Discharge Planning  Goal: Discharge to home or other facility with appropriate resources  5/3/2024 1431 by Xiomara Gutiérrez RN  Outcome: Progressing  Flowsheets (Taken 5/3/2024 0800)  Discharge to home or other facility with appropriate resources:   Identify barriers to discharge with patient and caregiver   Arrange for needed discharge resources and transportation as appropriate   Identify discharge learning needs (meds, wound care, etc)   Refer to discharge planning if patient needs post-hospital services based on physician order or complex needs related to functional status, cognitive ability or social support system     Problem: Safety - Adult  Goal: Free from fall injury  5/3/2024 1431 by Xiomara Gutiérrez RN  Outcome: Progressing     Problem: ABCDS Injury Assessment  Goal: Absence of physical injury  5/3/2024 1431 by Xiomara Gutiérrez RN  Outcome: Progressing     Problem: Respiratory - Adult  Goal: Achieves optimal ventilation and oxygenation  5/3/2024 1431 by Xiomara Gutiérrez RN  Outcome: Progressing  Flowsheets (Taken 5/3/2024 0800)  Achieves optimal ventilation and oxygenation:   Assess for changes in respiratory status   Assess for changes in mentation and behavior   Position to facilitate oxygenation and minimize respiratory effort   Assess and instruct to report shortness of breath or any respiratory difficulty   Assess the need for suctioning and aspirate as needed   Respiratory therapy support as indicated     Problem: Skin/Tissue Integrity - Adult  Goal: Skin integrity remains intact  5/3/2024 1431 by Xiomara Gutiérrez RN  Outcome: Progressing  Flowsheets  Taken 5/3/2024 0800 by Xiomara Gutiérrez RN  Skin Integrity Remains Intact:   Monitor for areas of redness and/or skin breakdown   Assess vascular access sites hourly   Every 4-6 hours minimum: Change oxygen saturation probe site  Taken 5/3/2024 0140 by Charlette Aguilera, RN  Skin Integrity Remains Intact: Monitor for areas of redness and/or skin 
  Problem: Discharge Planning  Goal: Discharge to home or other facility with appropriate resources  5/4/2024 0407 by Mich Lucio RN  Outcome: Progressing  Note: Patient will return to assisted living facility      Problem: Safety - Adult  Goal: Free from fall injury  5/4/2024 0407 by Mich Lucio, RN  Outcome: Progressing  Note: Patient is free from injury uses call light appropriately and expresses needs       Problem: ABCDS Injury Assessment  Goal: Absence of physical injury  5/4/2024 0407 by Mich Lucio RN  Outcome: Progressing  Note: Patient is free from injury this shift      Problem: Skin/Tissue Integrity - Adult  Goal: Skin integrity remains intact  5/4/2024 0407 by Mich Lucio RN  Outcome: Progressing  Note: No new skin issues      Problem: Nutrition Deficit:  Goal: Optimize nutritional status  5/4/2024 0407 by Mich Lucio, RN  Outcome: Progressing  Note: Patient eats minimally has a lack of appetite      
  Problem: Discharge Planning  Goal: Discharge to home or other facility with appropriate resources  5/7/2024 0101 by Kristyn Ferrara RN  Outcome: Progressing     Problem: Safety - Adult  Goal: Free from fall injury  5/7/2024 0101 by Kristyn Ferrara RN  Outcome: Progressing     Problem: ABCDS Injury Assessment  Goal: Absence of physical injury  5/7/2024 0101 by Kristyn Ferrara RN  Outcome: Progressing     Problem: Respiratory - Adult  Goal: Achieves optimal ventilation and oxygenation  5/7/2024 0101 by Kristyn Ferrara, RN  Outcome: Progressing     
  Problem: Discharge Planning  Goal: Discharge to home or other facility with appropriate resources  Outcome: Progressing     Problem: Safety - Adult  Goal: Free from fall injury  Outcome: Progressing     Problem: ABCDS Injury Assessment  Goal: Absence of physical injury  Outcome: Progressing     Problem: Respiratory - Adult  Goal: Achieves optimal ventilation and oxygenation  Outcome: Progressing     Problem: Skin/Tissue Integrity - Adult  Goal: Skin integrity remains intact  Outcome: Progressing  Goal: Incisions, wounds, or drain sites healing without S/S of infection  Outcome: Progressing     Problem: Musculoskeletal - Adult  Goal: Return mobility to safest level of function  Outcome: Progressing     Problem: Metabolic/Fluid and Electrolytes - Adult  Goal: Electrolytes maintained within normal limits  Outcome: Progressing     Problem: Nutrition Deficit:  Goal: Optimize nutritional status  Outcome: Progressing     
  Problem: Discharge Planning  Goal: Discharge to home or other facility with appropriate resources  Outcome: Progressing  Flowsheets  Taken 4/29/2024 1651  Discharge to home or other facility with appropriate resources: Identify barriers to discharge with patient and caregiver  Taken 4/29/2024 1620  Discharge to home or other facility with appropriate resources:   Identify barriers to discharge with patient and caregiver   Arrange for needed discharge resources and transportation as appropriate   Identify discharge learning needs (meds, wound care, etc)     Problem: Safety - Adult  Goal: Free from fall injury  Outcome: Progressing     
  Problem: Discharge Planning  Goal: Discharge to home or other facility with appropriate resources  Outcome: Progressing  Flowsheets (Taken 5/7/2024 0930)  Discharge to home or other facility with appropriate resources:   Identify barriers to discharge with patient and caregiver   Arrange for needed discharge resources and transportation as appropriate   Identify discharge learning needs (meds, wound care, etc)     Problem: Safety - Adult  Goal: Free from fall injury  Outcome: Progressing     Problem: Respiratory - Adult  Goal: Achieves optimal ventilation and oxygenation  Outcome: Progressing  Flowsheets (Taken 5/7/2024 0930)  Achieves optimal ventilation and oxygenation:   Assess for changes in respiratory status   Assess for changes in mentation and behavior   Position to facilitate oxygenation and minimize respiratory effort     
  Problem: Safety - Adult  Goal: Free from fall injury  5/5/2024 2153 by Rupa Dubois, RN  Outcome: Progressing  Flowsheets (Taken 5/5/2024 2153)  Free From Fall Injury:   Instruct family/caregiver on patient safety   Based on caregiver fall risk screen, instruct family/caregiver to ask for assistance with transferring infant if caregiver noted to have fall risk factors     
AOx4. Still receiving IV diuretics (nephrology to manage). Coumadin and pletal on hold. Need decision to continue or not on discharge risks vs rewards. Afib on monitor rate controlled. BP's 90's to low 100's Systolic. Swelling improving. Patient still weak needs pt/ot reeval for plans on discharge. Palliative consult placed for goals of care.  Problem: Discharge Planning  Goal: Discharge to home or other facility with appropriate resources  Outcome: Progressing     Problem: Safety - Adult  Goal: Free from fall injury  Outcome: Progressing     Problem: ABCDS Injury Assessment  Goal: Absence of physical injury  Outcome: Progressing     Problem: Respiratory - Adult  Goal: Achieves optimal ventilation and oxygenation  Outcome: Progressing     Problem: Skin/Tissue Integrity - Adult  Goal: Skin integrity remains intact  Outcome: Progressing  Goal: Incisions, wounds, or drain sites healing without S/S of infection  Outcome: Progressing     Problem: Musculoskeletal - Adult  Goal: Return mobility to safest level of function  Outcome: Progressing     Problem: Metabolic/Fluid and Electrolytes - Adult  Goal: Electrolytes maintained within normal limits  Outcome: Progressing     Problem: Nutrition Deficit:  Goal: Optimize nutritional status  Outcome: Progressing     Problem: Chronic Conditions and Co-morbidities  Goal: Patient's chronic conditions and co-morbidity symptoms are monitored and maintained or improved  Outcome: Progressing     Problem: Skin/Tissue Integrity  Goal: Absence of new skin breakdown  Description: 1.  Monitor for areas of redness and/or skin breakdown  2.  Assess vascular access sites hourly  3.  Every 4-6 hours minimum:  Change oxygen saturation probe site  4.  Every 4-6 hours:  If on nasal continuous positive airway pressure, respiratory therapy assess nares and determine need for appliance change or resting period.  Outcome: Progressing     
AOx4. Up with walker standby assistance. Dyspnea with exertion. Receiving midodrine tid for lower BP and IV diuresis bumex 2mg bid for CHF exac.. Legs ACE wrapped. Small wound on coccyx with mepilex in place.  Problem: Discharge Planning  Goal: Discharge to home or other facility with appropriate resources  5/4/2024 1757 by Gerber Alfaro RN  Outcome: Progressing     Problem: Safety - Adult  Goal: Free from fall injury  5/4/2024 1757 by Gerber Alfaro RN  Outcome: Progressing     Problem: ABCDS Injury Assessment  Goal: Absence of physical injury  5/4/2024 1757 by Gerber Alfaro RN  Outcome: Progressing     Problem: Respiratory - Adult  Goal: Achieves optimal ventilation and oxygenation  Outcome: Progressing     Problem: Skin/Tissue Integrity - Adult  Goal: Skin integrity remains intact  5/4/2024 1757 by Gerber Alfaro RN  Outcome: Progressing     Problem: Skin/Tissue Integrity - Adult  Goal: Incisions, wounds, or drain sites healing without S/S of infection  Outcome: Progressing     Problem: Musculoskeletal - Adult  Goal: Return mobility to safest level of function  Outcome: Progressing     Problem: Metabolic/Fluid and Electrolytes - Adult  Goal: Electrolytes maintained within normal limits  Outcome: Progressing     Problem: Nutrition Deficit:  Goal: Optimize nutritional status  5/4/2024 1757 by Gerber Alfaro RN  Outcome: Progressing     Problem: Chronic Conditions and Co-morbidities  Goal: Patient's chronic conditions and co-morbidity symptoms are monitored and maintained or improved  5/4/2024 1757 by Gerber Alfaro RN  Outcome: Progressing     Problem: Skin/Tissue Integrity  Goal: Absence of new skin breakdown  Description: 1.  Monitor for areas of redness and/or skin breakdown  2.  Assess vascular access sites hourly  3.  Every 4-6 hours minimum:  Change oxygen saturation probe site  4.  Every 4-6 hours:  If on nasal continuous positive airway pressure, respiratory therapy assess nares 
Patient A&O x4  1 assist and walker.  Pull up briefs, will call to use bathroom but incontinent at times.   DNR-CC A  BP runs low.  Midodrin TID  Give Bumex IV unless BP less than 90, then give PRN Albumin and re-assess. (This per Dr Clayton. )     Coumadin being held until platelets above 50,000.     Attempted to re-start IV per protocol but was unsuccessful. Patient prefer to keep IV he has and not be poked again.      Precert pending [facility].        Problem: Discharge Planning  Goal: Discharge to home or other facility with appropriate resources  5/7/2024 2312 by Kristyn Ferrara RN  Outcome: Progressing     Problem: Safety - Adult  Goal: Free from fall injury  5/7/2024 2312 by Kristyn Ferrara RN  Outcome: Progressing     Problem: ABCDS Injury Assessment  Goal: Absence of physical injury  5/7/2024 2312 by Kristyn Ferrara RN  Outcome: Progressing     Problem: Metabolic/Fluid and Electrolytes - Adult  Goal: Electrolytes maintained within normal limits  5/7/2024 2312 by Kristyn Ferrara RN  Outcome: Progressing     Problem: Skin/Tissue Integrity  Goal: Absence of new skin breakdown  Description: 1.  Monitor for areas of redness and/or skin breakdown  2.  Assess vascular access sites hourly  3.  Every 4-6 hours minimum:  Change oxygen saturation probe site  4.  Every 4-6 hours:  If on nasal continuous positive airway pressure, respiratory therapy assess nares and determine need for appliance change or resting period.  5/7/2024 2312 by Kristyn Ferrara, RN  Outcome: Progressing     
Patient's BP has been below 100 systolic and below 70 diastolic throughout the day. Patient is on Albumin Human 5% IV solution per order and will follow with Bumex IV.  Problem: Discharge Planning  Goal: Discharge to home or other facility with appropriate resources  Outcome: Progressing  Flowsheets (Taken 5/6/2024 1116)  Discharge to home or other facility with appropriate resources: Identify barriers to discharge with patient and caregiver     Problem: Musculoskeletal - Adult  Goal: Return mobility to safest level of function  Outcome: Progressing  Flowsheets (Taken 5/6/2024 1116)  Return Mobility to Safest Level of Function:   Assess patient stability and activity tolerance for standing, transferring and ambulating with or without assistive devices   Assist with transfers and ambulation using safe patient handling equipment as needed     Problem: Chronic Conditions and Co-morbidities  Goal: Patient's chronic conditions and co-morbidity symptoms are monitored and maintained or improved  Outcome: Progressing  Flowsheets (Taken 5/6/2024 1116)  Care Plan - Patient's Chronic Conditions and Co-Morbidity Symptoms are Monitored and Maintained or Improved: Monitor and assess patient's chronic conditions and comorbid symptoms for stability, deterioration, or improvement     
Pt AOx4. [place]. Up with walker stand by. Receiving IV lasix and aldactone PO for diuresis. Patient up to toilet to void and was unable to get accurate output due to voiding in toilet with bowel movements. Asked PCT if we can try to get in a urinal. Pletal held d/t platelt count. Pt in afib rate semi controlled, carvedilol restarted to try and help keep controlled. (Received AM dose. BP at dinner was soft and order parameters not met. Legs remained ACE wrapped and trying to keep elevated  Problem: Discharge Planning  Goal: Discharge to home or other facility with appropriate resources  Outcome: Progressing     Problem: Safety - Adult  Goal: Free from fall injury  Outcome: Progressing     Problem: ABCDS Injury Assessment  Goal: Absence of physical injury  Outcome: Progressing     Problem: Respiratory - Adult  Goal: Achieves optimal ventilation and oxygenation  Reactivated     Problem: Skin/Tissue Integrity - Adult  Goal: Skin integrity remains intact  Reactivated  Goal: Incisions, wounds, or drain sites healing without S/S of infection  Reactivated     Problem: Musculoskeletal - Adult  Goal: Return mobility to safest level of function  Reactivated     Problem: Metabolic/Fluid and Electrolytes - Adult  Goal: Electrolytes maintained within normal limits  Reactivated     
Quiet shift spent. Nil acute events overnight.  IV diuresis continues. Up with a walker to bathroom x 1 . Will continue to monitor and update care plan accordingly.     Problem: Discharge Planning  Goal: Discharge to home or other facility with appropriate resources  Outcome: Progressing  Flowsheets (Taken 4/30/2024 2000)  Discharge to home or other facility with appropriate resources:   Identify barriers to discharge with patient and caregiver   Arrange for needed discharge resources and transportation as appropriate   Identify discharge learning needs (meds, wound care, etc)   Refer to discharge planning if patient needs post-hospital services based on physician order or complex needs related to functional status, cognitive ability or social support system     Problem: Safety - Adult  Goal: Free from fall injury  Outcome: Progressing     Problem: ABCDS Injury Assessment  Goal: Absence of physical injury  Outcome: Progressing     
of new skin breakdown  Description: 1.  Monitor for areas of redness and/or skin breakdown  2.  Assess vascular access sites hourly  3.  Every 4-6 hours minimum:  Change oxygen saturation probe site  4.  Every 4-6 hours:  If on nasal continuous positive airway pressure, respiratory therapy assess nares and determine need for appliance change or resting period.  Outcome: Adequate for Discharge

## 2024-05-09 NOTE — DISCHARGE SUMMARY
DISCHARGE SUMMARY  [facility] [employer]    Adult Hospitalist      Patient ID: Bruno Bustamante  MRN: [id number]     Acct:  [id number]       Patient's PCP: Ko Bauer MD    Admit Date: 4/29/2024     Discharge Date: 5/8/2024      Admitting Physician: Vicky Galindo MD    Discharge Physician: Stephanie Demarco MD     CONSULTANTS: Patient Care Team:  Ko Bauer MD as PCP - General (Family Medicine)    PROCEDURES PERFORMED:     Active Discharge Diagnoses:    Bilateral lower extremity stasis dermatitis  Lactate and procalcitonin  Blood culture shows no growth to date  IV cefepime discontinued by ID/off antibiotics  Infectious disease was involved in the care     Stasis dermatitis  Patient has chronic stasis dermatitis  Venous Doppler lower extremity negative for DVT     Acute on chronic systolic diastolic CHF  Monitoring BNP  Patient presented with bilateral lower extremity edema-worsening  IV Bumex 2 mg twice a day  Monitor creatinine  Echocardiogram shows EF 25 to 30%  Patient on doxazosin and Zaroxolyn  Patient not a candidate for Ace/ Arb/AICD  Cardiology following   Nephrology following  Palliative care consult     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  IV iron  Decision of anticoagulation as per cardiology and recommendation of continuation of Coumadin as per cardiology      Chronic anemia  At baseline     Hypertensive heart disease  Continue carvedilol      Mixed hyperlipidemia  Continue pravastatin     Diabetes type 2  Monitor blood glucose  SSI     Peripheral arterial disease  Stable      Primary Problem  Cellulitis    Hospital Course: Bruno Bustamante  MD for the opportunity to be involved in this patient's care.    This note was created with the assistance of a speech-recognition program.  Although the intention is to generate a document that actually reflects the content of the visit, no guarantees can be provided that every mistake has been identified and corrected by editing.     Note was updated later by me after  physical examination and  completion of the assessment.

## 2024-05-16 NOTE — PROGRESS NOTES
Physician Progress Note      PATIENT:               CECI GRIMALDO  [facility] #:                  [id number]  :                       1930  ADMIT DATE:       2024 9:48 AM  DISCH DATE:        2024 3:48 PM  RESPONDING  PROVIDER #:        Cassandra Palacios CNP          QUERY TEXT:    Patient admitted with B/l LE cellulitis.  Noted documentation of Acute Kidney   Injury in Cardiology consult , PN , 5/3.  In order to support the   diagnosis of DELROY, please include additional clinical indicators in your   documentation.? Or please document if the diagnosis of DELROY has been ruled out   after further study.    The medical record reflects the following:  Risk Factors: CKD 3B, HTN  Clinical Indicators: Cardiology consult , PN , 5/3- DELROY on CKD,   Nephrology Consult -CKD stage III with a baseline creatinine of 1.9 mg/dL  CR-1.9, 2, 2.1  BUN-54, 57, 51  GFR-29, 31  Treatment: IVF, Serial lab    Defined by Kidney Disease Improving Global Outcomes (KDIGO) clinical practice   guideline for acute kidney injury:  -Increase in SCr by greater than or equal to 0.3 mg/dl within 48 hours; or  -Increase or decrease in SCr to greater than or equal to 1.5 times baseline,   which is known or presumed to have occurred within the prior 7 days; or  -Urine volume < 0.5ml/kg/h for 6 hours.      Thank you,  Oksana Burton S CDS  Options provided:  -- Acute kidney injury evidenced by, Please document evidence as well as a   numerical baseline creatinine, if known.  -- CKD 3B only  -- Other - I will add my own diagnosis  -- Disagree - Not applicable / Not valid  -- Disagree - Clinically unable to determine / Unknown  -- Refer to Clinical Documentation Reviewer    PROVIDER RESPONSE TEXT:    This patient has acute kidney injury as evidenced by    Query created by: Oksana Burton on 2024 5:53 AM      Electronically signed by:  Cassandra Palacios CNP 2024 3:29 PM